# Patient Record
Sex: FEMALE | Race: WHITE | NOT HISPANIC OR LATINO | Employment: UNEMPLOYED | RURAL
[De-identification: names, ages, dates, MRNs, and addresses within clinical notes are randomized per-mention and may not be internally consistent; named-entity substitution may affect disease eponyms.]

---

## 2022-09-20 ENCOUNTER — OFFICE VISIT (OUTPATIENT)
Dept: FAMILY MEDICINE | Facility: CLINIC | Age: 62
End: 2022-09-20
Payer: MEDICARE

## 2022-09-20 VITALS
DIASTOLIC BLOOD PRESSURE: 88 MMHG | WEIGHT: 137.19 LBS | BODY MASS INDEX: 22.05 KG/M2 | HEIGHT: 66 IN | TEMPERATURE: 98 F | OXYGEN SATURATION: 95 % | SYSTOLIC BLOOD PRESSURE: 126 MMHG | RESPIRATION RATE: 22 BRPM | HEART RATE: 117 BPM

## 2022-09-20 DIAGNOSIS — Z11.59 SCREENING FOR VIRAL DISEASE: ICD-10-CM

## 2022-09-20 DIAGNOSIS — Z12.31 SCREENING MAMMOGRAM FOR BREAST CANCER: ICD-10-CM

## 2022-09-20 DIAGNOSIS — F32.A DEPRESSION, UNSPECIFIED DEPRESSION TYPE: ICD-10-CM

## 2022-09-20 DIAGNOSIS — K63.0 ABSCESS OF SIGMOID COLON: ICD-10-CM

## 2022-09-20 DIAGNOSIS — M81.0 OSTEOPOROSIS, UNSPECIFIED OSTEOPOROSIS TYPE, UNSPECIFIED PATHOLOGICAL FRACTURE PRESENCE: ICD-10-CM

## 2022-09-20 DIAGNOSIS — Z12.11 SCREEN FOR COLON CANCER: ICD-10-CM

## 2022-09-20 DIAGNOSIS — Z13.6 ENCOUNTER FOR SCREENING FOR CARDIOVASCULAR DISORDERS: ICD-10-CM

## 2022-09-20 DIAGNOSIS — R73.9 HYPERGLYCEMIA: ICD-10-CM

## 2022-09-20 DIAGNOSIS — Z79.899 OTHER LONG TERM (CURRENT) DRUG THERAPY: ICD-10-CM

## 2022-09-20 DIAGNOSIS — R03.0 ELEVATED BLOOD PRESSURE READING: Primary | ICD-10-CM

## 2022-09-20 DIAGNOSIS — Z98.890 S/P CLOSURE OF ILEOSTOMY: ICD-10-CM

## 2022-09-20 DIAGNOSIS — L98.9 SKIN LESION: ICD-10-CM

## 2022-09-20 LAB
25(OH)D3 SERPL-MCNC: 9.1 NG/ML
ALBUMIN SERPL BCP-MCNC: 3.9 G/DL (ref 3.5–5)
ALBUMIN/GLOB SERPL: 1.3 {RATIO}
ALP SERPL-CCNC: 104 U/L (ref 50–130)
ALT SERPL W P-5'-P-CCNC: 44 U/L (ref 13–56)
ANION GAP SERPL CALCULATED.3IONS-SCNC: 15 MMOL/L (ref 7–16)
AST SERPL W P-5'-P-CCNC: 56 U/L (ref 15–37)
BASOPHILS # BLD AUTO: 0.07 K/UL (ref 0–0.2)
BASOPHILS NFR BLD AUTO: 0.8 % (ref 0–1)
BILIRUB SERPL-MCNC: 1 MG/DL (ref ?–1.2)
BUN SERPL-MCNC: 4 MG/DL (ref 7–18)
BUN/CREAT SERPL: 8 (ref 6–20)
CALCIUM SERPL-MCNC: 8.9 MG/DL (ref 8.5–10.1)
CHLORIDE SERPL-SCNC: 96 MMOL/L (ref 98–107)
CHOLEST SERPL-MCNC: 130 MG/DL (ref 0–200)
CHOLEST/HDLC SERPL: 1.7 {RATIO}
CO2 SERPL-SCNC: 28 MMOL/L (ref 21–32)
CREAT SERPL-MCNC: 0.48 MG/DL (ref 0.55–1.02)
CREAT UR-MCNC: 185 MG/DL (ref 28–219)
DIFFERENTIAL METHOD BLD: ABNORMAL
EGFR (NO RACE VARIABLE) (RUSH/TITUS): 107 ML/MIN/1.73M²
EOSINOPHIL # BLD AUTO: 0.1 K/UL (ref 0–0.5)
EOSINOPHIL NFR BLD AUTO: 1.1 % (ref 1–4)
ERYTHROCYTE [DISTWIDTH] IN BLOOD BY AUTOMATED COUNT: 13.3 % (ref 11.5–14.5)
EST. AVERAGE GLUCOSE BLD GHB EST-MCNC: 74 MG/DL
FOLATE SERPL-MCNC: 2.3 NG/ML (ref 3.1–17.5)
GLOBULIN SER-MCNC: 3 G/DL (ref 2–4)
GLUCOSE SERPL-MCNC: 80 MG/DL (ref 74–106)
HBA1C MFR BLD HPLC: 4.8 % (ref 4.5–6.6)
HCT VFR BLD AUTO: 44.9 % (ref 38–47)
HCV AB SER QL: NORMAL
HDLC SERPL-MCNC: 77 MG/DL (ref 40–60)
HGB BLD-MCNC: 15.8 G/DL (ref 12–16)
HIV 1+O+2 AB SERPL QL: NORMAL
IMM GRANULOCYTES # BLD AUTO: 0.05 K/UL (ref 0–0.04)
IMM GRANULOCYTES NFR BLD: 0.5 % (ref 0–0.4)
LDLC SERPL CALC-MCNC: 42 MG/DL
LDLC/HDLC SERPL: 0.5 {RATIO}
LYMPHOCYTES # BLD AUTO: 1.78 K/UL (ref 1–4.8)
LYMPHOCYTES NFR BLD AUTO: 19.5 % (ref 27–41)
MCH RBC QN AUTO: 36.8 PG (ref 27–31)
MCHC RBC AUTO-ENTMCNC: 35.2 G/DL (ref 32–36)
MCV RBC AUTO: 104.7 FL (ref 80–96)
MICROALBUMIN UR-MCNC: 4.2 MG/DL (ref 0–2.8)
MICROALBUMIN/CREAT RATIO PNL UR: 22.7 MG/G (ref 0–30)
MONOCYTES # BLD AUTO: 0.84 K/UL (ref 0–0.8)
MONOCYTES NFR BLD AUTO: 9.2 % (ref 2–6)
MPC BLD CALC-MCNC: 10 FL (ref 9.4–12.4)
NEUTROPHILS # BLD AUTO: 6.29 K/UL (ref 1.8–7.7)
NEUTROPHILS NFR BLD AUTO: 68.9 % (ref 53–65)
NONHDLC SERPL-MCNC: 53 MG/DL
NRBC # BLD AUTO: 0 X10E3/UL
NRBC, AUTO (.00): 0 %
PLATELET # BLD AUTO: 244 K/UL (ref 150–400)
POTASSIUM SERPL-SCNC: 3.5 MMOL/L (ref 3.5–5.1)
PROT SERPL-MCNC: 6.9 G/DL (ref 6.4–8.2)
RBC # BLD AUTO: 4.29 M/UL (ref 4.2–5.4)
SODIUM SERPL-SCNC: 135 MMOL/L (ref 136–145)
T4 FREE SERPL-MCNC: 0.94 NG/DL (ref 0.76–1.46)
TRIGL SERPL-MCNC: 57 MG/DL (ref 35–150)
TSH SERPL DL<=0.005 MIU/L-ACNC: 0.48 UIU/ML (ref 0.36–3.74)
VIT B12 SERPL-MCNC: >6000 PG/ML (ref 193–986)
VLDLC SERPL-MCNC: 11 MG/DL
WBC # BLD AUTO: 9.13 K/UL (ref 4.5–11)

## 2022-09-20 PROCEDURE — 36415 COLL VENOUS BLD VENIPUNCTURE: CPT | Mod: ,,, | Performed by: NURSE PRACTITIONER

## 2022-09-20 PROCEDURE — 90686 IIV4 VACC NO PRSV 0.5 ML IM: CPT | Mod: ,,, | Performed by: NURSE PRACTITIONER

## 2022-09-20 PROCEDURE — 82607 VITAMIN B-12: CPT | Mod: ,,, | Performed by: CLINICAL MEDICAL LABORATORY

## 2022-09-20 PROCEDURE — 99214 OFFICE O/P EST MOD 30 MIN: CPT | Mod: ,,, | Performed by: NURSE PRACTITIONER

## 2022-09-20 PROCEDURE — 84439 THYROID PANEL: ICD-10-PCS | Mod: ,,, | Performed by: CLINICAL MEDICAL LABORATORY

## 2022-09-20 PROCEDURE — 90670 PCV13 VACCINE IM: CPT | Mod: ,,, | Performed by: NURSE PRACTITIONER

## 2022-09-20 PROCEDURE — 85025 CBC WITH DIFFERENTIAL: ICD-10-PCS | Mod: ,,, | Performed by: CLINICAL MEDICAL LABORATORY

## 2022-09-20 PROCEDURE — 99214 PR OFFICE/OUTPT VISIT, EST, LEVL IV, 30-39 MIN: ICD-10-PCS | Mod: ,,, | Performed by: NURSE PRACTITIONER

## 2022-09-20 PROCEDURE — 80061 LIPID PANEL: ICD-10-PCS | Mod: ,,, | Performed by: CLINICAL MEDICAL LABORATORY

## 2022-09-20 PROCEDURE — 84443 THYROID PANEL: ICD-10-PCS | Mod: ,,, | Performed by: CLINICAL MEDICAL LABORATORY

## 2022-09-20 PROCEDURE — 82607 VITAMIN B12/FOLATE, SERUM PANEL: ICD-10-PCS | Mod: ,,, | Performed by: CLINICAL MEDICAL LABORATORY

## 2022-09-20 PROCEDURE — 82043 MICROALBUMIN / CREATININE RATIO URINE: ICD-10-PCS | Mod: ,,, | Performed by: CLINICAL MEDICAL LABORATORY

## 2022-09-20 PROCEDURE — G0008 ADMIN INFLUENZA VIRUS VAC: HCPCS | Mod: ,,, | Performed by: NURSE PRACTITIONER

## 2022-09-20 PROCEDURE — 82043 UR ALBUMIN QUANTITATIVE: CPT | Mod: ,,, | Performed by: CLINICAL MEDICAL LABORATORY

## 2022-09-20 PROCEDURE — 90686 FLU VACCINE (QUAD) GREATER THAN OR EQUAL TO 3YO PRESERVATIVE FREE IM: ICD-10-PCS | Mod: ,,, | Performed by: NURSE PRACTITIONER

## 2022-09-20 PROCEDURE — 80053 COMPREHENSIVE METABOLIC PANEL: ICD-10-PCS | Mod: ,,, | Performed by: CLINICAL MEDICAL LABORATORY

## 2022-09-20 PROCEDURE — 82746 VITAMIN B12/FOLATE, SERUM PANEL: ICD-10-PCS | Mod: ,,, | Performed by: CLINICAL MEDICAL LABORATORY

## 2022-09-20 PROCEDURE — 80061 LIPID PANEL: CPT | Mod: ,,, | Performed by: CLINICAL MEDICAL LABORATORY

## 2022-09-20 PROCEDURE — 96372 PR INJECTION,THERAP/PROPH/DIAG2ST, IM OR SUBCUT: ICD-10-PCS | Mod: ,,, | Performed by: NURSE PRACTITIONER

## 2022-09-20 PROCEDURE — 84443 ASSAY THYROID STIM HORMONE: CPT | Mod: ,,, | Performed by: CLINICAL MEDICAL LABORATORY

## 2022-09-20 PROCEDURE — 85025 COMPLETE CBC W/AUTO DIFF WBC: CPT | Mod: ,,, | Performed by: CLINICAL MEDICAL LABORATORY

## 2022-09-20 PROCEDURE — 82306 VITAMIN D: ICD-10-PCS | Mod: ,,, | Performed by: CLINICAL MEDICAL LABORATORY

## 2022-09-20 PROCEDURE — 86803 HEPATITIS C ANTIBODY: ICD-10-PCS | Mod: ,,, | Performed by: CLINICAL MEDICAL LABORATORY

## 2022-09-20 PROCEDURE — 90670 PNEUMOCOCCAL CONJUGATE VACCINE 13-VALENT LESS THAN 5YO & GREATER THAN: ICD-10-PCS | Mod: ,,, | Performed by: NURSE PRACTITIONER

## 2022-09-20 PROCEDURE — 84439 ASSAY OF FREE THYROXINE: CPT | Mod: ,,, | Performed by: CLINICAL MEDICAL LABORATORY

## 2022-09-20 PROCEDURE — 82570 MICROALBUMIN / CREATININE RATIO URINE: ICD-10-PCS | Mod: ,,, | Performed by: CLINICAL MEDICAL LABORATORY

## 2022-09-20 PROCEDURE — 86803 HEPATITIS C AB TEST: CPT | Mod: ,,, | Performed by: CLINICAL MEDICAL LABORATORY

## 2022-09-20 PROCEDURE — 83036 HEMOGLOBIN A1C: ICD-10-PCS | Mod: ,,, | Performed by: CLINICAL MEDICAL LABORATORY

## 2022-09-20 PROCEDURE — G0009 ADMIN PNEUMOCOCCAL VACCINE: HCPCS | Mod: ,,, | Performed by: NURSE PRACTITIONER

## 2022-09-20 PROCEDURE — 82306 VITAMIN D 25 HYDROXY: CPT | Mod: ,,, | Performed by: CLINICAL MEDICAL LABORATORY

## 2022-09-20 PROCEDURE — 83036 HEMOGLOBIN GLYCOSYLATED A1C: CPT | Mod: ,,, | Performed by: CLINICAL MEDICAL LABORATORY

## 2022-09-20 PROCEDURE — G0008 FLU VACCINE (QUAD) GREATER THAN OR EQUAL TO 3YO PRESERVATIVE FREE IM: ICD-10-PCS | Mod: ,,, | Performed by: NURSE PRACTITIONER

## 2022-09-20 PROCEDURE — 36415 PR COLLECTION VENOUS BLOOD,VENIPUNCTURE: ICD-10-PCS | Mod: ,,, | Performed by: NURSE PRACTITIONER

## 2022-09-20 PROCEDURE — 96372 THER/PROPH/DIAG INJ SC/IM: CPT | Mod: ,,, | Performed by: NURSE PRACTITIONER

## 2022-09-20 PROCEDURE — 80053 COMPREHEN METABOLIC PANEL: CPT | Mod: ,,, | Performed by: CLINICAL MEDICAL LABORATORY

## 2022-09-20 PROCEDURE — G0009 PNEUMOCOCCAL CONJUGATE VACCINE 13-VALENT LESS THAN 5YO & GREATER THAN: ICD-10-PCS | Mod: ,,, | Performed by: NURSE PRACTITIONER

## 2022-09-20 PROCEDURE — 82746 ASSAY OF FOLIC ACID SERUM: CPT | Mod: ,,, | Performed by: CLINICAL MEDICAL LABORATORY

## 2022-09-20 PROCEDURE — 82570 ASSAY OF URINE CREATININE: CPT | Mod: ,,, | Performed by: CLINICAL MEDICAL LABORATORY

## 2022-09-20 PROCEDURE — 87389 HIV 1 / 2 ANTIBODY: ICD-10-PCS | Mod: ,,, | Performed by: CLINICAL MEDICAL LABORATORY

## 2022-09-20 PROCEDURE — 87389 HIV-1 AG W/HIV-1&-2 AB AG IA: CPT | Mod: ,,, | Performed by: CLINICAL MEDICAL LABORATORY

## 2022-09-20 RX ORDER — ZOSTER VACCINE RECOMBINANT, ADJUVANTED 50 MCG/0.5
0.5 KIT INTRAMUSCULAR ONCE
Qty: 1 EACH | Refills: 0 | Status: SHIPPED | OUTPATIENT
Start: 2022-09-20 | End: 2022-09-20

## 2022-09-20 RX ORDER — ESCITALOPRAM OXALATE 20 MG/1
20 TABLET ORAL DAILY
COMMUNITY
End: 2023-02-03 | Stop reason: SDUPTHER

## 2022-09-20 RX ORDER — FAMOTIDINE 20 MG/1
20 TABLET, FILM COATED ORAL 2 TIMES DAILY
COMMUNITY
End: 2023-02-22

## 2022-09-20 RX ORDER — CYANOCOBALAMIN 1000 UG/ML
1000 INJECTION, SOLUTION INTRAMUSCULAR; SUBCUTANEOUS
Status: COMPLETED | OUTPATIENT
Start: 2022-09-20 | End: 2022-09-20

## 2022-09-20 RX ADMIN — CYANOCOBALAMIN 1000 MCG: 1000 INJECTION, SOLUTION INTRAMUSCULAR; SUBCUTANEOUS at 03:09

## 2022-09-20 NOTE — PROGRESS NOTES
Vanessa Rasmussen DNP   [address] [place] [address]     PATIENT NAME: Tammy Behles  : 1960  DATE: 22  MRN: [id number]      Billing Provider: Vanessa Rasmussen DNP  Level of Service:   Patient PCP Information       Provider PCP Type    Vanessa Rasmussen DNP General            Reason for Visit / Chief Complaint: chest lesion  and Shortness of Breath (Exertion ) needing to establish care.      Update PCP  Update Chief Complaint         History of Present Illness / Problem Focused Workflow     Tammy Behles presents to the clinic with chest lesion  and Shortness of Breath (Exertion )     HPI    Review of Systems     Review of Systems     Medical / Social / Family History     Past Medical History:   Diagnosis Date    Depression     Neuropathy     Osteoporosis        Past Surgical History:   Procedure Laterality Date     SECTION      ILEOSTOMY      LIVER SURGERY         Social History  Ms.  reports that she has been smoking cigarettes. She has a 45.00 pack-year smoking history. She has never used smokeless tobacco. She reports current alcohol use of about 56.0 standard drinks per week. She reports current drug use. Drug: Marijuana.    Family History  Ms.'s family history includes Cancer in her father, mother, and sister; Diverticulitis in her mother; Hypertension in her father; Ulcerative colitis in her brother.    Medications and Allergies     Medications  No outpatient medications have been marked as taking for the 22 encounter (Office Visit) with Vanessa Rasmussen DNP.     Current Facility-Administered Medications for the 22 encounter (Office Visit) with Vanessa Rasmussen DNP   Medication Dose Route Frequency Provider Last Rate Last Admin    [COMPLETED] cyanocobalamin injection 1,000 mcg  1,000 mcg Intramuscular 1 time in Clinic/HOD Vanessa Rasmussen DNP   1,000 mcg at 22 1513       Allergies  Review of patient's allergies indicates:   Allergen Reactions  "   Keflex [cephalexin] Itching and Rash       Physical Examination   /88 (BP Location: Left arm, Patient Position: Sitting, BP Method: Large (Automatic))   Pulse (!) 117   Temp 98.4 °F (36.9 °C) (Oral)   Resp (!) 22   Ht 5' 6" (1.676 m)   Wt 62.2 kg (137 lb 3.2 oz)   SpO2 95%   BMI 22.14 kg/m²    Physical Exam  Vitals and nursing note reviewed.   Constitutional:       Appearance: Normal appearance. She is normal weight.   HENT:      Head: Normocephalic.      Nose: Nose normal.      Mouth/Throat:      Mouth: Mucous membranes are moist.   Eyes:      Pupils: Pupils are equal, round, and reactive to light.   Cardiovascular:      Rate and Rhythm: Normal rate and regular rhythm.      Pulses: Normal pulses.      Heart sounds: Normal heart sounds.   Pulmonary:      Effort: Pulmonary effort is normal.      Breath sounds: Normal breath sounds.   Abdominal:      General: Abdomen is flat. Bowel sounds are normal.      Palpations: Abdomen is soft.      Comments: Scarring rt mid abd from ileostomy reversal.   Musculoskeletal:      Cervical back: Normal range of motion and neck supple.      Comments: Cane and partner to assist with walking and getting around.   Skin:     General: Skin is warm and dry.      Capillary Refill: Capillary refill takes less than 2 seconds.   Neurological:      General: No focal deficit present.      Mental Status: She is alert and oriented to person, place, and time. Mental status is at baseline.   Psychiatric:         Mood and Affect: Mood normal.         Behavior: Behavior normal.         Thought Content: Thought content normal.         Judgment: Judgment normal.        Assessment and Plan (including Health Maintenance)      Problem List  Smart Sets  Document Outside HM   :    Plan:     Here to establish care   Has not seen provider for a few years...    Labs today  Pcv and flu vacc today  Req b12    Mmg - ordered to do walkin - poor transportation issues.   Cologuard - refuses " colonoscopy  Had a colon abscess went to [place] and was transferred to [facility] and had emergency surgery with ileostomy and then had reversal done nearly a year later.. 2020? Get records    Sent in tdap and shingrix wait 4 weeks to get.   Needs pap scheduled.   Smokes 1 ppd x15 years.     Unable to walk without cane and assistance from her partner.      Health Maintenance Due   Topic Date Due    Cervical Cancer Screening  Never done    TETANUS VACCINE  Never done    Mammogram  Never done    Colorectal Cancer Screening  Never done    Shingles Vaccine (1 of 2) Never done       Problem List Items Addressed This Visit          Psychiatric    Depression       Derm    Skin lesion    Relevant Orders    Ambulatory referral/consult to Dermatology       Cardiac/Vascular    Encounter for screening for cardiovascular disorders    Relevant Orders    Lipid Panel (Completed)    Elevated blood pressure reading - Primary    Relevant Orders    CBC Auto Differential (Completed)    Comprehensive Metabolic Panel (Completed)    Microalbumin/Creatinine Ratio, Urine (Completed)       ID    Screening for viral disease    Relevant Orders    Hepatitis C Antibody (Completed)    HIV 1/2 Ag/Ab (4th Gen) (Completed)       Endocrine    Hyperglycemia    Relevant Orders    Hemoglobin A1C (Completed)       GI    Abscess of sigmoid colon    S/P closure of ileostomy       Orthopedic    Osteoporosis       Palliative Care    Other long term (current) drug therapy    Relevant Orders    Vitamin D (Completed)    Vitamin B12 & Folate (Completed)    Thyroid Panel (Completed)       Health Maintenance Topics with due status: Not Due       Topic Last Completion Date    Pneumococcal Vaccines (Age 0-64) 09/20/2022    Lipid Panel 09/20/2022       Future Appointments   Date Time Provider Department Center   9/21/2022  3:30 PM Morena Paige MD [facility] DER[facility] [place]   9/29/2022 10:00 AM Vanessa Rasmussen DNP [facility] [place]            Signature:  Vanessa   MARYURI Rasmussen, DNP      [address] [place] [address]    Date of encounter: 9/20/22

## 2022-09-21 ENCOUNTER — OFFICE VISIT (OUTPATIENT)
Dept: DERMATOLOGY | Facility: CLINIC | Age: 62
End: 2022-09-21
Payer: MEDICARE

## 2022-09-21 VITALS — WEIGHT: 137 LBS | HEIGHT: 66 IN | BODY MASS INDEX: 22.02 KG/M2 | RESPIRATION RATE: 18 BRPM

## 2022-09-21 DIAGNOSIS — D48.5 NEOPLASM OF UNCERTAIN BEHAVIOR OF SKIN: ICD-10-CM

## 2022-09-21 DIAGNOSIS — L57.8 OTHER SKIN CHANGES DUE TO CHRONIC EXPOSURE TO NONIONIZING RADIATION: ICD-10-CM

## 2022-09-21 DIAGNOSIS — Z80.8 FAMILY HISTORY OF MELANOMA: ICD-10-CM

## 2022-09-21 DIAGNOSIS — L82.1 SEBORRHEIC KERATOSES: Primary | ICD-10-CM

## 2022-09-21 PROBLEM — Z79.899 OTHER LONG TERM (CURRENT) DRUG THERAPY: Status: ACTIVE | Noted: 2022-09-21

## 2022-09-21 PROBLEM — Z11.59 SCREENING FOR VIRAL DISEASE: Status: ACTIVE | Noted: 2022-09-21

## 2022-09-21 PROBLEM — Z13.6 ENCOUNTER FOR SCREENING FOR CARDIOVASCULAR DISORDERS: Status: ACTIVE | Noted: 2022-09-21

## 2022-09-21 PROBLEM — Z12.11 SCREEN FOR COLON CANCER: Status: ACTIVE | Noted: 2022-09-21

## 2022-09-21 PROBLEM — Z12.31 SCREENING MAMMOGRAM FOR BREAST CANCER: Status: ACTIVE | Noted: 2022-09-21

## 2022-09-21 PROBLEM — L98.9 SKIN LESION: Status: ACTIVE | Noted: 2022-09-21

## 2022-09-21 PROBLEM — R73.9 HYPERGLYCEMIA: Status: ACTIVE | Noted: 2022-09-21

## 2022-09-21 PROBLEM — R03.0 ELEVATED BLOOD PRESSURE READING: Status: ACTIVE | Noted: 2022-09-21

## 2022-09-21 PROCEDURE — 99203 OFFICE O/P NEW LOW 30 MIN: CPT | Mod: 25,,, | Performed by: DERMATOLOGY

## 2022-09-21 PROCEDURE — 11603 PR EXC SKIN MALIG 2.1-3 CM TRUNK,ARM,LEG: ICD-10-PCS | Mod: 51,,, | Performed by: DERMATOLOGY

## 2022-09-21 PROCEDURE — 99203 PR OFFICE/OUTPT VISIT, NEW, LEVL III, 30-44 MIN: ICD-10-PCS | Mod: 25,,, | Performed by: DERMATOLOGY

## 2022-09-21 PROCEDURE — 12032 INTMD RPR S/A/T/EXT 2.6-7.5: CPT | Mod: ,,, | Performed by: DERMATOLOGY

## 2022-09-21 PROCEDURE — 11603 EXC TR-EXT MAL+MARG 2.1-3 CM: CPT | Mod: 51,,, | Performed by: DERMATOLOGY

## 2022-09-21 PROCEDURE — 12032 PR LAYR CLOS WND TRUNK,ARM,LEG 2.6-7.5 CM: ICD-10-PCS | Mod: ,,, | Performed by: DERMATOLOGY

## 2022-09-21 NOTE — PATIENT INSTRUCTIONS
The ABCDEs of Melanoma  Asymmetry - when one side is unlike the other  Border - irregular  Color - different shades of colors that can be black, brown, tan, white, red, grey, or blue  Diameter - as big as or larger than the size of a pencil eraser (6mm)  Evolving - changing in size, color, or shape or stands out from the rest of your moles  Sunscreen Recommendations  I recommended a broad spectrum sunscreen with a SPF of 30 or higher that is water-resistant. SPF 30 sunscreens block approximately 97 percent of the sun's harmful rays.   Sunscreens should be applied at least 15 minutes prior to expected sun exposure and then every 90 minutes after that as long as sun exposure continues.   If swimming or exercising sunscreen should be reapplied every 45 minutes to an hour after getting wet or sweating.  One ounce, or the equivalent of a shot glass full of sunscreen, is adequate to protect the skin not covered by a bathing suit.   I also recommended a lip balm with a sunscreen as well.   Healthy Sun Protective Behaviors  Sun protective clothing can be used in lieu of sunscreen but must be worn the entire time you are exposed to the sun's rays.  Seek shade between 10 a.m. and 2 p.m.  Use extra caution near water, snow, or sand as they reflect sun rays  Avoid tanning beds and consider sunless self-tanning products instead  Perform monthly self skin exams  Biopsy Site Care  Starting tomorrow you may shower and wash the area with dial antibacterial soap  Pat dry and apply vaseline and a bandaid  Perform this routine for three days  The area will be irritated, sore, slightly red, and may itch, sting, or burn  Do not apply make-up to the area until it is healed  There will be a scar  The area will take 1-2 weeks to heal  No soaking in the tub or hot tub for one week    Remove sutures in 10 days

## 2022-09-21 NOTE — PROGRESS NOTES
[facility]   Morena Paige MD    Patient Name: Tammy Behles  Patient YOB: 1960   Date of Service: 22    CC: Above the Waist Skin Exam    HPI: Tammy Behles is a 62 y.o. female presents today for an above the waist skin exam.  Patient has not been seen by a dermatologist in the past and dermatologic history includes family hx of melanoma (mom). Patient is concerned today about a lesion located on the left chest.  It has been present for 1 year(s). It has not been treated in the past.      Past Medical History:   Diagnosis Date    Depression     Neuropathy     Osteoporosis      Past Surgical History:   Procedure Laterality Date     SECTION      ILEOSTOMY      LIVER SURGERY       Review of patient's allergies indicates:   Allergen Reactions    Keflex [cephalexin] Itching and Rash       Current Outpatient Medications:     ergocalciferol (ERGOCALCIFEROL) 50,000 unit Cap, Take 50,000 Units by mouth every 7 days., Disp: , Rfl:     EScitalopram oxalate (LEXAPRO) 20 MG tablet, Take 20 mg by mouth once daily., Disp: , Rfl:     famotidine (PEPCID) 20 MG tablet, Take 20 mg by mouth 2 (two) times daily., Disp: , Rfl:     folic acid (FOLVITE) 1 MG tablet, Take 1 mg by mouth 2 (two) times daily. Bid x 1 mos then daily, Disp: , Rfl:     ROS: A focused review of systems was obtained and negative.     Exam: A sun exposed skin exam was performed including scalp, hair, face, neck, chest, back, abdomen, right arm, left arm, right hand, left hand, and nails.  All areas examined were normal expect as per below in assessment and plan.  General Appearance of the patient is well developed and well nourished.  Orientation: alert and oriented x 3.  Mood and affect: pleasant.    Assessment:   The primary encounter diagnosis was Seborrheic keratoses. Diagnoses of Other skin changes due to chronic exposure to nonionizing radiation, Neoplasm of uncertain behavior of skin, and Family history of melanoma were also  pertinent to this visit.    Plan:      Seborrheic Keratosis (L82.1)  - Stuck-on, warty, greasy brown papule with pseudo-horn cysts scattered on the trunk and extremities    Plan: Counseling.  I counseled the patient regarding the following:  Skin Care: Seborrheic Keratoses are benign. No treatment is necessary.  Expectations: Seborrheic Keratoses are benign warty growths. Patients get more of them as they age    Plan: Reassurance      Neoplasm of Uncertain Behavior (D48.5)  - fungating mass located on the left chest  Ddx includes: SCC vs Melanoma    Plan: Counseling.  I counseled the patient regarding the following:  Instructions: Neoplasms of Uncertain Behavior can be observed, biopsied or surgically removed depending on the  level of clinical suspicion.  Instructions: Neoplasms of Uncertain Behavior can be observed, biopsied or surgically removed depending on the  level of clinical suspicion.  Contact Office if: patient develops any new lesions that fail to heal, ulcerate or bleed.    Plan:  Excisional Biopsy    Biopsy Photograph Reviewed: Yes    Procedure Note    Location (A): left chest  Preop Size: 2.0 x 3.0  Margin: 0.4 cm  Total Excised Diameter: 2.8 x 2.8  Procedure: Excision - Elliptical  Anesthesia: local infiltration-1% lidocaine with epinephrine (12 cc)  Estimated Blood Loss: minimal  Complications: none    Repair Type: Intermediate  Repair Length: 5 cm    Consent was obtained from the patient. The risks and benefits to therapy were discussed in detail. Specifically, the risks of infection, scarring, bleeding, prolonged wound healing, incomplete removal, allergy to anesthesia, nerve injury and recurrence were addressed. Prior to the procedure, the treatment site was clearly identified and confirmed by the patient. All components of Universal Protocol/PAUSE Rule completed.    Procedure: The patient was placed in a comfortable position exposing the surgical site. The area was prepped with Hibiclens and  draped in the usual fashion. An elliptical excision was performed, on the above listed location The margin was drawn around the clinically apparent lesion. An elliptical shape was then drawn on the skin incorporating the lesion and margins. Incisions were then made along these lines to the appropriate tissue plane and the lesion was extirpated. A 15 blade was used. The lesion was excised to the layer of the adipose tissue, removed and sent to pathology for processing and histologic evaluation.    Intermediate layered repair was performed because closure of the subcutaneous tissue and superficial non-muscular fascia was required, because damaged skin surrounding defect made closure difficult, because extensive undermining required, and because Burow's triangles were required. Undermining was performed with blunt dissection. Hemostasis was achieved with electrocautery. The subcutaneous tissue and dermis were closed with 3-0 Vicryl (interrupted). Epidermal closure was achieved with 4-0 Ethilon(interrupted). Petrolatum + dry sterile dressing were applied. I reviewed with the patient in detail post-care instructions. Patient is not to engage in any heavy lifting, exercise, or swimming for the next 14 days. Should the patient develop any fevers, chills, bleeding, severe pain patient will contact the office immediately.   - Pt will have Vanessa Rasmussen remove her sutures in 10 days       Family history of malignant melanoma  - no suspicious lesions noted    Plan: Counseling  I counseled the patient regarding the following:  Skin Care: Patients with a family history of melanoma should wear broad spectrum sunscreen and sun protective clothing.  Expectations: Patients with a family history of melanoma from a 1st degree relative have a higher risk of developing a melanoma compared with the rest of the population. Monthly self-skin checks should be performed to monitor for any moles that change in size, shape or color, itch burn  or bleed.  Contact Office if: Patient notices any new or changing moles.  Other Skin Changes Due to Chronic Exposure of Nonionizing Radiation (L57.8)    Plan: Monitoring.     Plan: Sunscreen Recommendations.  I recommended a broad spectrum sunscreen with a SPF of 30 or higher. I explained that SPF 30 sunscreens block approximately 97 percent of the  sun's harmful rays. Sunscreens should be applied at least 15 minutes prior to expected sun exposure and then every 2 hours after that as long as  sun exposure continues. If swimming or exercising sunscreen should be reapplied every 45 minutes to an hour after getting wet or sweating. One  ounce, or the equivalent of a shot glass full of sunscreen, is adequate to protect the skin not covered by a bathing suit. I also recommended a lip  balm with a sunscreen as well. Sun protective clothing can be used in lieu of sunscreen but must be worn the entire time you are exposed to the  sun's rays.]      Follow up if symptoms worsen or fail to improve.    Morena Paige MD

## 2022-09-22 RX ORDER — FOLIC ACID 1 MG/1
1 TABLET ORAL 2 TIMES DAILY
COMMUNITY
End: 2022-09-22 | Stop reason: SDUPTHER

## 2022-09-22 RX ORDER — FOLIC ACID 1 MG/1
1 TABLET ORAL DAILY
Qty: 90 TABLET | Refills: 1 | Status: SHIPPED | OUTPATIENT
Start: 2022-09-22 | End: 2023-02-22 | Stop reason: SDUPTHER

## 2022-09-22 RX ORDER — ERGOCALCIFEROL 1.25 MG/1
50000 CAPSULE ORAL
COMMUNITY
End: 2022-09-22 | Stop reason: SDUPTHER

## 2022-09-22 RX ORDER — ERGOCALCIFEROL 1.25 MG/1
50000 CAPSULE ORAL
Qty: 15 CAPSULE | Refills: 1 | Status: SHIPPED | OUTPATIENT
Start: 2022-09-22 | End: 2023-02-22 | Stop reason: SDUPTHER

## 2022-09-29 ENCOUNTER — OFFICE VISIT (OUTPATIENT)
Dept: FAMILY MEDICINE | Facility: CLINIC | Age: 62
End: 2022-09-29
Payer: MEDICARE

## 2022-09-29 VITALS
WEIGHT: 137.81 LBS | HEIGHT: 66 IN | SYSTOLIC BLOOD PRESSURE: 175 MMHG | RESPIRATION RATE: 22 BRPM | OXYGEN SATURATION: 97 % | DIASTOLIC BLOOD PRESSURE: 101 MMHG | HEART RATE: 130 BPM | TEMPERATURE: 98 F | BODY MASS INDEX: 22.15 KG/M2

## 2022-09-29 DIAGNOSIS — Z48.02 ENCOUNTER FOR REMOVAL OF SUTURES: ICD-10-CM

## 2022-09-29 DIAGNOSIS — L98.9 SKIN LESION: ICD-10-CM

## 2022-09-29 DIAGNOSIS — T81.41XA INFECTION FOLLOWING A PROCEDURE, SUPERFICIAL INCISIONAL SURGICAL SITE, INITIAL ENCOUNTER: Primary | ICD-10-CM

## 2022-09-29 PROCEDURE — 87070 CULTURE OTHR SPECIMN AEROBIC: CPT | Mod: ,,, | Performed by: CLINICAL MEDICAL LABORATORY

## 2022-09-29 PROCEDURE — 87186 CULTURE, WOUND: ICD-10-PCS | Mod: ,,, | Performed by: CLINICAL MEDICAL LABORATORY

## 2022-09-29 PROCEDURE — 87070 CULTURE, WOUND: ICD-10-PCS | Mod: ,,, | Performed by: CLINICAL MEDICAL LABORATORY

## 2022-09-29 PROCEDURE — 99213 OFFICE O/P EST LOW 20 MIN: CPT | Mod: ,,, | Performed by: NURSE PRACTITIONER

## 2022-09-29 PROCEDURE — 99213 PR OFFICE/OUTPT VISIT, EST, LEVL III, 20-29 MIN: ICD-10-PCS | Mod: ,,, | Performed by: NURSE PRACTITIONER

## 2022-09-29 PROCEDURE — 96372 THER/PROPH/DIAG INJ SC/IM: CPT | Mod: ,,, | Performed by: NURSE PRACTITIONER

## 2022-09-29 PROCEDURE — 87077 CULTURE, WOUND: ICD-10-PCS | Mod: ,,, | Performed by: CLINICAL MEDICAL LABORATORY

## 2022-09-29 PROCEDURE — 87077 CULTURE AEROBIC IDENTIFY: CPT | Mod: ,,, | Performed by: CLINICAL MEDICAL LABORATORY

## 2022-09-29 PROCEDURE — 96372 PR INJECTION,THERAP/PROPH/DIAG2ST, IM OR SUBCUT: ICD-10-PCS | Mod: ,,, | Performed by: NURSE PRACTITIONER

## 2022-09-29 PROCEDURE — 87186 SC STD MICRODIL/AGAR DIL: CPT | Mod: XU,,, | Performed by: CLINICAL MEDICAL LABORATORY

## 2022-09-29 RX ORDER — CLINDAMYCIN HYDROCHLORIDE 300 MG/1
300 CAPSULE ORAL EVERY 8 HOURS
Qty: 30 CAPSULE | Refills: 0 | Status: SHIPPED | OUTPATIENT
Start: 2022-09-29 | End: 2023-02-22 | Stop reason: ALTCHOICE

## 2022-09-29 RX ORDER — CEFTRIAXONE 1 G/1
1 INJECTION, POWDER, FOR SOLUTION INTRAMUSCULAR; INTRAVENOUS
Status: COMPLETED | OUTPATIENT
Start: 2022-09-29 | End: 2022-09-29

## 2022-09-29 RX ORDER — KETOROLAC TROMETHAMINE 30 MG/ML
60 INJECTION, SOLUTION INTRAMUSCULAR; INTRAVENOUS
Status: COMPLETED | OUTPATIENT
Start: 2022-09-29 | End: 2022-09-29

## 2022-09-29 RX ADMIN — CEFTRIAXONE 1 G: 1 INJECTION, POWDER, FOR SOLUTION INTRAMUSCULAR; INTRAVENOUS at 11:09

## 2022-09-29 RX ADMIN — KETOROLAC TROMETHAMINE 60 MG: 30 INJECTION, SOLUTION INTRAMUSCULAR; INTRAVENOUS at 11:09

## 2022-09-29 NOTE — PROGRESS NOTES
Vanessa Rasmussen DNP   [address] [place] [address]     PATIENT NAME: Tammy Behles  : 1960  DATE: 22  MRN: [id number]      Billing Provider: Vanessa Rasmussen DNP  Level of Service:   Patient PCP Information       Provider PCP Type    Vanessa Rasmussen DNP General            Reason for Visit / Chief Complaint: Suture / Staple Removal       Update PCP  Update Chief Complaint         History of Present Illness / Problem Focused Workflow     Tammy Behles presents to the clinic with Suture / Staple Removal     Suture / Staple Removal      Review of Systems     Review of Systems     Medical / Social / Family History     Past Medical History:   Diagnosis Date    Depression     Neuropathy     Osteoporosis        Past Surgical History:   Procedure Laterality Date     SECTION      ILEOSTOMY      LIVER SURGERY         Social History  Ms.  reports that she has been smoking cigarettes. She has a 45.00 pack-year smoking history. She has never used smokeless tobacco. She reports current alcohol use of about 56.0 standard drinks per week. She reports current drug use. Drug: Marijuana.    Family History  Ms.'s family history includes Cancer in her father, mother, and sister; Diverticulitis in her mother; Hypertension in her father; Ulcerative colitis in her brother.    Medications and Allergies     Medications  No outpatient medications have been marked as taking for the 22 encounter (Office Visit) with Vanessa Rasmussen DNP.     Current Facility-Administered Medications for the 22 encounter (Office Visit) with Vanessa Rasmussen DNP   Medication Dose Route Frequency Provider Last Rate Last Admin    [COMPLETED] cefTRIAXone injection 1 g  1 g Intramuscular 1 time in Clinic/HOD Vanessa Rasmussen DNP   1 g at 22 1126    [COMPLETED] ketorolac injection 60 mg  60 mg Intramuscular 1 time in Clinic/HOD Vanessa Rasmussen DNP   60 mg at 22 1126       Allergies  Review of  "patient's allergies indicates:   Allergen Reactions    Keflex [cephalexin] Itching and Rash       Physical Examination   BP (!) 175/101 (BP Location: Left arm, Patient Position: Sitting, BP Method: Large (Automatic))   Pulse (!) 130   Temp 97.7 °F (36.5 °C) (Oral)   Resp (!) 22   Ht 5' 6" (1.676 m)   Wt 62.5 kg (137 lb 12.8 oz)   SpO2 97%   BMI 22.24 kg/m²    Physical Exam  Vitals and nursing note reviewed.   Constitutional:       Appearance: Normal appearance. She is normal weight.   HENT:      Head: Normocephalic.      Nose: Nose normal.      Mouth/Throat:      Mouth: Mucous membranes are moist.   Eyes:      Extraocular Movements: Extraocular movements intact.      Conjunctiva/sclera: Conjunctivae normal.      Pupils: Pupils are equal, round, and reactive to light.   Cardiovascular:      Rate and Rhythm: Normal rate and regular rhythm.      Pulses: Normal pulses.      Heart sounds: Normal heart sounds.   Pulmonary:      Effort: Pulmonary effort is normal.      Breath sounds: Normal breath sounds.   Abdominal:      General: Abdomen is flat. Bowel sounds are normal.      Palpations: Abdomen is soft.      Comments: Scarring rt mid abd from ileostomy reversal.   Musculoskeletal:      Cervical back: Normal range of motion and neck supple.      Comments: Cane and partner to assist with walking and getting around.   Skin:     General: Skin is warm and dry.      Capillary Refill: Capillary refill takes less than 2 seconds.      Findings: Lesion present.      Comments: Had lesion removed from chest by Dr Paige 1 week ago   -  Today here to remove sutures.   4 sutures removed. Thick amount of purulent drainage noted from left side of incision seeping through the incision line.    Neurological:      General: No focal deficit present.      Mental Status: She is alert and oriented to person, place, and time. Mental status is at baseline.   Psychiatric:         Mood and Affect: Mood normal.         Behavior: Behavior " normal.         Thought Content: Thought content normal.         Judgment: Judgment normal.        Assessment and Plan (including Health Maintenance)      Problem List  Smart Sets  Document Outside HM   :    Plan:     Cleaned with soap and water.  Removed 4 sutures.   Applied 5 steri strips to site.   See picture in media prior to dressing.     Cultured drainage.   Start abx see back 1 week.    Health Maintenance Due   Topic Date Due    Cervical Cancer Screening  Never done    TETANUS VACCINE  Never done    Mammogram  Never done    Colorectal Cancer Screening  Never done    Shingles Vaccine (1 of 2) Never done       Problem List Items Addressed This Visit          Derm    Skin lesion       Other    Infection following a procedure, superficial incisional surgical site, initial encounter - Primary    Relevant Orders    Culture, Wound       Health Maintenance Topics with due status: Not Due       Topic Last Completion Date    Pneumococcal Vaccines (Age 0-64) 09/20/2022    Lipid Panel 09/20/2022       Future Appointments   Date Time Provider Department Center   10/19/2022 10:00 AM [place] NUR[facility]LAUR[facility] [place] [place]            Signature:  Vanessa Rasmussen, YEYO      [address] [place] [address]    Date of encounter: 9/29/22

## 2022-10-01 LAB
MICROORGANISM SPEC CULT: ABNORMAL
MICROORGANISM SPEC CULT: ABNORMAL

## 2022-10-09 DIAGNOSIS — Z71.89 COMPLEX CARE COORDINATION: ICD-10-CM

## 2022-10-13 ENCOUNTER — HOSPITAL ENCOUNTER (OUTPATIENT)
Dept: RADIOLOGY | Facility: HOSPITAL | Age: 62
Discharge: HOME | End: 2022-10-13
Attending: NURSE PRACTITIONER
Payer: MEDICARE

## 2022-10-13 ENCOUNTER — OFFICE VISIT (OUTPATIENT)
Dept: FAMILY MEDICINE | Facility: CLINIC | Age: 62
End: 2022-10-13
Payer: MEDICARE

## 2022-10-13 VITALS
HEART RATE: 110 BPM | DIASTOLIC BLOOD PRESSURE: 86 MMHG | WEIGHT: 140 LBS | BODY MASS INDEX: 22.5 KG/M2 | SYSTOLIC BLOOD PRESSURE: 161 MMHG | OXYGEN SATURATION: 95 % | HEIGHT: 66 IN

## 2022-10-13 DIAGNOSIS — R22.1 MASS OF RIGHT SIDE OF NECK: ICD-10-CM

## 2022-10-13 DIAGNOSIS — Z11.3 SCREEN FOR STD (SEXUALLY TRANSMITTED DISEASE): ICD-10-CM

## 2022-10-13 DIAGNOSIS — Z01.411 ENCNTR FOR GYN EXAM (GENERAL) (ROUTINE) W ABNORMAL FINDINGS: Primary | ICD-10-CM

## 2022-10-13 DIAGNOSIS — Z12.31 OTHER SCREENING MAMMOGRAM: ICD-10-CM

## 2022-10-13 DIAGNOSIS — Z12.4 SCREENING FOR MALIGNANT NEOPLASM OF CERVIX: ICD-10-CM

## 2022-10-13 LAB
BACTERIA VAG QL WET PREP: ABNORMAL /HPF
CLUE CELLS VAG QL WET PREP: ABNORMAL /HPF
RBC #/AREA VAG WET PREP: ABNORMAL /HPF
SQUAMOUS EPITHELIALS WET WOUNT, GENITAL: ABNORMAL /HPF
T VAGINALIS VAG QL WET PREP: ABNORMAL /HPF
WBC CLUMPS WET MOUNT, GENITAL: ABNORMAL /HPF
WBC VAG QL WET PREP: ABNORMAL /HPF
YEAST VAG QL WET PREP: ABNORMAL /HPF

## 2022-10-13 PROCEDURE — 87210 WET PREP, GENITAL: ICD-10-PCS | Mod: ,,, | Performed by: CLINICAL MEDICAL LABORATORY

## 2022-10-13 PROCEDURE — 76536 US EXAM OF HEAD AND NECK: CPT | Mod: TC

## 2022-10-13 PROCEDURE — 76536 US EXAM OF HEAD AND NECK: CPT | Mod: 26,,, | Performed by: RADIOLOGY

## 2022-10-13 PROCEDURE — 87591 N.GONORRHOEAE DNA AMP PROB: CPT | Mod: ,,, | Performed by: CLINICAL MEDICAL LABORATORY

## 2022-10-13 PROCEDURE — 87491 CHLMYD TRACH DNA AMP PROBE: CPT | Mod: ,,, | Performed by: CLINICAL MEDICAL LABORATORY

## 2022-10-13 PROCEDURE — 87491 CHLAMYDIA/GONORRHOEAE(GC), PCR: ICD-10-PCS | Mod: ,,, | Performed by: CLINICAL MEDICAL LABORATORY

## 2022-10-13 PROCEDURE — G0123 SCREEN CERV/VAG THIN LAYER: HCPCS | Mod: GCY | Performed by: NURSE PRACTITIONER

## 2022-10-13 PROCEDURE — 77067 SCR MAMMO BI INCL CAD: CPT | Mod: TC

## 2022-10-13 PROCEDURE — 87591 CHLAMYDIA/GONORRHOEAE(GC), PCR: ICD-10-PCS | Mod: ,,, | Performed by: CLINICAL MEDICAL LABORATORY

## 2022-10-13 PROCEDURE — 76536 US SOFT TISSUE HEAD NECK THYROID: ICD-10-PCS | Mod: 26,,, | Performed by: RADIOLOGY

## 2022-10-13 PROCEDURE — G0101 PR CA SCREEN;PELVIC/BREAST EXAM: ICD-10-PCS | Mod: ,,, | Performed by: NURSE PRACTITIONER

## 2022-10-13 PROCEDURE — G0101 CA SCREEN;PELVIC/BREAST EXAM: HCPCS | Mod: ,,, | Performed by: NURSE PRACTITIONER

## 2022-10-13 PROCEDURE — 87210 SMEAR WET MOUNT SALINE/INK: CPT | Mod: ,,, | Performed by: CLINICAL MEDICAL LABORATORY

## 2022-10-13 NOTE — PROGRESS NOTES
"SUBJECTIVE:   62 y.o. female for annual routine Pap and checkup.    Denies urinary ss   Denies family hx female reproductive cancers.     On cane         Current Outpatient Medications   Medication Sig Dispense Refill    clindamycin (CLEOCIN) 300 MG capsule Take 1 capsule (300 mg total) by mouth every 8 (eight) hours. 30 capsule 0    ergocalciferol (ERGOCALCIFEROL) 50,000 unit Cap Take 1 capsule (50,000 Units total) by mouth every 7 days. 15 capsule 1    EScitalopram oxalate (LEXAPRO) 20 MG tablet Take 20 mg by mouth once daily.      famotidine (PEPCID) 20 MG tablet Take 20 mg by mouth 2 (two) times daily.      folic acid (FOLVITE) 1 MG tablet Take 1 tablet (1 mg total) by mouth once daily. Bid x 1 mos then daily 90 tablet 1     No current facility-administered medications for this visit.     Allergies: Keflex [cephalexin]   No LMP recorded. Patient is postmenopausal.    ROS:  Feeling well. No dyspnea or chest pain on exertion.  No abdominal pain, change in bowel habits, black or bloody stools.  No urinary tract symptoms. GYN ROS: no breast pain or new or enlarging lumps on self exam. Stopped menses . No neurological complaints.    OBJECTIVE:   The patient appears well, alert, oriented x 3, in no distress.  BP (!) 161/86   Pulse 110   Ht 5' 6" (1.676 m)   Wt 63.5 kg (140 lb)   SpO2 95%   BMI 22.60 kg/m²   ENT normal.  Neck supple. No adenopathy or thyromegaly. JAIRON. Lungs are clear, good air entry, no wheezes, rhonchi or rales. S1 and S2 normal, no murmurs, regular rate and rhythm. Abdomen soft without tenderness, guarding, mass or organomegaly. Extremities show no edema, normal peripheral pulses. Neurological is normal, no focal findings.    BREAST EXAM: breasts appear normal, no suspicious masses, no skin or nipple changes or axillary nodes    PELVIC EXAM: normal external genitalia, vulva, vagina, cervix, uterus and adnexa, VULVA: normal appearing vulva with no masses, tenderness or lesions, VAGINA: " normal appearing vagina with normal color and discharge, no lesions, CERVIX: normal appearing cervix without discharge or lesions, UTERUS: uterus is normal size, shape, consistency and nontender, ADNEXA: normal adnexa in size, nontender and no masses, RECTAL: external hemorrhoids non-thrombosed, PAP: Pap smear done today, exam chaperoned by PAUL Ren    ASSESSMENT:   well woman    PLAN:   Mammogram yearly   pap smear  return annually or prn

## 2022-10-14 LAB
CHLAMYDIA BY PCR: NEGATIVE
N. GONORRHOEAE (GC) BY PCR: NEGATIVE

## 2022-10-18 LAB
GH SERPL-MCNC: NORMAL NG/ML
INSULIN SERPL-ACNC: NORMAL U[IU]/ML
LAB AP CLINICAL INFORMATION: NORMAL
LAB AP GYN INTERPRETATION: NEGATIVE
LAB AP PAP DISCLAIMER COMMENTS: NORMAL
RENIN PLAS-CCNC: NORMAL NG/ML/H

## 2022-10-19 ENCOUNTER — OFFICE VISIT (OUTPATIENT)
Dept: FAMILY MEDICINE | Facility: CLINIC | Age: 62
End: 2022-10-19
Payer: MEDICARE

## 2022-10-19 VITALS
HEIGHT: 66 IN | BODY MASS INDEX: 22.5 KG/M2 | DIASTOLIC BLOOD PRESSURE: 88 MMHG | RESPIRATION RATE: 20 BRPM | OXYGEN SATURATION: 98 % | HEART RATE: 97 BPM | SYSTOLIC BLOOD PRESSURE: 148 MMHG | WEIGHT: 140 LBS | TEMPERATURE: 98 F

## 2022-10-19 DIAGNOSIS — Z13.5 SCREENING FOR EYE CONDITION: Primary | ICD-10-CM

## 2022-10-19 DIAGNOSIS — I10 HYPERTENSION, UNSPECIFIED TYPE: ICD-10-CM

## 2022-10-19 DIAGNOSIS — Z91.81 HISTORY OF FALLING: ICD-10-CM

## 2022-10-19 DIAGNOSIS — Z74.09 OTHER REDUCED MOBILITY: ICD-10-CM

## 2022-10-19 DIAGNOSIS — Z00.00 WELCOME TO MEDICARE PREVENTIVE VISIT: ICD-10-CM

## 2022-10-19 DIAGNOSIS — M81.0 OSTEOPOROSIS, UNSPECIFIED OSTEOPOROSIS TYPE, UNSPECIFIED PATHOLOGICAL FRACTURE PRESENCE: ICD-10-CM

## 2022-10-19 DIAGNOSIS — F33.2 SEVERE EPISODE OF RECURRENT MAJOR DEPRESSIVE DISORDER, WITHOUT PSYCHOTIC FEATURES: ICD-10-CM

## 2022-10-19 PROCEDURE — G0402 INITIAL PREVENTIVE EXAM: HCPCS | Mod: ,,, | Performed by: NURSE PRACTITIONER

## 2022-10-19 PROCEDURE — G0402 PR WELCOME MEDICARE PREVENTIVE VISIT NEW ENROLLEE: ICD-10-PCS | Mod: ,,, | Performed by: NURSE PRACTITIONER

## 2022-10-19 RX ORDER — BUPROPION HYDROCHLORIDE 75 MG/1
75 TABLET ORAL DAILY
Qty: 90 TABLET | Refills: 1 | Status: SHIPPED | OUTPATIENT
Start: 2022-10-19 | End: 2023-02-22

## 2022-10-19 RX ORDER — ZOSTER VACCINE RECOMBINANT, ADJUVANTED 50 MCG/0.5
0.5 KIT INTRAMUSCULAR ONCE
Qty: 1 EACH | Refills: 1 | Status: SHIPPED | OUTPATIENT
Start: 2022-10-19 | End: 2022-10-19

## 2022-10-19 RX ORDER — LISINOPRIL 5 MG/1
5 TABLET ORAL DAILY
Qty: 90 TABLET | Refills: 1 | Status: SHIPPED | OUTPATIENT
Start: 2022-10-19 | End: 2023-02-22

## 2022-10-19 RX ORDER — GABAPENTIN 100 MG/1
100 CAPSULE ORAL 3 TIMES DAILY
COMMUNITY
End: 2023-02-22

## 2022-10-19 NOTE — PROGRESS NOTES
[place] [facility]       PATIENT NAME: Tammy Behles   : 1960    AGE: 62 y.o. DATE: 10/19/2022   MRN: [id number]        Reason for Visit / Chief Complaint: Medicare IPPE (WELCOME TO MEDICARE WELLNESS VISIT)        Tammy Behles presents for an Welcome to Medicare Medicare AWV today.     The following components were reviewed and updated:    Medical/Social/Family History:  Past Medical History:   Diagnosis Date    Depression     Neuropathy     Osteoporosis         Family History   Problem Relation Age of Onset    Cancer Mother     Diverticulitis Mother     Hypertension Father     Cancer Father     Cancer Sister     Ulcerative colitis Brother         Social History     Tobacco Use   Smoking Status Every Day    Packs/day: 1.00    Years: 40.00    Pack years: 40.00    Types: Cigarettes   Smokeless Tobacco Never   Tobacco Comments    Quit during years of children      Social History     Substance and Sexual Activity   Alcohol Use Not Currently    Comment: Vodka daily 3-4 drinks a day       Family History   Problem Relation Age of Onset    Cancer Mother     Diverticulitis Mother     Hypertension Father     Cancer Father     Cancer Sister     Ulcerative colitis Brother        Past Surgical History:   Procedure Laterality Date     SECTION      x2    ILEOSTOMY      LIVER SURGERY           Allergies and Current Medications     Review of patient's allergies indicates:   Allergen Reactions    Keflex [cephalexin] Itching and Rash       Current Outpatient Medications:     ergocalciferol (ERGOCALCIFEROL) 50,000 unit Cap, Take 1 capsule (50,000 Units total) by mouth every 7 days., Disp: 15 capsule, Rfl: 1    EScitalopram oxalate (LEXAPRO) 20 MG tablet, Take 20 mg by mouth once daily., Disp: , Rfl:     famotidine (PEPCID) 20 MG tablet, Take 20 mg by mouth 2 (two) times daily., Disp: , Rfl:     folic acid (FOLVITE) 1 MG tablet, Take 1 tablet  (1 mg total) by mouth once daily. Bid x 1 mos then daily, Disp: 90 tablet, Rfl: 1    clindamycin (CLEOCIN) 300 MG capsule, Take 1 capsule (300 mg total) by mouth every 8 (eight) hours., Disp: 30 capsule, Rfl: 0    gabapentin (NEURONTIN) 100 MG capsule, Take 100 mg by mouth 3 (three) times daily., Disp: , Rfl:     Health Risk Assessment   Fall Risk: Yes   Obesity: BMI 22.60     Advance Directive:  Does not have an advanced directive. Verbal education and written education included in today's AVS.    X Patient is interested in learning more about how to make advanced directives.  I provided them paperwork and offered to discuss this with them.   Depression: PHQ9 -10. Pt feels like Lexapro is no longer helping. Will adjust her medications & do follow-up in 3 months. Not having suicidal ideations at this time   HTN: 148/88   T2DM: A1C- 4.8   Tobacco use: Pt reports tobacco use. Currently smoking 1 ppd. Encouraged smoking cessation  STI: Not at risk    Alcohol misuse: Reports alcohol use Cage Score: 2. Encouraged pt to quit drinking. Pt has indeed cut down a good bit since last office visit   Statin Use: Encouraged heart healthy diet & exercise   Mini Co/5      Health Risk Assessment  What is your age?:  (60-65)  Are you male or female?: Female  During the past four weeks, how much have you been bothered by emotional problems such as feeling anxious, depressed, irritable, sad, or downhearted and blue?: Moderately  During the past five weeks, has your physical and/or emotional health limited your social activities with family, friends, neighbors, or groups?: Quite a bit  During the past four weeks, how much bodily pain have you generally had?: Severe pain  During the past four weeks, was someone available to help if you needed and wanted help?: Yes, quite a bit  During the past four weeks, what was the hardest physical activity you could do for at least two minutes?: Very light  Can you get to places out of walking  distance without help?  (For example, can you travel alone on buses or taxis, or drive your own car?): No  Can you go shopping for groceries or clothes without someone's help?: No  Can you prepare your own meals?: No  Can you do your own housework without help?: No  Because of any health problems, do you need the help of another person with your personal care needs such as eating, bathing, dressing, or getting around the house?: Yes  Can you handle your own money without help?: Yes  During the past four weeks, how would you rate your health in general?: Fair  How have things been going for you during the past four weeks?: Good and bad parts about equal  Are you having difficulties driving your car?: Not applicable, I do not use a car  Do you always fasten your seat belt when you are in a car?: Yes, usually  How often in the past four weeks have you been bothered by falling or dizzy when standing up?: Often  How often in the past four weeks have you been bothered by sexual problems?: Never  How often in the past four weeks have you been bothered by trouble eating well?: Never  How often in the past four weeks have you been bothered by teeth or denture problems?: Never  How often in the past four weeks have you been bothered with problems using the telephone?: Never  How often in the past four weeks have you been bothered by tiredness or fatigue?: Often  Have you fallen two or more times in the past year?: Yes  Are you afraid of falling?: Yes  Are you a smoker?: Yes, I might quit  During the past four weeks, how many drinks of wine, beer, or other alcoholic beverages did you have?: 10 or more drinks per week  Do you exercise for about 20 minutes three or more days a week?: No, I usually do not exercise this much  Have you been given any information to help you with hazards in your house that might hurt you?: Yes  Have you been given any information to help you with keeping track of your medications?: Yes  How often do  you have trouble taking medicines the way you've been told to take them?: I always take them as prescribed  How confident are you that you can control and manage most of your health problems?: Somewhat confident  What is your race? (Check all that apply.):     Opioid Risk Assessment:  Opioid risk assessment performed today. Patient is HIGH risk for opoid abuse.     Opioid Risk Score         Value Time User    Opioid Risk Score  11 10/19/2022 10:39 AM Shahnaz Iyer, RN                 Health Maintenance   Last eye exam: N/A   Last CV screen with lipids: 2022   Diabetes screening with fasting glucose or A1c: 2022   Colonoscopy: Has cologuard kit at home. Encouraged pt to complete & send back by next week   Flu Vaccine: 2022   Pneumonia vaccines: 2022   COVID vaccine: Awaiting opportunity to get new booster   Hep B vaccine: Not at risk   DEXA: Hx of osteoporosis. Will get dexascan to see how it has progressed   Last pap/pelvic: 10/13/2022- Negative   Last Mammogram: 10/13/2022-Negative   Last PSA screen: N/A   AAA screening: N/A (once in lifetime for males 65-75 who have smoked > 100 cigarettes in lifetime)  HIV Screein2022- Nonreactive  Hepatitis C Screen: 2022- Nonreactive  Low Dose CT Scan: Eligible. Smoked 1 ppd x approx 40 years.  Have set up LDCT discussion appt    Health Maintenance Topics with due status: Not Due       Topic Last Completion Date    Pneumococcal Vaccines (Age 0-64) 2022    Lipid Panel 2022    Cervical Cancer Screening 10/13/2022    Mammogram 10/13/2022     Health Maintenance Due   Topic Date Due    Colorectal Cancer Screening  Never done       Incontinence  Bowel: No  Bladder: No    Lab results available in Epic or see dates from [facility] above:   Lab Results   Component Value Date    CHOL 130 2022     Lab Results   Component Value Date    HDL 77 (H) 2022     Lab Results   Component Value Date    LDLCALC 42 2022     Lab  "Results   Component Value Date    TRIG 57 09/20/2022     Lab Results   Component Value Date    CHOLHDL 1.7 09/20/2022       Lab Results   Component Value Date    HGBA1C 4.8 09/20/2022       Sodium   Date Value Ref Range Status   09/20/2022 135 (L) 136 - 145 mmol/L Final     Potassium   Date Value Ref Range Status   09/20/2022 3.5 3.5 - 5.1 mmol/L Final     Chloride   Date Value Ref Range Status   09/20/2022 96 (L) 98 - 107 mmol/L Final     CO2   Date Value Ref Range Status   09/20/2022 28 21 - 32 mmol/L Final     Glucose   Date Value Ref Range Status   09/20/2022 80 74 - 106 mg/dL Final     BUN   Date Value Ref Range Status   09/20/2022 4 (L) 7 - 18 mg/dL Final     Creatinine   Date Value Ref Range Status   09/20/2022 0.48 (L) 0.55 - 1.02 mg/dL Final     Calcium   Date Value Ref Range Status   09/20/2022 8.9 8.5 - 10.1 mg/dL Final     Total Protein   Date Value Ref Range Status   09/20/2022 6.9 6.4 - 8.2 g/dL Final     Albumin   Date Value Ref Range Status   09/20/2022 3.9 3.5 - 5.0 g/dL Final     Bilirubin, Total   Date Value Ref Range Status   09/20/2022 1.0 >0.0 - 1.2 mg/dL Final     Alk Phos   Date Value Ref Range Status   09/20/2022 104 50 - 130 U/L Final     AST   Date Value Ref Range Status   09/20/2022 56 (H) 15 - 37 U/L Final     ALT   Date Value Ref Range Status   09/20/2022 44 13 - 56 U/L Final     Anion Gap   Date Value Ref Range Status   09/20/2022 15 7 - 16 mmol/L Final           Care Team   PCP: Vanessa Rasmussen DNP          **See Completed Assessments for Annual Wellness visit within the encounter summary    The following assessments were completed & reviewed:  Depression Screening  Cognitive function Screening  Timed Get Up Test  Whisper Test  Vision Screen  Health Risk Assessment  Checklist of ADLs and IADLs      Objective  Vitals:    10/19/22 1024   BP: (!) 148/88   Pulse: 97   Resp: 20   Temp: 98.2 °F (36.8 °C)   TempSrc: Oral   SpO2: 98%   Weight: 63.5 kg (140 lb)   Height: 5' 6" (1.676 m) "   PainSc:   5   PainLoc: Hip      Body mass index is 22.6 kg/m².  Ideal body weight: 59.3 kg (130 lb 11.7 oz)       Physical Exam  Vitals and nursing note reviewed.   Constitutional:       Appearance: Normal appearance. She is normal weight.   HENT:      Head: Normocephalic.      Nose: Nose normal.      Mouth/Throat:      Mouth: Mucous membranes are moist.   Eyes:      Pupils: Pupils are equal, round, and reactive to light.   Cardiovascular:      Rate and Rhythm: Normal rate and regular rhythm.      Pulses: Normal pulses.      Heart sounds: Normal heart sounds.   Pulmonary:      Effort: Pulmonary effort is normal.      Breath sounds: Normal breath sounds.   Abdominal:      General: Abdomen is flat. Bowel sounds are normal.      Palpations: Abdomen is soft.      Tenderness: There is abdominal tenderness.      Comments: Scarring rt mid abd from ileostomy reversal.   Musculoskeletal:      Cervical back: Normal range of motion and neck supple.      Comments: Cane and partner to assist with walking and getting around.  Generalized weakness to rt side - leans to rt side.   Skin:     General: Skin is warm and dry.      Capillary Refill: Capillary refill takes less than 2 seconds.   Neurological:      General: No focal deficit present.      Mental Status: She is alert and oriented to person, place, and time. Mental status is at baseline.   Psychiatric:         Mood and Affect: Mood normal.         Behavior: Behavior normal.         Thought Content: Thought content normal.         Judgment: Judgment normal.       Assessment:     1. Welcome to Medicare preventive visit    2. Severe episode of recurrent major depressive disorder, without psychotic features    3. Osteoporosis, unspecified osteoporosis type, unspecified pathological fracture presence  - DXA Bone Density Spine And Hip; Future    4. BMI 22.0-22.9, adult    5. Screening for eye condition  - Ambulatory referral/consult to Ophthalmology; Future    6. History of  falling    7. Other reduced mobility    8. Hypertension, unspecified type         Plan:    Referrals/Orders:  Eye exam, Home Health referral     Advised to call office if does not hear from anyone with referral appt within 2-3 weeks to check on status of referral. Voiced understanding.    Keep current on appts & continue medications as ordered.  Prevent falls by wearing good non-skid shoes & using assistive devices as needed. Keep follow-up appt for depression & LDCT discusssion.      Discussed and provided with a screening schedule and personal prevention plan in accordance with USPSTF age appropriate recommendations and Medicare screening guidelines.   Education, counseling, and referrals were provided as needed.  After Visit Summary printed and given to patient which includes written education and a list of any referrals if indicated. All education discussed with patient & handouts given to patient.      F/u plan for yearly AWV.

## 2022-10-19 NOTE — PATIENT INSTRUCTIONS
Counseling and Referral of Other Preventative  (Italic type indicates deductible and co-insurance are waived)    Patient Name: Tammy Behles  Today's Date: 10/19/2022    Health Maintenance       Date Due Completion Date    Colorectal Cancer Screening Never done ---    TETANUS VACCINE 04/19/2023 (Originally 3/3/1978) ---    Shingles Vaccine (1 of 2) 04/19/2023 (Originally 3/3/2010) ---    COVID-19 Vaccine (1) 09/20/2023 (Originally 1960) ---    LDCT Lung Screen 09/20/2030 (Originally 3/3/2010) ---    Pneumococcal Vaccines (Age 0-64) (2 - PPSV23 if available, else PCV20) 09/20/2023 9/20/2022    Mammogram 10/13/2023 10/13/2022    Cervical Cancer Screening 10/13/2025 10/13/2022    Lipid Panel 09/20/2027 9/20/2022        No orders of the defined types were placed in this encounter.    The following information is provided to all patients.  This information is to help you find resources for any of the problems found today that may be affecting your health:                Living healthy guide: www.[facility].[place].gov      Understanding Diabetes: www.diabetes.org      Eating healthy: www.cdc.gov/healthyweight      CDC home safety checklist: www.cdc.gov/steadi/patient.html      Agency on Aging: www.goea.[place].[facility]      Alcoholics anonymous (AA): www.aa.org      Physical Activity: www.mehnaz.nih.gov/yb4hrow      Tobacco use: www.quitwithusla.org

## 2022-11-04 ENCOUNTER — TELEPHONE (OUTPATIENT)
Dept: FAMILY MEDICINE | Facility: CLINIC | Age: 62
End: 2022-11-04
Payer: MEDICARE

## 2022-11-04 DIAGNOSIS — M81.0 OSTEOPOROSIS, UNSPECIFIED OSTEOPOROSIS TYPE, UNSPECIFIED PATHOLOGICAL FRACTURE PRESENCE: ICD-10-CM

## 2022-11-04 DIAGNOSIS — Z98.890 S/P CLOSURE OF ILEOSTOMY: ICD-10-CM

## 2022-11-04 DIAGNOSIS — M62.50 MUSCULAR ATROPHY, UNSPECIFIED SITE: Primary | ICD-10-CM

## 2022-11-04 DIAGNOSIS — Z78.9: ICD-10-CM

## 2022-11-08 ENCOUNTER — HOSPITAL ENCOUNTER (OUTPATIENT)
Dept: RADIOLOGY | Facility: HOSPITAL | Age: 62
Discharge: HOME | End: 2022-11-08
Attending: NURSE PRACTITIONER
Payer: MEDICARE

## 2022-11-08 DIAGNOSIS — M81.0 OSTEOPOROSIS, UNSPECIFIED OSTEOPOROSIS TYPE, UNSPECIFIED PATHOLOGICAL FRACTURE PRESENCE: ICD-10-CM

## 2022-11-08 PROCEDURE — 77080 DEXA BONE DENSITY SPINE HIP: ICD-10-PCS | Mod: 26,,, | Performed by: RADIOLOGY

## 2022-11-08 PROCEDURE — 77080 DXA BONE DENSITY AXIAL: CPT | Mod: 26,,, | Performed by: RADIOLOGY

## 2022-11-08 PROCEDURE — 77080 DXA BONE DENSITY AXIAL: CPT | Mod: TC

## 2022-11-08 RX ORDER — ALENDRONATE SODIUM 70 MG/1
70 TABLET ORAL
Qty: 12 TABLET | Refills: 3 | Status: SHIPPED | OUTPATIENT
Start: 2022-11-08 | End: 2023-09-12 | Stop reason: SDUPTHER

## 2022-11-08 NOTE — TELEPHONE ENCOUNTER
----- Message from Vanessa Rasmussen DNP sent at 11/8/2022 12:43 PM CST -----  Need fosamax 70 weekly. Along with bone supplement ca and vit d MVI for women

## 2022-11-09 ENCOUNTER — TELEPHONE (OUTPATIENT)
Dept: FAMILY MEDICINE | Facility: CLINIC | Age: 62
End: 2022-11-09
Payer: MEDICARE

## 2022-11-09 NOTE — TELEPHONE ENCOUNTER
----- Message from Virginia Livingston sent at 11/9/2022  3:43 PM CST -----  Regarding: RESULTS  PATIENT CALL TO GET LAB RESULTS, CALL PATIENT  [phone]

## 2022-11-30 ENCOUNTER — OFFICE VISIT (OUTPATIENT)
Dept: FAMILY MEDICINE | Facility: CLINIC | Age: 62
End: 2022-11-30
Payer: MEDICARE

## 2022-11-30 VITALS
OXYGEN SATURATION: 100 % | BODY MASS INDEX: 21.24 KG/M2 | DIASTOLIC BLOOD PRESSURE: 69 MMHG | HEIGHT: 66 IN | HEART RATE: 80 BPM | SYSTOLIC BLOOD PRESSURE: 102 MMHG | WEIGHT: 132.19 LBS

## 2022-11-30 DIAGNOSIS — M25.551 RIGHT HIP PAIN: ICD-10-CM

## 2022-11-30 DIAGNOSIS — M54.50 ACUTE BILATERAL LOW BACK PAIN WITHOUT SCIATICA: Primary | ICD-10-CM
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
## 2022-11-30 PROCEDURE — 96372 THER/PROPH/DIAG INJ SC/IM: CPT | Mod: ,,, | Performed by: NURSE PRACTITIONER

## 2022-11-30 PROCEDURE — 99213 OFFICE O/P EST LOW 20 MIN: CPT | Mod: ,,, | Performed by: NURSE PRACTITIONER

## 2022-11-30 PROCEDURE — 99213 PR OFFICE/OUTPT VISIT, EST, LEVL III, 20-29 MIN: ICD-10-PCS | Mod: ,,, | Performed by: NURSE PRACTITIONER

## 2022-11-30 PROCEDURE — 96372 PR INJECTION,THERAP/PROPH/DIAG2ST, IM OR SUBCUT: ICD-10-PCS | Mod: ,,, | Performed by: NURSE PRACTITIONER

## 2022-11-30 RX ORDER — KETOROLAC TROMETHAMINE 30 MG/ML
60 INJECTION, SOLUTION INTRAMUSCULAR; INTRAVENOUS
Status: COMPLETED | OUTPATIENT
Start: 2022-11-30 | End: 2022-11-30

## 2022-11-30 RX ORDER — TIZANIDINE 4 MG/1
4 TABLET ORAL EVERY 8 HOURS
Qty: 20 TABLET | Refills: 0 | Status: SHIPPED | OUTPATIENT
Start: 2022-11-30 | End: 2023-02-22

## 2022-11-30 RX ORDER — DICLOFENAC SODIUM 75 MG/1
75 TABLET, DELAYED RELEASE ORAL 2 TIMES DAILY
Qty: 60 TABLET | Refills: 0 | Status: SHIPPED | OUTPATIENT
Start: 2022-11-30 | End: 2023-02-22

## 2022-11-30 RX ORDER — TRAMADOL HYDROCHLORIDE 50 MG/1
50 TABLET ORAL EVERY 6 HOURS
Qty: 21 TABLET | Refills: 0 | Status: SHIPPED | OUTPATIENT
Start: 2022-11-30 | End: 2023-02-22

## 2022-11-30 RX ADMIN — KETOROLAC TROMETHAMINE 60 MG: 30 INJECTION, SOLUTION INTRAMUSCULAR; INTRAVENOUS at 04:11

## 2022-11-30 NOTE — PROGRESS NOTES
Vanessa Rasmussen DNP   [address] [place] [address]     PATIENT NAME: Tammy Behles  : 1960  DATE: 22  MRN: [id number]      Billing Provider: Vanessa Rasmussen DNP  Level of Service: [place] OFFICE/OUTPT VISIT, EST, LEVL III, 20-29 MIN  Patient PCP Information       Provider PCP Type    Vanessa Rasmussen DNP General            Reason for Visit / Chief Complaint: Back Pain       Update PCP  Update Chief Complaint         History of Present Illness / Problem Focused Workflow     Tammy Behles presents to the clinic with Back Pain     Back Pain    Review of Systems     Review of Systems   Musculoskeletal:  Positive for back pain.      Medical / Social / Family History     Past Medical History:   Diagnosis Date    Depression     Neuropathy     Osteoporosis        Past Surgical History:   Procedure Laterality Date     SECTION      x2    ILEOSTOMY      LIVER SURGERY         Social History  Ms.  reports that she has been smoking cigarettes. She has a 40.00 pack-year smoking history. She has never used smokeless tobacco. She reports that she does not currently use alcohol. She reports current drug use. Drug: Marijuana.    Family History  Ms.'s family history includes Cancer in her father, mother, and sister; Diverticulitis in her mother; Hypertension in her father; Ulcerative colitis in her brother.    Medications and Allergies     Medications  No outpatient medications have been marked as taking for the 22 encounter (Office Visit) with Vanessa Rasmussen DNP.     Current Facility-Administered Medications for the 22 encounter (Office Visit) with Vanessa Rasmussen DNP   Medication Dose Route Frequency Provider Last Rate Last Admin    ketorolac injection 60 mg  60 mg Intramuscular 1 time in Clinic/HOD Vanessa Rasmussen DNP           Allergies  Review of patient's allergies indicates:   Allergen Reactions    Keflex [cephalexin] Itching and Rash       Physical Examination  "  /69   Pulse 80   Ht 5' 6" (1.676 m)   Wt 60 kg (132 lb 3.2 oz)   SpO2 100%   BMI 21.34 kg/m²    Physical Exam  Vitals and nursing note reviewed.   Constitutional:       Appearance: Normal appearance. She is normal weight. She is ill-appearing.   HENT:      Head: Normocephalic.      Nose: Nose normal.      Mouth/Throat:      Mouth: Mucous membranes are moist.   Eyes:      Pupils: Pupils are equal, round, and reactive to light.   Cardiovascular:      Rate and Rhythm: Normal rate and regular rhythm.      Pulses: Normal pulses.      Heart sounds: Normal heart sounds.   Pulmonary:      Effort: Pulmonary effort is normal.      Breath sounds: Normal breath sounds.   Abdominal:      General: Abdomen is flat. Bowel sounds are normal.      Palpations: Abdomen is soft.      Comments: Scarring rt mid abd from ileostomy reversal.   Musculoskeletal:         General: Tenderness and signs of injury present.      Cervical back: Normal range of motion and neck supple.      Comments: Cane and partner to assist with walking and getting around.  Low back pain and more to rt hip./ no bruising noted.   Skin:     General: Skin is warm and dry.      Capillary Refill: Capillary refill takes less than 2 seconds.   Neurological:      General: No focal deficit present.      Mental Status: She is alert and oriented to person, place, and time. Mental status is at baseline.   Psychiatric:         Mood and Affect: Mood normal.         Behavior: Behavior normal.         Thought Content: Thought content normal.         Judgment: Judgment normal.        Assessment and Plan (including Health Maintenance)      Problem List  Smart Sets  Document Outside HM   :    Plan:         Health Maintenance Due   Topic Date Due    Colorectal Cancer Screening  Never done       Problem List Items Addressed This Visit          Orthopedic    Acute bilateral low back pain without sciatica - Primary    Fall    Right hip pain       Health Maintenance Topics " with due status: Not Due       Topic Last Completion Date    Pneumococcal Vaccines (Age 0-64) 09/20/2022    Lipid Panel 09/20/2022    Cervical Cancer Screening 10/13/2022    Mammogram 10/13/2022       Future Appointments   Date Time Provider Department Center   12/19/2022 10:00 AM Vanessa Rasmussen DNP [facility] [place]   10/23/2023  9:00 AM AWV NURSE [facility] [place]            Signature:  Vanessa Rasmussen DNP      [address] [place] [address]    Date of encounter: 11/30/22

## 2023-01-13 ENCOUNTER — DOCUMENT SCAN (OUTPATIENT)
Dept: HOME HEALTH SERVICES | Facility: HOSPITAL | Age: 63
End: 2023-01-13

## 2023-01-23 PROBLEM — Z13.5 SCREENING FOR EYE CONDITION: Status: RESOLVED | Noted: 2022-09-21 | Resolved: 2023-01-23

## 2023-02-03 RX ORDER — ESCITALOPRAM OXALATE 20 MG/1
20 TABLET ORAL DAILY
Qty: 90 TABLET | Refills: 1 | Status: SHIPPED | OUTPATIENT
Start: 2023-02-03 | End: 2023-03-15 | Stop reason: SDUPTHER

## 2023-02-03 NOTE — TELEPHONE ENCOUNTER
----- Message from Claire Rdz sent at 2/3/2023  9:48 AM CST -----  Patient needs a refill on her lexipro sent to [facility] in [place] [phone].

## 2023-02-07 ENCOUNTER — TELEPHONE (OUTPATIENT)
Dept: FAMILY MEDICINE | Facility: CLINIC | Age: 63
End: 2023-02-07
Payer: MEDICARE

## 2023-02-07 NOTE — TELEPHONE ENCOUNTER
----- Message from Claire Rdz sent at 2/7/2023  1:10 PM CST -----  Patient needs refills on her lexapro sent to [facility] in [place] [phone].

## 2023-02-22 ENCOUNTER — OFFICE VISIT (OUTPATIENT)
Dept: FAMILY MEDICINE | Facility: CLINIC | Age: 63
End: 2023-02-22
Payer: MEDICARE

## 2023-02-22 VITALS
SYSTOLIC BLOOD PRESSURE: 123 MMHG | DIASTOLIC BLOOD PRESSURE: 79 MMHG | HEART RATE: 109 BPM | RESPIRATION RATE: 20 BRPM | HEIGHT: 66 IN | TEMPERATURE: 98 F | WEIGHT: 127.63 LBS | BODY MASS INDEX: 20.51 KG/M2 | OXYGEN SATURATION: 96 %

## 2023-02-22 DIAGNOSIS — Z79.899 OTHER LONG TERM (CURRENT) DRUG THERAPY: ICD-10-CM

## 2023-02-22 DIAGNOSIS — F33.2 SEVERE EPISODE OF RECURRENT MAJOR DEPRESSIVE DISORDER, WITHOUT PSYCHOTIC FEATURES: ICD-10-CM

## 2023-02-22 DIAGNOSIS — Z13.6 ENCOUNTER FOR SCREENING FOR CARDIOVASCULAR DISORDERS: ICD-10-CM

## 2023-02-22 DIAGNOSIS — I10 HYPERTENSION, UNSPECIFIED TYPE: Primary | ICD-10-CM

## 2023-02-22 LAB
ALBUMIN SERPL BCP-MCNC: 4 G/DL (ref 3.5–5)
ALBUMIN/GLOB SERPL: 1.5 {RATIO}
ALP SERPL-CCNC: 90 U/L (ref 50–130)
ALT SERPL W P-5'-P-CCNC: 19 U/L (ref 13–56)
ANION GAP SERPL CALCULATED.3IONS-SCNC: 7 MMOL/L (ref 7–16)
AST SERPL W P-5'-P-CCNC: 16 U/L (ref 15–37)
BASOPHILS # BLD AUTO: 0.04 K/UL (ref 0–0.2)
BASOPHILS NFR BLD AUTO: 0.5 % (ref 0–1)
BILIRUB SERPL-MCNC: 0.3 MG/DL (ref ?–1.2)
BUN SERPL-MCNC: 6 MG/DL (ref 7–18)
BUN/CREAT SERPL: 13 (ref 6–20)
CALCIUM SERPL-MCNC: 9.6 MG/DL (ref 8.5–10.1)
CHLORIDE SERPL-SCNC: 99 MMOL/L (ref 98–107)
CHOLEST SERPL-MCNC: 165 MG/DL (ref 0–200)
CHOLEST/HDLC SERPL: 3.7 {RATIO}
CO2 SERPL-SCNC: 30 MMOL/L (ref 21–32)
CREAT SERPL-MCNC: 0.48 MG/DL (ref 0.55–1.02)
DIFFERENTIAL METHOD BLD: ABNORMAL
EGFR (NO RACE VARIABLE) (RUSH/TITUS): 107 ML/MIN/1.73M²
EOSINOPHIL # BLD AUTO: 0.11 K/UL (ref 0–0.5)
EOSINOPHIL NFR BLD AUTO: 1.3 % (ref 1–4)
ERYTHROCYTE [DISTWIDTH] IN BLOOD BY AUTOMATED COUNT: 12.9 % (ref 11.5–14.5)
GLOBULIN SER-MCNC: 2.6 G/DL (ref 2–4)
GLUCOSE SERPL-MCNC: 90 MG/DL (ref 74–106)
HCT VFR BLD AUTO: 45.8 % (ref 38–47)
HDLC SERPL-MCNC: 45 MG/DL (ref 40–60)
HGB BLD-MCNC: 15.2 G/DL (ref 12–16)
IMM GRANULOCYTES # BLD AUTO: 0.01 K/UL (ref 0–0.04)
IMM GRANULOCYTES NFR BLD: 0.1 % (ref 0–0.4)
LDLC SERPL CALC-MCNC: 103 MG/DL
LDLC/HDLC SERPL: 2.3 {RATIO}
LYMPHOCYTES # BLD AUTO: 1.91 K/UL (ref 1–4.8)
LYMPHOCYTES NFR BLD AUTO: 22.3 % (ref 27–41)
MCH RBC QN AUTO: 29.6 PG (ref 27–31)
MCHC RBC AUTO-ENTMCNC: 33.2 G/DL (ref 32–36)
MCV RBC AUTO: 89.3 FL (ref 80–96)
MONOCYTES # BLD AUTO: 0.68 K/UL (ref 0–0.8)
MONOCYTES NFR BLD AUTO: 8 % (ref 2–6)
MPC BLD CALC-MCNC: 11.6 FL (ref 9.4–12.4)
NEUTROPHILS # BLD AUTO: 5.8 K/UL (ref 1.8–7.7)
NEUTROPHILS NFR BLD AUTO: 67.8 % (ref 53–65)
NONHDLC SERPL-MCNC: 120 MG/DL
NRBC # BLD AUTO: 0 X10E3/UL
NRBC, AUTO (.00): 0 %
PLATELET # BLD AUTO: 261 K/UL (ref 150–400)
POTASSIUM SERPL-SCNC: 4.2 MMOL/L (ref 3.5–5.1)
PROT SERPL-MCNC: 6.6 G/DL (ref 6.4–8.2)
RBC # BLD AUTO: 5.13 M/UL (ref 4.2–5.4)
SODIUM SERPL-SCNC: 132 MMOL/L (ref 136–145)
TRIGL SERPL-MCNC: 86 MG/DL (ref 35–150)
VLDLC SERPL-MCNC: 17 MG/DL
WBC # BLD AUTO: 8.55 K/UL (ref 4.5–11)

## 2023-02-22 PROCEDURE — 90677 PNEUMOCOCCAL CONJUGATE VACCINE 20-VALENT: ICD-10-PCS | Mod: ,,, | Performed by: NURSE PRACTITIONER

## 2023-02-22 PROCEDURE — G0009 PNEUMOCOCCAL CONJUGATE VACCINE 20-VALENT: ICD-10-PCS | Mod: ,,, | Performed by: NURSE PRACTITIONER

## 2023-02-22 PROCEDURE — 90677 PCV20 VACCINE IM: CPT | Mod: ,,, | Performed by: NURSE PRACTITIONER

## 2023-02-22 PROCEDURE — G0009 ADMIN PNEUMOCOCCAL VACCINE: HCPCS | Mod: ,,, | Performed by: NURSE PRACTITIONER

## 2023-02-22 PROCEDURE — 80053 COMPREHEN METABOLIC PANEL: CPT | Mod: ,,, | Performed by: CLINICAL MEDICAL LABORATORY

## 2023-02-22 PROCEDURE — 80053 COMPREHENSIVE METABOLIC PANEL: ICD-10-PCS | Mod: ,,, | Performed by: CLINICAL MEDICAL LABORATORY

## 2023-02-22 PROCEDURE — 99214 OFFICE O/P EST MOD 30 MIN: CPT | Mod: ,,, | Performed by: NURSE PRACTITIONER

## 2023-02-22 PROCEDURE — 85025 COMPLETE CBC W/AUTO DIFF WBC: CPT | Mod: ,,, | Performed by: CLINICAL MEDICAL LABORATORY

## 2023-02-22 PROCEDURE — 80061 LIPID PANEL: ICD-10-PCS | Mod: GZ,,, | Performed by: CLINICAL MEDICAL LABORATORY

## 2023-02-22 PROCEDURE — 80061 LIPID PANEL: CPT | Mod: GZ,,, | Performed by: CLINICAL MEDICAL LABORATORY

## 2023-02-22 PROCEDURE — 85025 CBC WITH DIFFERENTIAL: ICD-10-PCS | Mod: ,,, | Performed by: CLINICAL MEDICAL LABORATORY

## 2023-02-22 PROCEDURE — 99214 PR OFFICE/OUTPT VISIT, EST, LEVL IV, 30-39 MIN: ICD-10-PCS | Mod: ,,, | Performed by: NURSE PRACTITIONER

## 2023-02-22 RX ORDER — ERGOCALCIFEROL 1.25 MG/1
50000 CAPSULE ORAL
Qty: 15 CAPSULE | Refills: 1 | Status: SHIPPED | OUTPATIENT
Start: 2023-02-22

## 2023-02-22 RX ORDER — FOLIC ACID 1 MG/1
1 TABLET ORAL DAILY
Qty: 90 TABLET | Refills: 1 | Status: SHIPPED | OUTPATIENT
Start: 2023-02-22 | End: 2023-09-12

## 2023-02-22 RX ORDER — BUPROPION HYDROCHLORIDE 150 MG/1
150 TABLET ORAL DAILY
Qty: 90 TABLET | Refills: 3 | Status: SHIPPED | OUTPATIENT
Start: 2023-02-22 | End: 2024-02-22

## 2023-02-22 NOTE — PROGRESS NOTES
Vanessa Rasmussen DNP   [address] [place] [address]     PATIENT NAME: Tammy Behles  : 1960  DATE: 23  MRN: [id number]      Billing Provider: Vanessa Rasmussen DNP  Level of Service:   Patient PCP Information       Provider PCP Type    Vanessa Rasmussen DNP General            Reason for Visit / Chief Complaint: follow up appt       Update PCP  Update Chief Complaint         History of Present Illness / Problem Focused Workflow     Tammy Behles presents to the clinic with No chief complaint on file.     HPI    Review of Systems     Review of Systems     Medical / Social / Family History     Past Medical History:   Diagnosis Date    Depression     Neuropathy     Osteoporosis        Past Surgical History:   Procedure Laterality Date     SECTION      x2    ILEOSTOMY      LIVER SURGERY         Social History  Ms.  reports that she has been smoking cigarettes. She has a 40.00 pack-year smoking history. She has never used smokeless tobacco. She reports that she does not currently use alcohol. She reports current drug use. Drug: Marijuana.    Family History  Ms.'s family history includes Cancer in her father, mother, and sister; Diverticulitis in her mother; Hypertension in her father; Ulcerative colitis in her brother.    Medications and Allergies     Medications  No outpatient medications have been marked as taking for the 23 encounter (Appointment) with Vanessa Rasmussen DNP.       Allergies  Review of patient's allergies indicates:   Allergen Reactions    Keflex [cephalexin] Itching and Rash       Physical Examination   There were no vitals taken for this visit.   Physical Exam  Vitals and nursing note reviewed.   Constitutional:       Appearance: Normal appearance. She is normal weight.   HENT:      Head: Normocephalic.      Nose: Nose normal.      Mouth/Throat:      Mouth: Mucous membranes are moist.   Eyes:      Pupils: Pupils are equal, round, and reactive to  light.   Cardiovascular:      Rate and Rhythm: Normal rate and regular rhythm.      Pulses: Normal pulses.      Heart sounds: Normal heart sounds.   Pulmonary:      Effort: Pulmonary effort is normal.      Breath sounds: Normal breath sounds.   Abdominal:      General: Abdomen is flat. Bowel sounds are normal.      Palpations: Abdomen is soft.      Comments: Scarring rt mid abd from ileostomy reversal.   Musculoskeletal:      Cervical back: Normal range of motion and neck supple.      Comments: Cane and partner to assist with walking and getting around.   Skin:     General: Skin is warm and dry.      Capillary Refill: Capillary refill takes less than 2 seconds.   Neurological:      General: No focal deficit present.      Mental Status: She is alert and oriented to person, place, and time. Mental status is at baseline.   Psychiatric:         Mood and Affect: Mood normal.         Behavior: Behavior normal.         Thought Content: Thought content normal.         Judgment: Judgment normal.        Assessment and Plan (including Health Maintenance)      Problem List  Smart [employer]  Document Outside HM   :    Plan:        Labs today  Pcv and flu vacc today       Mmg - 10/2022  Cologuard - refuses colonoscopy will do cologard sent to house but couldn't get back to us that week. Will call to reorder a new kit   Had a colon abscess went to [facility] and was transferred to [facility] and had emergency surgery with ileostomy and then had reversal done nearly a year later.. 2020? Get records     Sent in [employer] wait 4 weeks to get.   Pap - 10/2022  Smokes 1 ppd x15 years.      Unable to walk without cane and assistance  Pcv20 today 2/2023  Looks better - no alcohol since October 2022   Lost 10 lbs since October     Health Maintenance Due   Topic Date Due    Colorectal Cancer Screening  Never done    Pneumococcal Vaccines (Age 0-64) (2 - PPSV23 if available, else PCV20) 11/15/2022       Problem List Items Addressed This Visit     None      Health Maintenance Topics with due status: Not Due       Topic Last Completion Date    Lipid Panel 09/20/2022    Cervical Cancer Screening 10/13/2022    Mammogram 10/13/2022       Future Appointments   Date Time Provider Department Center   10/23/2023  9:00 AM [place] [facility] [place]            Signature:  Vanessa Rasmussen, DNP      [address] [place] [address]    Date of encounter: 2/22/23

## 2023-02-23 ENCOUNTER — TELEPHONE (OUTPATIENT)
Dept: FAMILY MEDICINE | Facility: CLINIC | Age: 63
End: 2023-02-23
Payer: MEDICARE

## 2023-02-23 NOTE — TELEPHONE ENCOUNTER
Called [facility] stated they have them placed on file they will get ready for pt    Attempted to call back no answer not able to leave message vm not setup

## 2023-02-23 NOTE — TELEPHONE ENCOUNTER
----- Message from Zekeiah Ormond sent at 2/23/2023 11:22 AM CST -----  Patient stated that the pharmacy said he medication is not down there from yesterday and asked if it could be resent in.

## 2023-02-27 RX ORDER — AMITRIPTYLINE HYDROCHLORIDE 25 MG/1
25 TABLET, FILM COATED ORAL NIGHTLY PRN
Qty: 90 TABLET | Refills: 1 | Status: SHIPPED | OUTPATIENT
Start: 2023-02-27 | End: 2023-09-12

## 2023-02-27 NOTE — TELEPHONE ENCOUNTER
----- Message from Zekeiah Ormond sent at 2/27/2023  9:49 AM CST -----  Patient asked for a call back. Her call back number is [phone].

## 2023-02-27 NOTE — TELEPHONE ENCOUNTER
Pt stated at appt last week y'all talked about changing gabapentin to something else for the neuropathy    She stated she did not get anything

## 2023-03-15 RX ORDER — ESCITALOPRAM OXALATE 20 MG/1
20 TABLET ORAL DAILY
Qty: 90 TABLET | Refills: 0 | Status: SHIPPED | OUTPATIENT
Start: 2023-03-15 | End: 2023-09-12 | Stop reason: SDUPTHER

## 2023-03-15 NOTE — TELEPHONE ENCOUNTER
----- Message from Zekeiah Ormond sent at 3/15/2023  2:40 PM CDT -----  Patient asked for a refill on EScitalopram oxalate (LEXAPRO) 20 MG tablet. She stated that she missed placed the other one.

## 2023-03-15 NOTE — TELEPHONE ENCOUNTER
----- Message from Zekeiah Ormond sent at 3/15/2023  1:08 PM CDT -----  Patient asked for a call back about her medication.

## 2023-03-17 ENCOUNTER — PATIENT MESSAGE (OUTPATIENT)
Dept: RESEARCH | Facility: HOSPITAL | Age: 63
End: 2023-03-17

## 2023-05-02 ENCOUNTER — OFFICE VISIT (OUTPATIENT)
Dept: FAMILY MEDICINE | Facility: CLINIC | Age: 63
End: 2023-05-02
Payer: MEDICARE

## 2023-05-02 VITALS
DIASTOLIC BLOOD PRESSURE: 85 MMHG | TEMPERATURE: 98 F | HEART RATE: 90 BPM | SYSTOLIC BLOOD PRESSURE: 149 MMHG | BODY MASS INDEX: 20.89 KG/M2 | HEIGHT: 66 IN | WEIGHT: 130 LBS

## 2023-05-02 DIAGNOSIS — F17.210 NICOTINE DEPENDENCE, CIGARETTES, UNCOMPLICATED: ICD-10-CM

## 2023-05-02 DIAGNOSIS — Z72.0 TOBACCO USE: ICD-10-CM

## 2023-05-02 DIAGNOSIS — R42 DIZZY SPELLS: ICD-10-CM

## 2023-05-02 DIAGNOSIS — R20.0 ANESTHESIA OF SKIN: ICD-10-CM

## 2023-05-02 DIAGNOSIS — R29.6 FREQUENT FALLS: Primary | ICD-10-CM

## 2023-05-02 DIAGNOSIS — R03.0 ELEVATED BLOOD PRESSURE READING: ICD-10-CM

## 2023-05-02 DIAGNOSIS — R41.3 OTHER AMNESIA: ICD-10-CM

## 2023-05-02 DIAGNOSIS — R51.9 INTRACTABLE HEADACHE, UNSPECIFIED CHRONICITY PATTERN, UNSPECIFIED HEADACHE TYPE: ICD-10-CM

## 2023-05-02 DIAGNOSIS — Z91.81 HISTORY OF FALLING: ICD-10-CM

## 2023-05-02 PROCEDURE — 99214 PR OFFICE/OUTPT VISIT, EST, LEVL IV, 30-39 MIN: ICD-10-PCS | Mod: ,,, | Performed by: NURSE PRACTITIONER

## 2023-05-02 PROCEDURE — 99214 OFFICE O/P EST MOD 30 MIN: CPT | Mod: ,,, | Performed by: NURSE PRACTITIONER

## 2023-05-02 RX ORDER — LOSARTAN POTASSIUM 25 MG/1
25 TABLET ORAL DAILY
Qty: 90 TABLET | Refills: 3 | Status: SHIPPED | OUTPATIENT
Start: 2023-05-02 | End: 2024-05-01

## 2023-05-02 NOTE — PROGRESS NOTES
"   Vanessa Rasmussen DNP   [address] [place] [address]     PATIENT NAME: Tammy Behles  : 1960  DATE: 23  MRN: [id number]      Billing Provider: Vanessa Rasmussen DNP  Level of Service:   Patient PCP Information       Provider PCP Type    Vanessa Rasmussen DNP General            Reason for Visit / Chief Complaint: Extremity Weakness, Tingling, and balance issues       Update PCP  Update Chief Complaint         History of Present Illness / Problem Focused Workflow     Tammy Behles presents to the clinic with Extremity Weakness, Tingling, and balance issues     Extremity Weakness       Review of Systems     Review of Systems   Musculoskeletal:  Positive for extremity weakness.      Medical / Social / Family History     Past Medical History:   Diagnosis Date    Depression     Neuropathy     Osteoporosis        Past Surgical History:   Procedure Laterality Date     SECTION      x2    ILEOSTOMY      LIVER SURGERY         Social History  Ms.  reports that she has been smoking cigarettes. She has a 40.00 pack-year smoking history. She has never used smokeless tobacco. She reports that she does not currently use alcohol. She reports current drug use. Drug: Marijuana.    Family History  Ms.'s family history includes Cancer in her father, mother, and sister; Diverticulitis in her mother; Hypertension in her father; Ulcerative colitis in her brother.    Medications and Allergies     Medications  No outpatient medications have been marked as taking for the 23 encounter (Office Visit) with Vanessa Rasmussen DNP.       Allergies  Review of patient's allergies indicates:   Allergen Reactions    Keflex [cephalexin] Itching and Rash       Physical Examination   BP (!) 149/85   Pulse 90   Temp 98.2 °F (36.8 °C)   Ht 5' 6" (1.676 m)   Wt 59 kg (130 lb)   BMI 20.98 kg/m²    Physical Exam  Vitals and nursing note reviewed.   Constitutional:       Appearance: Normal appearance. She is normal " weight.   HENT:      Head: Normocephalic.      Nose: Nose normal.      Mouth/Throat:      Mouth: Mucous membranes are moist.   Eyes:      Pupils: Pupils are equal, round, and reactive to light.   Cardiovascular:      Rate and Rhythm: Normal rate and regular rhythm.      Pulses: Normal pulses.      Heart sounds: Normal heart sounds.   Pulmonary:      Effort: Pulmonary effort is normal.      Breath sounds: Normal breath sounds.   Abdominal:      General: Abdomen is flat. Bowel sounds are normal.      Palpations: Abdomen is soft.      Comments: Scarring rt mid abd from ileostomy reversal.   Musculoskeletal:      Cervical back: Normal range of motion and neck supple.      Comments: Cane and partner to assist with walking and getting around.   Skin:     General: Skin is warm and dry.      Capillary Refill: Capillary refill takes less than 2 seconds.   Neurological:      Mental Status: She is alert and oriented to person, place, and time. Mental status is at baseline.      Motor: Weakness present.      Coordination: Coordination abnormal.      Gait: Gait abnormal.      Comments: Using cane - walks with legs spread apart.   Psychiatric:         Mood and Affect: Mood normal.         Behavior: Behavior normal.         Thought Content: Thought content normal.         Judgment: Judgment normal.        Assessment and Plan (including Health Maintenance)      Problem List  Smart Sets  Document Outside HM   :    Plan:   Falling frequently, headache,   Bp elevated compared to last visits.   Uses cane  Dizzyness and blurry vision lately  Smokes 1 ppd since 17yo . + more     Bring CG kit and get vaccines utd  Send for ct     Health Maintenance Due   Topic Date Due    TETANUS VACCINE  Never done    Colorectal Cancer Screening  Never done    Shingles Vaccine (1 of 2) Never done       Problem List Items Addressed This Visit          Neuro    Intractable headache    Other amnesia       Cardiac/Vascular    Elevated blood pressure reading     Relevant Orders    CT Head Without Contrast       Endocrine    BMI 22.0-22.9, adult       Palliative Care    History of falling    Relevant Orders    CT Head Without Contrast       Other    Frequent falls - Primary    Nicotine dependence, cigarettes, uncomplicated    Relevant Orders    CT Chest Lung Screening Low Dose    Dizzy spells    Relevant Orders    CT Head Without Contrast    Anesthesia of skin    Tobacco use    Relevant Orders    CT Chest Lung Screening Low Dose       Health Maintenance Topics with due status: Not Due       Topic Last Completion Date    Cervical Cancer Screening 10/13/2022    Mammogram 10/13/2022    Lipid Panel 02/22/2023       Future Appointments   Date Time Provider Department Center   10/23/2023  9:00 AM AWV NURSE, [facility] [place]            Signature:  Vanessa Rasmussen, YEYO      [address] [place] [address]    Date of encounter: 5/2/23

## 2023-05-09 DIAGNOSIS — Z71.89 COMPLEX CARE COORDINATION: ICD-10-CM

## 2023-05-15 LAB — NONINV COLON CA DNA+OCC BLD SCRN STL QL: NEGATIVE

## 2023-05-19 ENCOUNTER — HOSPITAL ENCOUNTER (OUTPATIENT)
Dept: RADIOLOGY | Facility: HOSPITAL | Age: 63
Discharge: HOME | End: 2023-05-19
Attending: NURSE PRACTITIONER
Payer: MEDICARE

## 2023-05-19 DIAGNOSIS — F17.210 NICOTINE DEPENDENCE, CIGARETTES, UNCOMPLICATED: ICD-10-CM

## 2023-05-19 DIAGNOSIS — Z72.0 TOBACCO USE: ICD-10-CM

## 2023-05-19 DIAGNOSIS — Z91.81 HISTORY OF FALLING: ICD-10-CM

## 2023-05-19 DIAGNOSIS — R42 DIZZY SPELLS: ICD-10-CM

## 2023-05-19 DIAGNOSIS — R03.0 ELEVATED BLOOD PRESSURE READING: ICD-10-CM

## 2023-05-19 PROCEDURE — 70450 CT HEAD/BRAIN W/O DYE: CPT | Mod: 26,,, | Performed by: RADIOLOGY

## 2023-05-19 PROCEDURE — 70450 CT HEAD/BRAIN W/O DYE: CPT | Mod: TC

## 2023-05-19 PROCEDURE — 71271 CT CHEST LUNG SCREENING LOW DOSE: ICD-10-PCS | Mod: 26,,, | Performed by: RADIOLOGY

## 2023-05-19 PROCEDURE — 71271 CT THORAX LUNG CANCER SCR C-: CPT | Mod: TC

## 2023-05-19 PROCEDURE — 71271 CT THORAX LUNG CANCER SCR C-: CPT | Mod: 26,,, | Performed by: RADIOLOGY

## 2023-05-19 PROCEDURE — 70450 CT HEAD WITHOUT CONTRAST: ICD-10-PCS | Mod: 26,,, | Performed by: RADIOLOGY

## 2023-05-22 ENCOUNTER — TELEPHONE (OUTPATIENT)
Dept: FAMILY MEDICINE | Facility: CLINIC | Age: 63
End: 2023-05-22

## 2023-05-22 DIAGNOSIS — I25.84 CORONARY ARTERY CALCIFICATION: Primary | ICD-10-CM

## 2023-05-22 DIAGNOSIS — R42 DIZZY SPELLS: ICD-10-CM

## 2023-05-22 DIAGNOSIS — I25.10 CORONARY ARTERY CALCIFICATION: Primary | ICD-10-CM

## 2023-05-22 DIAGNOSIS — R29.6 FREQUENT FALLS: ICD-10-CM

## 2023-05-22 DIAGNOSIS — M62.50 MUSCULAR ATROPHY, UNSPECIFIED SITE: ICD-10-CM

## 2023-05-22 NOTE — TELEPHONE ENCOUNTER
----- Message from Bebo Hernandez RN sent at 5/22/2023  9:03 AM CDT -----  Regarding: Patient call Back  Patient called requesting to speak with you. [phone]

## 2023-05-22 NOTE — TELEPHONE ENCOUNTER
Call and notified pt of scans  and recommendations    She is willing to go to cardiology and was wondering about going to neurology with all the fall issues since CT Head was normal     I pended both if you are okay with both

## 2023-05-22 NOTE — TELEPHONE ENCOUNTER
----- Message from Vanessa Rasmussen DNP sent at 5/20/2023  9:18 PM CDT -----  Had moderate coronary artery calcification she really needs to see cardiology let me know what she says May need stress test and cards appt referral

## 2023-05-29 PROBLEM — Z13.6 ENCOUNTER FOR SCREENING FOR CARDIOVASCULAR DISORDERS: Status: RESOLVED | Noted: 2022-09-21 | Resolved: 2023-05-29

## 2023-06-01 ENCOUNTER — TELEPHONE (OUTPATIENT)
Dept: FAMILY MEDICINE | Facility: CLINIC | Age: 63
End: 2023-06-01

## 2023-06-01 NOTE — TELEPHONE ENCOUNTER
Pt called back and reminded her of our conversation     She remembered after we talked   She said that is another issue she is having with her memory

## 2023-06-01 NOTE — TELEPHONE ENCOUNTER
Pt was called on 05/22 with results see note    Pt came for visit on 05/02 for workup on dizziness and felt like she might have MS     Pt was referred to cardiology due to CT results and pt wanted neurology referral due to normal CT and still having neurological symptoms

## 2023-06-01 NOTE — TELEPHONE ENCOUNTER
----- Message from Nessa Pineda sent at 6/1/2023 10:26 AM CDT -----  Regarding: WANTS A NURSE TO CALL HER ABOUT CT OF THE HEAD RESULTS  Patient stated that she was seen on 05/02/23 and a CT of the Head was ordered on her. She stated that she had the CT done on 05/19/23 but wasn't inform why she needed the CT, and she also wants to know the result of the CT. She wants a nurse to call her at [phone].

## 2023-06-06 ENCOUNTER — TELEPHONE (OUTPATIENT)
Dept: FAMILY MEDICINE | Facility: CLINIC | Age: 63
End: 2023-06-06

## 2023-06-06 NOTE — TELEPHONE ENCOUNTER
----- Message from Jocelynn Rivero sent at 6/6/2023  3:41 PM CDT -----  Regarding: call  Call pt [phone]

## 2023-06-06 NOTE — TELEPHONE ENCOUNTER
Pt stated she just wanted you to know she got her appt with neuro tomorrow at 8 with Dr Sylvester and cards 07/11 @10 with Dr Nam

## 2023-06-07 ENCOUNTER — OFFICE VISIT (OUTPATIENT)
Dept: NEUROLOGY | Facility: CLINIC | Age: 63
End: 2023-06-07
Payer: MEDICARE

## 2023-06-07 VITALS
WEIGHT: 130 LBS | HEART RATE: 93 BPM | DIASTOLIC BLOOD PRESSURE: 78 MMHG | HEIGHT: 66 IN | SYSTOLIC BLOOD PRESSURE: 112 MMHG | BODY MASS INDEX: 20.89 KG/M2 | RESPIRATION RATE: 18 BRPM | OXYGEN SATURATION: 95 %

## 2023-06-07 DIAGNOSIS — M62.50 MUSCULAR ATROPHY, UNSPECIFIED SITE: ICD-10-CM

## 2023-06-07 DIAGNOSIS — M54.50 ACUTE BILATERAL LOW BACK PAIN WITHOUT SCIATICA: ICD-10-CM

## 2023-06-07 DIAGNOSIS — R29.6 FREQUENT FALLS: ICD-10-CM

## 2023-06-07 DIAGNOSIS — G62.9 NEUROPATHY: Primary | ICD-10-CM

## 2023-06-07 DIAGNOSIS — G60.3 IDIOPATHIC PROGRESSIVE NEUROPATHY: ICD-10-CM

## 2023-06-07 DIAGNOSIS — R42 DIZZY SPELLS: ICD-10-CM

## 2023-06-07 PROCEDURE — 99204 PR OFFICE/OUTPT VISIT, NEW, LEVL IV, 45-59 MIN: ICD-10-PCS | Mod: S$PBB,,, | Performed by: PSYCHIATRY & NEUROLOGY

## 2023-06-07 PROCEDURE — 99215 OFFICE O/P EST HI 40 MIN: CPT | Mod: PBBFAC | Performed by: PSYCHIATRY & NEUROLOGY

## 2023-06-07 PROCEDURE — 99204 OFFICE O/P NEW MOD 45 MIN: CPT | Mod: S$PBB,,, | Performed by: PSYCHIATRY & NEUROLOGY

## 2023-06-07 RX ORDER — PREGABALIN 75 MG/1
75 CAPSULE ORAL 2 TIMES DAILY
Qty: 180 CAPSULE | Refills: 3 | Status: SHIPPED | OUTPATIENT
Start: 2023-06-07

## 2023-06-07 RX ORDER — MECLIZINE HYDROCHLORIDE 25 MG/1
25 TABLET ORAL 3 TIMES DAILY PRN
Qty: 270 TABLET | Refills: 3 | Status: SHIPPED | OUTPATIENT
Start: 2023-06-07

## 2023-06-07 NOTE — PROGRESS NOTES
Subjective:       Patient ID: Tammy Behles is a 63 y.o. female     Chief Complaint:    Chief Complaint   Patient presents with    Dizziness        Allergies:  Keflex [cephalexin]    Current Medications:    Outpatient Encounter Medications as of 6/7/2023   Medication Sig Dispense Refill    alendronate (FOSAMAX) 70 MG tablet Take 1 tablet (70 mg total) by mouth every 7 days. 12 tablet 3    amitriptyline (ELAVIL) 25 MG tablet Take 1 tablet (25 mg total) by mouth nightly as needed for Insomnia. 90 tablet 1    buPROPion (WELLBUTRIN XL) 150 MG TB24 tablet Take 1 tablet (150 mg total) by mouth once daily. 90 tablet 3    ergocalciferol (ERGOCALCIFEROL) 50,000 unit Cap Take 1 capsule (50,000 Units total) by mouth every 7 days. 15 capsule 1    EScitalopram oxalate (LEXAPRO) 20 MG tablet Take 1 tablet (20 mg total) by mouth once daily. 90 tablet 0    folic acid (FOLVITE) 1 MG tablet Take 1 tablet (1 mg total) by mouth once daily. Bid x 1 mos then daily 90 tablet 1    losartan (COZAAR) 25 MG tablet Take 1 tablet (25 mg total) by mouth once daily. 90 tablet 3    meclizine (ANTIVERT) 25 mg tablet Take 1 tablet (25 mg total) by mouth 3 (three) times daily as needed for Dizziness. 270 tablet 3    pregabalin (LYRICA) 75 MG capsule Take 1 capsule (75 mg total) by mouth 2 (two) times daily. 180 capsule 3     No facility-administered encounter medications on file as of 6/7/2023.       History of Present Illness  62 yo WF w/ few yrs hx of dizziness/vertigo began after GI surgery and abscess - since then has had gradually intermittent spells of dizziness and vertigo - recently in last few months increased to almost daily - causing some difficulty w/ ambulating and also w/ signif symptoms paresthesias c/w periph neuropathy- Jerrica did not help her symptoms in past but could only tolerate qhs   Pt admits to severe ETOH abuse up to until last year and had very high excess Vit B12 = > 6000  Vertigo has no assoc w/ head movements thus  "meclizine may work well  Pt just started CBD for pain but admits to using marijuana for decades thus unsure if CBD would work for her?       Past Medical History:   Diagnosis Date    Depression     Neuropathy     Osteoporosis        Past Surgical History:   Procedure Laterality Date     SECTION      x2    ILEOSTOMY      LIVER SURGERY         Social History  Ms. Behles  reports that she has been smoking cigarettes. She has a 40.00 pack-year smoking history. She has never used smokeless tobacco. She reports that she does not currently use alcohol. She reports current drug use. Drug: Marijuana.    Family History  Ms.'s Behles family history includes Cancer in her father, mother, and sister; Diverticulitis in her mother; Hypertension in her father; Ulcerative colitis in her brother.    Review of Systems  Review of Systems   Neurological:  Positive for dizziness, tingling and sensory change.   Psychiatric/Behavioral:  Positive for depression. The patient is nervous/anxious.    All other systems reviewed and are negative.   Objective:   /78 (BP Location: Left arm, Patient Position: Sitting, BP Method: Large (Manual))   Pulse 93   Resp 18   Ht 5' 6" (1.676 m)   Wt 59 kg (130 lb)   SpO2 95%   BMI 20.98 kg/m²    NEUROLOGICAL EXAMINATION:     MENTAL STATUS   Oriented to person, place, and time.   Level of consciousness: alert  Knowledge: good.     CRANIAL NERVES   Cranial nerves II through XII intact.     MOTOR EXAM     Strength   Strength 5/5 throughout.     SENSORY EXAM        Paresthesias in both feet/hands      GAIT AND COORDINATION        Uses walker for ambulation       Physical Exam  Vitals reviewed.   Constitutional:       Appearance: She is normal weight.   Neurological:      Mental Status: She is alert and oriented to person, place, and time. Mental status is at baseline.      Cranial Nerves: Cranial nerves 2-12 are intact.      Motor: Motor strength is normal.        Assessment:     Frequent " falls  -     Ambulatory referral/consult to Neurology  -     Ambulatory referral/consult to Physical/Occupational Therapy; Future; Expected date: 06/14/2023    Dizzy spells  -     Ambulatory referral/consult to Neurology    Muscular atrophy, unspecified site  -     Ambulatory referral/consult to Neurology    Acute bilateral low back pain without sciatica    Other orders  -     pregabalin (LYRICA) 75 MG capsule; Take 1 capsule (75 mg total) by mouth 2 (two) times daily.  Dispense: 180 capsule; Refill: 3  -     meclizine (ANTIVERT) 25 mg tablet; Take 1 tablet (25 mg total) by mouth 3 (three) times daily as needed for Dizziness.  Dispense: 270 tablet; Refill: 3         Primary Diagnosis and ICD10  Frequent falls [R29.6]    Plan:     Patient Instructions   Needs PT/Rehab for strength LE's  Trial Lyrica 75 mg twice dialy and meclizine as needed   Cont alcohol cessation   Vit B12 level today   NCV not offered in this clinic at this time  F/u 3 months    There are no discontinued medications.    Requested Prescriptions     Signed Prescriptions Disp Refills    pregabalin (LYRICA) 75 MG capsule 180 capsule 3     Sig: Take 1 capsule (75 mg total) by mouth 2 (two) times daily.    meclizine (ANTIVERT) 25 mg tablet 270 tablet 3     Sig: Take 1 tablet (25 mg total) by mouth 3 (three) times daily as needed for Dizziness.

## 2023-06-14 ENCOUNTER — TELEPHONE (OUTPATIENT)
Dept: FAMILY MEDICINE | Facility: CLINIC | Age: 63
End: 2023-06-14

## 2023-06-14 VITALS — DIASTOLIC BLOOD PRESSURE: 78 MMHG | SYSTOLIC BLOOD PRESSURE: 112 MMHG

## 2023-07-11 ENCOUNTER — OFFICE VISIT (OUTPATIENT)
Dept: CARDIOLOGY | Facility: CLINIC | Age: 63
End: 2023-07-11
Payer: MEDICARE

## 2023-07-11 VITALS
OXYGEN SATURATION: 92 % | SYSTOLIC BLOOD PRESSURE: 130 MMHG | HEIGHT: 66 IN | HEART RATE: 76 BPM | BODY MASS INDEX: 21.05 KG/M2 | WEIGHT: 131 LBS | DIASTOLIC BLOOD PRESSURE: 90 MMHG

## 2023-07-11 DIAGNOSIS — I25.10 CORONARY ARTERY CALCIFICATION: ICD-10-CM

## 2023-07-11 DIAGNOSIS — I10 HYPERTENSION, UNSPECIFIED TYPE: Primary | ICD-10-CM

## 2023-07-11 DIAGNOSIS — I25.84 CORONARY ARTERY CALCIFICATION: ICD-10-CM

## 2023-07-11 DIAGNOSIS — R07.9 CHEST PAIN, UNSPECIFIED TYPE: ICD-10-CM

## 2023-07-11 PROCEDURE — 93010 EKG 12-LEAD: ICD-10-PCS | Mod: S$PBB,,, | Performed by: STUDENT IN AN ORGANIZED HEALTH CARE EDUCATION/TRAINING PROGRAM

## 2023-07-11 PROCEDURE — 99204 PR OFFICE/OUTPT VISIT, NEW, LEVL IV, 45-59 MIN: ICD-10-PCS | Mod: S$PBB,,, | Performed by: STUDENT IN AN ORGANIZED HEALTH CARE EDUCATION/TRAINING PROGRAM

## 2023-07-11 PROCEDURE — 99215 OFFICE O/P EST HI 40 MIN: CPT | Mod: PBBFAC | Performed by: STUDENT IN AN ORGANIZED HEALTH CARE EDUCATION/TRAINING PROGRAM

## 2023-07-11 PROCEDURE — 93005 ELECTROCARDIOGRAM TRACING: CPT | Mod: PBBFAC | Performed by: STUDENT IN AN ORGANIZED HEALTH CARE EDUCATION/TRAINING PROGRAM

## 2023-07-11 PROCEDURE — 99204 OFFICE O/P NEW MOD 45 MIN: CPT | Mod: S$PBB,,, | Performed by: STUDENT IN AN ORGANIZED HEALTH CARE EDUCATION/TRAINING PROGRAM

## 2023-07-11 PROCEDURE — 93010 ELECTROCARDIOGRAM REPORT: CPT | Mod: S$PBB,,, | Performed by: STUDENT IN AN ORGANIZED HEALTH CARE EDUCATION/TRAINING PROGRAM

## 2023-07-11 NOTE — PROGRESS NOTES
PCP: Vanessa Rasmussen DNP    Referring Provider: Vanessa Rasmussen DNP    Subjective:   Tammy Behles is a 63 y.o. female with hx of hypertension who presents for a new patient visit.    Ms. Behles is referred to Cardiology as low-dose CT scan of her chest revealed moderate LM LAD calcification.  She denies chest pain/pressure or dyspnea, however, is not active at baseline due to back pain and neuropathy.  Denies palpitations, lightheadedness or syncope.     Fhx: No known FH of CAD   Shx: Active smoker, 40-pack year hx     EKG 07/11/2023: Normal sinus rhythm, possible left atrial enlargement    ECHO No results found for this or any previous visit.     CATH: No results found for this or any previous visit.     Lab Results   Component Value Date     (L) 02/22/2023    K 4.2 02/22/2023    CL 99 02/22/2023    CO2 30 02/22/2023    BUN 6 (L) 02/22/2023    CREATININE 0.48 (L) 02/22/2023    CALCIUM 9.6 02/22/2023    ANIONGAP 7 02/22/2023       Lab Results   Component Value Date    CHOL 165 02/22/2023    CHOL 130 09/20/2022     Lab Results   Component Value Date    HDL 45 02/22/2023    HDL 77 (H) 09/20/2022     Lab Results   Component Value Date    LDLCALC 103 02/22/2023    LDLCALC 42 09/20/2022     Lab Results   Component Value Date    TRIG 86 02/22/2023    TRIG 57 09/20/2022     Lab Results   Component Value Date    CHOLHDL 3.7 02/22/2023    CHOLHDL 1.7 09/20/2022       Lab Results   Component Value Date    WBC 8.55 02/22/2023    HGB 15.2 02/22/2023    HCT 45.8 02/22/2023    MCV 89.3 02/22/2023     02/22/2023           Current Outpatient Medications:     alendronate (FOSAMAX) 70 MG tablet, Take 1 tablet (70 mg total) by mouth every 7 days., Disp: 12 tablet, Rfl: 3    amitriptyline (ELAVIL) 25 MG tablet, Take 1 tablet (25 mg total) by mouth nightly as needed for Insomnia., Disp: 90 tablet, Rfl: 1    buPROPion (WELLBUTRIN XL) 150 MG TB24 tablet, Take 1 tablet (150 mg total) by mouth once daily., Disp: 90  "tablet, Rfl: 3    EScitalopram oxalate (LEXAPRO) 20 MG tablet, Take 1 tablet (20 mg total) by mouth once daily., Disp: 90 tablet, Rfl: 0    losartan (COZAAR) 25 MG tablet, Take 1 tablet (25 mg total) by mouth once daily., Disp: 90 tablet, Rfl: 3    meclizine (ANTIVERT) 25 mg tablet, Take 1 tablet (25 mg total) by mouth 3 (three) times daily as needed for Dizziness., Disp: 270 tablet, Rfl: 3    pregabalin (LYRICA) 75 MG capsule, Take 1 capsule (75 mg total) by mouth 2 (two) times daily., Disp: 180 capsule, Rfl: 3    ergocalciferol (ERGOCALCIFEROL) 50,000 unit Cap, Take 1 capsule (50,000 Units total) by mouth every 7 days. (Patient not taking: Reported on 7/11/2023), Disp: 15 capsule, Rfl: 1    folic acid (FOLVITE) 1 MG tablet, Take 1 tablet (1 mg total) by mouth once daily. Bid x 1 mos then daily (Patient not taking: Reported on 7/11/2023), Disp: 90 tablet, Rfl: 1    Review of Systems   Constitutional:  Negative for chills, diaphoresis, fever and malaise/fatigue.   Respiratory:  Negative for cough and shortness of breath.    Cardiovascular:  Negative for chest pain, palpitations, orthopnea, claudication, leg swelling and PND.   Gastrointestinal:  Negative for abdominal pain, heartburn, nausea and vomiting.   Neurological:  Negative for dizziness.     Objective:   BP (!) 130/90 (BP Location: Left arm, Patient Position: Sitting)   Pulse 76   Ht 5' 6" (1.676 m)   Wt 59.4 kg (131 lb)   SpO2 (!) 92%   BMI 21.14 kg/m²     Physical Exam  Constitutional:       General: She is not in acute distress.     Appearance: Normal appearance.   Cardiovascular:      Rate and Rhythm: Normal rate and regular rhythm.      Pulses: Normal pulses.      Heart sounds: Normal heart sounds. No murmur heard.    No friction rub. No gallop.   Pulmonary:      Effort: Pulmonary effort is normal.      Breath sounds: Normal breath sounds. No wheezing or rales.   Musculoskeletal:      Right lower leg: No edema.      Left lower leg: No edema. "   Skin:     General: Skin is warm and dry.   Neurological:      Mental Status: She is alert.         Assessment:     1. Hypertension, unspecified type  EKG 12-lead    EKG 12-lead      2. Coronary artery calcification  Ambulatory referral/consult to Cardiology    NM Myocardial Perfusion Spect Multi Pharmacologic    Nuclear Stress Test      3. Chest pain, unspecified type  NM Myocardial Perfusion Spect Multi Pharmacologic    Nuclear Stress Test            Plan:   No problem-specific Assessment & Plan notes found for this encounter.    Moderate LAD and left main calcification on CT scan  No anginal symptoms, however, patient is not active at baseline to assess for exertional symptoms   Schedule pharmacologic stress test with nuclear imaging   Risk factors for CAD include hypertension and active heavy smoking      Hypertension  Blood pressure controlled on current regimen  Continue losartan 25 mg daily    Follow up in 3 months

## 2023-07-17 ENCOUNTER — OFFICE VISIT (OUTPATIENT)
Dept: FAMILY MEDICINE | Facility: CLINIC | Age: 63
End: 2023-07-17
Payer: MEDICARE

## 2023-07-17 VITALS
BODY MASS INDEX: 21.53 KG/M2 | HEART RATE: 103 BPM | OXYGEN SATURATION: 99 % | RESPIRATION RATE: 20 BRPM | TEMPERATURE: 98 F | WEIGHT: 134 LBS | HEIGHT: 66 IN | DIASTOLIC BLOOD PRESSURE: 74 MMHG | SYSTOLIC BLOOD PRESSURE: 120 MMHG

## 2023-07-17 DIAGNOSIS — J43.8 OTHER EMPHYSEMA: ICD-10-CM

## 2023-07-17 DIAGNOSIS — R19.7 DIARRHEA, UNSPECIFIED TYPE: ICD-10-CM

## 2023-07-17 DIAGNOSIS — R10.9 ABDOMINAL PAIN, UNSPECIFIED ABDOMINAL LOCATION: ICD-10-CM

## 2023-07-17 DIAGNOSIS — A08.4 VIRAL GASTROENTERITIS: Primary | ICD-10-CM

## 2023-07-17 DIAGNOSIS — I70.0 AORTIC ATHEROSCLEROSIS: ICD-10-CM

## 2023-07-17 PROCEDURE — 99212 PR OFFICE/OUTPT VISIT, EST, LEVL II, 10-19 MIN: ICD-10-PCS | Mod: ,,, | Performed by: NURSE PRACTITIONER

## 2023-07-17 PROCEDURE — 99212 OFFICE O/P EST SF 10 MIN: CPT | Mod: ,,, | Performed by: NURSE PRACTITIONER

## 2023-07-17 RX ORDER — DICYCLOMINE HYDROCHLORIDE 10 MG/5ML
10 SOLUTION ORAL
Qty: 120 ML | Refills: 0 | Status: SHIPPED | OUTPATIENT
Start: 2023-07-17 | End: 2023-09-12

## 2023-07-17 NOTE — PROGRESS NOTES
Vanessa Rasmussen DNP   [address] [place] [address]     PATIENT NAME: Tammy Behles  : 1960  DATE: 23  MRN: [id number]      Billing Provider: Vanessa Rasmussen DNP  Level of Service:   Patient PCP Information       Provider PCP Type    Vanessa Rasmussen DNP General            Reason for Visit / Chief Complaint: Diarrhea, Abdominal Pain, and possible ate some bad walsh        Update PCP  Update Chief Complaint         History of Present Illness / Problem Focused Workflow     Tammy Behles presents to the clinic with Diarrhea, Abdominal Pain, and possible ate some bad walsh      Diarrhea   Associated symptoms include abdominal pain.   Abdominal Pain  Associated symptoms include diarrhea.     Review of Systems     Review of Systems   Gastrointestinal:  Positive for abdominal pain and diarrhea.      Medical / Social / Family History     Past Medical History:   Diagnosis Date    Depression     Neuropathy     Osteoporosis        Past Surgical History:   Procedure Laterality Date     SECTION      x2    ILEOSTOMY      LIVER SURGERY         Social History  Ms.  reports that she has been smoking cigarettes. She has a 40.00 pack-year smoking history. She has never used smokeless tobacco. She reports that she does not currently use alcohol. She reports current drug use. Drug: Marijuana.    Family History  Ms.'s family history includes Cancer in her father, mother, and sister; Diverticulitis in her mother; Hypertension in her father; Ulcerative colitis in her brother.    Medications and Allergies     Medications  Outpatient Medications Marked as Taking for the 23 encounter (Office Visit) with Vanessa Rasmussen DNP   Medication Sig Dispense Refill    alendronate (FOSAMAX) 70 MG tablet Take 1 tablet (70 mg total) by mouth every 7 days. 12 tablet 3    amitriptyline (ELAVIL) 25 MG tablet Take 1 tablet (25 mg total) by mouth nightly as needed for Insomnia. 90 tablet 1    buPROPion  "(WELLBUTRIN XL) 150 MG TB24 tablet Take 1 tablet (150 mg total) by mouth once daily. 90 tablet 3    ergocalciferol (ERGOCALCIFEROL) 50,000 unit Cap Take 1 capsule (50,000 Units total) by mouth every 7 days. 15 capsule 1    EScitalopram oxalate (LEXAPRO) 20 MG tablet Take 1 tablet (20 mg total) by mouth once daily. 90 tablet 0    losartan (COZAAR) 25 MG tablet Take 1 tablet (25 mg total) by mouth once daily. 90 tablet 3    meclizine (ANTIVERT) 25 mg tablet Take 1 tablet (25 mg total) by mouth 3 (three) times daily as needed for Dizziness. 270 tablet 3    pregabalin (LYRICA) 75 MG capsule Take 1 capsule (75 mg total) by mouth 2 (two) times daily. 180 capsule 3       Allergies  Review of patient's allergies indicates:   Allergen Reactions    Keflex [cephalexin] Itching and Rash       Physical Examination   /74 (BP Location: Left arm, Patient Position: Sitting, BP Method: Large (Automatic))   Pulse 103   Temp 98.4 °F (36.9 °C) (Oral)   Resp 20   Ht 5' 6" (1.676 m)   Wt 60.8 kg (134 lb)   SpO2 99%   BMI 21.63 kg/m²    Physical Exam  Vitals and nursing note reviewed.   Constitutional:       Appearance: She is ill-appearing.   HENT:      Head: Normocephalic.      Nose: Nose normal.      Mouth/Throat:      Mouth: Mucous membranes are moist.   Eyes:      Extraocular Movements: Extraocular movements intact.      Conjunctiva/sclera: Conjunctivae normal.      Pupils: Pupils are equal, round, and reactive to light.   Cardiovascular:      Rate and Rhythm: Normal rate and regular rhythm.      Pulses: Normal pulses.      Heart sounds: Normal heart sounds.   Pulmonary:      Effort: Pulmonary effort is normal.      Breath sounds: Normal breath sounds.   Abdominal:      General: Bowel sounds are normal. There is no distension.      Palpations: Abdomen is soft. There is no mass.      Tenderness: There is abdominal tenderness.      Hernia: No hernia is present.   Musculoskeletal:      Cervical back: Normal range of " motion.   Skin:     General: Skin is warm and dry.      Capillary Refill: Capillary refill takes less than 2 seconds.   Neurological:      General: No focal deficit present.      Mental Status: She is alert and oriented to person, place, and time. Mental status is at baseline.   Psychiatric:         Mood and Affect: Mood normal.         Behavior: Behavior normal.         Thought Content: Thought content normal.         Judgment: Judgment normal.        Assessment and Plan (including Health Maintenance)      Problem List  Smart Sets  Document Outside HM   :    Plan:         Health Maintenance Due   Topic Date Due    TETANUS VACCINE  Never done    Shingles Vaccine (1 of 2) Never done       Problem List Items Addressed This Visit          Pulmonary    Other emphysema       Cardiac/Vascular    Aortic atherosclerosis       ID    Viral gastroenteritis - Primary       Endocrine    BMI 22.0-22.9, adult       GI    Diarrhea    Abdominal pain       Health Maintenance Topics with due status: Not Due       Topic Last Completion Date    Influenza Vaccine 09/20/2022    Cervical Cancer Screening 10/13/2022    Mammogram 10/13/2022    Lipid Panel 02/22/2023    Colorectal Cancer Screening 05/07/2023    LDCT Lung Screen 05/19/2023       Future Appointments   Date Time Provider Department Center   7/31/2023  8:00 AM RUS [facility] NM3 HEART [facility] NM [facility]   7/31/2023  9:00 AM RUSH [facility] STRESS [facility] CDIAG [facility]   7/31/2023 10:00 AM RUS [facility] NM3 HEART [facility]   9/7/2023  1:00 PM KAPIL [facility] NEURO [facility]   10/23/2023  9:00 AM AWV NURSE, [facility] [place]            Signature:  Vanessa Rasmussen, [facility]      [address] [place] [address]    Date of encounter: 7/17/23

## 2023-09-12 ENCOUNTER — OFFICE VISIT (OUTPATIENT)
Dept: FAMILY MEDICINE | Facility: CLINIC | Age: 63
End: 2023-09-12
Payer: MEDICARE

## 2023-09-12 VITALS
WEIGHT: 144 LBS | SYSTOLIC BLOOD PRESSURE: 107 MMHG | TEMPERATURE: 98 F | BODY MASS INDEX: 23.14 KG/M2 | OXYGEN SATURATION: 97 % | HEIGHT: 66 IN | HEART RATE: 102 BPM | DIASTOLIC BLOOD PRESSURE: 52 MMHG | RESPIRATION RATE: 18 BRPM

## 2023-09-12 DIAGNOSIS — Z72.0 TOBACCO USE: ICD-10-CM

## 2023-09-12 DIAGNOSIS — R29.6 FREQUENT FALLS: ICD-10-CM

## 2023-09-12 DIAGNOSIS — M77.50 TENDONITIS OF ANKLE OR FOOT: Primary | ICD-10-CM

## 2023-09-12 DIAGNOSIS — M21.371 FOOT DROP, BILATERAL: ICD-10-CM

## 2023-09-12 DIAGNOSIS — M21.372 FOOT DROP, BILATERAL: ICD-10-CM

## 2023-09-12 PROCEDURE — 1159F PR MEDICATION LIST DOCUMENTED IN MEDICAL RECORD: ICD-10-PCS | Mod: ,,, | Performed by: NURSE PRACTITIONER

## 2023-09-12 PROCEDURE — 99213 OFFICE O/P EST LOW 20 MIN: CPT | Mod: 25,,, | Performed by: NURSE PRACTITIONER

## 2023-09-12 PROCEDURE — 3078F DIAST BP <80 MM HG: CPT | Mod: ,,, | Performed by: NURSE PRACTITIONER

## 2023-09-12 PROCEDURE — 3074F PR MOST RECENT SYSTOLIC BLOOD PRESSURE < 130 MM HG: ICD-10-PCS | Mod: ,,, | Performed by: NURSE PRACTITIONER

## 2023-09-12 PROCEDURE — 4010F PR ACE/ARB THEARPY RXD/TAKEN: ICD-10-PCS | Mod: ,,, | Performed by: NURSE PRACTITIONER

## 2023-09-12 PROCEDURE — 99213 PR OFFICE/OUTPT VISIT, EST, LEVL III, 20-29 MIN: ICD-10-PCS | Mod: 25,,, | Performed by: NURSE PRACTITIONER

## 2023-09-12 PROCEDURE — 3078F PR MOST RECENT DIASTOLIC BLOOD PRESSURE < 80 MM HG: ICD-10-PCS | Mod: ,,, | Performed by: NURSE PRACTITIONER

## 2023-09-12 PROCEDURE — 3008F BODY MASS INDEX DOCD: CPT | Mod: ,,, | Performed by: NURSE PRACTITIONER

## 2023-09-12 PROCEDURE — 3074F SYST BP LT 130 MM HG: CPT | Mod: ,,, | Performed by: NURSE PRACTITIONER

## 2023-09-12 PROCEDURE — 96372 THER/PROPH/DIAG INJ SC/IM: CPT | Mod: ,,, | Performed by: NURSE PRACTITIONER

## 2023-09-12 PROCEDURE — 96372 PR INJECTION,THERAP/PROPH/DIAG2ST, IM OR SUBCUT: ICD-10-PCS | Mod: ,,, | Performed by: NURSE PRACTITIONER

## 2023-09-12 PROCEDURE — 4010F ACE/ARB THERAPY RXD/TAKEN: CPT | Mod: ,,, | Performed by: NURSE PRACTITIONER

## 2023-09-12 PROCEDURE — 3008F PR BODY MASS INDEX (BMI) DOCUMENTED: ICD-10-PCS | Mod: ,,, | Performed by: NURSE PRACTITIONER

## 2023-09-12 PROCEDURE — 1159F MED LIST DOCD IN RCRD: CPT | Mod: ,,, | Performed by: NURSE PRACTITIONER

## 2023-09-12 RX ORDER — METHYLPREDNISOLONE 4 MG/1
TABLET ORAL
Qty: 21 TABLET | Refills: 0 | Status: SHIPPED | OUTPATIENT
Start: 2023-09-12

## 2023-09-12 RX ORDER — ALENDRONATE SODIUM 70 MG/1
70 TABLET ORAL
Qty: 12 TABLET | Refills: 3 | Status: SHIPPED | OUTPATIENT
Start: 2023-09-12 | End: 2024-09-11

## 2023-09-12 RX ORDER — KETOROLAC TROMETHAMINE 30 MG/ML
60 INJECTION, SOLUTION INTRAMUSCULAR; INTRAVENOUS
Status: COMPLETED | OUTPATIENT
Start: 2023-09-12 | End: 2023-09-12

## 2023-09-12 RX ORDER — DICLOFENAC SODIUM 50 MG/1
50 TABLET, DELAYED RELEASE ORAL 2 TIMES DAILY
Qty: 60 TABLET | Refills: 1 | Status: SHIPPED | OUTPATIENT
Start: 2023-09-12

## 2023-09-12 RX ORDER — ESCITALOPRAM OXALATE 20 MG/1
20 TABLET ORAL DAILY
Qty: 90 TABLET | Refills: 0 | Status: SHIPPED | OUTPATIENT
Start: 2023-09-12 | End: 2023-09-28 | Stop reason: SDUPTHER

## 2023-09-12 RX ORDER — BETAMETHASONE SODIUM PHOSPHATE AND BETAMETHASONE ACETATE 3; 3 MG/ML; MG/ML
6 INJECTION, SUSPENSION INTRA-ARTICULAR; INTRALESIONAL; INTRAMUSCULAR; SOFT TISSUE
Status: COMPLETED | OUTPATIENT
Start: 2023-09-12 | End: 2023-09-12

## 2023-09-12 RX ADMIN — KETOROLAC TROMETHAMINE 60 MG: 30 INJECTION, SOLUTION INTRAMUSCULAR; INTRAVENOUS at 02:09

## 2023-09-12 RX ADMIN — BETAMETHASONE SODIUM PHOSPHATE AND BETAMETHASONE ACETATE 6 MG: 3; 3 INJECTION, SUSPENSION INTRA-ARTICULAR; INTRALESIONAL; INTRAMUSCULAR; SOFT TISSUE at 02:09

## 2023-09-12 NOTE — PROGRESS NOTES
Vanessa Rasmussen DNP   [address] [place] [address]     PATIENT NAME: Tammy Behles  : 1960  DATE: 23  MRN: [id number]      Billing Provider: Vanessa Rasmussen DNP  Level of Service:   Patient PCP Information       Provider PCP Type    Vanessa Rasmussen DNP General            Reason for Visit / Chief Complaint: Foot Swelling       Update PCP  Update Chief Complaint         History of Present Illness / Problem Focused Workflow     Tammy Behles presents to the clinic with Foot Swelling     HPI    Review of Systems     Review of Systems     Medical / Social / Family History     Past Medical History:   Diagnosis Date    Depression     Neuropathy     Osteoporosis        Past Surgical History:   Procedure Laterality Date     SECTION      x2    ILEOSTOMY      LIVER SURGERY         Social History  Ms.  reports that she has been smoking cigarettes. She has a 40.0 pack-year smoking history. She has never used smokeless tobacco. She reports that she does not currently use alcohol. She reports current drug use. Drug: Marijuana.    Family History  Ms.'s family history includes Cancer in her father, mother, and sister; Diverticulitis in her mother; Hypertension in her father; Ulcerative colitis in her brother.    Medications and Allergies     Medications  Outpatient Medications Marked as Taking for the 23 encounter (Office Visit) with Vanessa Rasmussen DNP   Medication Sig Dispense Refill    buPROPion (WELLBUTRIN XL) 150 MG TB24 tablet Take 1 tablet (150 mg total) by mouth once daily. 90 tablet 3    ergocalciferol (ERGOCALCIFEROL) 50,000 unit Cap Take 1 capsule (50,000 Units total) by mouth every 7 days. 15 capsule 1    losartan (COZAAR) 25 MG tablet Take 1 tablet (25 mg total) by mouth once daily. 90 tablet 3    meclizine (ANTIVERT) 25 mg tablet Take 1 tablet (25 mg total) by mouth 3 (three) times daily as needed for Dizziness. 270 tablet 3    pregabalin (LYRICA) 75 MG capsule Take  "1 capsule (75 mg total) by mouth 2 (two) times daily. 180 capsule 3    [DISCONTINUED] amitriptyline (ELAVIL) 25 MG tablet Take 1 tablet (25 mg total) by mouth nightly as needed for Insomnia. 90 tablet 1    [DISCONTINUED] EScitalopram oxalate (LEXAPRO) 20 MG tablet Take 1 tablet (20 mg total) by mouth once daily. 90 tablet 0    [DISCONTINUED] folic acid (FOLVITE) 1 MG tablet Take 1 tablet (1 mg total) by mouth once daily. Bid x 1 mos then daily 90 tablet 1     Current Facility-Administered Medications for the 9/12/23 encounter (Office Visit) with Vanessa Rasmussen DNP   Medication Dose Route Frequency Provider Last Rate Last Admin    [COMPLETED] betamethasone acetate-betamethasone sodium phosphate injection 6 mg  6 mg Intramuscular 1 time in Clinic/HOD Vanessa Rasmussen DNP   6 mg at 09/12/23 1446    [COMPLETED] ketorolac injection 60 mg  60 mg Intramuscular 1 time in Clinic/HOD Vanessa Rasmussen DNP   60 mg at 09/12/23 1446       Allergies  Review of patient's allergies indicates:   Allergen Reactions    Keflex [cephalexin] Itching and Rash       Physical Examination   BP (!) 107/52 (BP Location: Left arm, Patient Position: Sitting, BP Method: Large (Automatic))   Pulse 102   Temp 98.1 °F (36.7 °C) (Oral)   Resp 18   Ht 5' 6" (1.676 m)   Wt 65.3 kg (144 lb)   SpO2 97%   BMI 23.24 kg/m²    Physical Exam  Vitals and nursing note reviewed.   Constitutional:       Appearance: Normal appearance. She is normal weight.   HENT:      Head: Normocephalic.      Nose: Nose normal.      Mouth/Throat:      Mouth: Mucous membranes are moist.   Eyes:      Pupils: Pupils are equal, round, and reactive to light.   Cardiovascular:      Rate and Rhythm: Normal rate and regular rhythm.      Pulses: Normal pulses.      Heart sounds: Normal heart sounds.   Pulmonary:      Effort: Pulmonary effort is normal.      Breath sounds: Normal breath sounds.   Abdominal:      General: Abdomen is flat. Bowel sounds are normal.      " Palpations: Abdomen is soft.      Comments: Scarring rt mid abd from ileostomy reversal.   Musculoskeletal:         General: Tenderness present.      Cervical back: Normal range of motion and neck supple.      Comments: Cane and partner to assist with walking and getting around.  Bilateral foot pain worse in arch  Painful needle like sensations     Skin:     General: Skin is warm and dry.      Capillary Refill: Capillary refill takes less than 2 seconds.   Neurological:      General: No focal deficit present.      Mental Status: She is alert and oriented to person, place, and time. Mental status is at baseline.   Psychiatric:         Mood and Affect: Mood normal.         Behavior: Behavior normal.         Thought Content: Thought content normal.         Judgment: Judgment normal.          Assessment and Plan (including Health Maintenance)      Problem List  Smart Lumicity  Document Outside HM   :    Plan:   Has been to PT in the past  Recc frozen water bottle stretches  Sees neuro - back on lyrica.   Stopped her amitriptyline      Health Maintenance Due   Topic Date Due    TETANUS VACCINE  Never done    High Dose Statin  Never done    Shingles Vaccine (1 of 2) Never done    Influenza Vaccine (1) 09/01/2023    Mammogram  10/13/2023       Problem List Items Addressed This Visit          Neuro    Foot drop, bilateral       Endocrine    BMI 22.0-22.9, adult       Orthopedic    Tendonitis of ankle or foot - Primary       Other    Frequent falls    Tobacco use       Health Maintenance Topics with due status: Not Due       Topic Last Completion Date    Cervical Cancer Screening 10/13/2022    Lipid Panel 02/22/2023    Colorectal Cancer Screening 05/07/2023    LDCT Lung Screen 05/19/2023       Future Appointments   Date Time Provider Department Center   10/2/2023 10:15 AM Vanessa Rasmussen DNP [facility]   10/12/2023  8:45 AM Padma Nam MD OB [facility]ARD [facility]   10/23/2023  9:00 AM AWV NURSE, [facility] FAMILY  MEDICINE [facility] [place]            Signature:  Vanessa Rasmussen, DNP      [address] [place] [address]    Date of encounter: 9/12/23

## 2023-09-28 RX ORDER — ESCITALOPRAM OXALATE 20 MG/1
20 TABLET ORAL DAILY
Qty: 90 TABLET | Refills: 0 | Status: SHIPPED | OUTPATIENT
Start: 2023-09-28

## 2023-09-28 NOTE — TELEPHONE ENCOUNTER
----- Message from Claire Rdz sent at 9/28/2023  9:24 AM CDT -----  Patient needs refills on her Lexipro sent to [facility] in [place] [phone].

## 2023-10-18 ENCOUNTER — OFFICE VISIT (OUTPATIENT)
Dept: FAMILY MEDICINE | Facility: CLINIC | Age: 63
End: 2023-10-18
Payer: MEDICARE

## 2023-10-18 VITALS
HEART RATE: 96 BPM | OXYGEN SATURATION: 92 % | BODY MASS INDEX: 22.79 KG/M2 | HEIGHT: 66 IN | DIASTOLIC BLOOD PRESSURE: 55 MMHG | SYSTOLIC BLOOD PRESSURE: 109 MMHG | WEIGHT: 141.81 LBS | TEMPERATURE: 98 F

## 2023-10-18 DIAGNOSIS — R05.9 COUGH, UNSPECIFIED TYPE: ICD-10-CM

## 2023-10-18 DIAGNOSIS — J43.8 OTHER EMPHYSEMA: ICD-10-CM

## 2023-10-18 DIAGNOSIS — J02.9 SORE THROAT: ICD-10-CM

## 2023-10-18 DIAGNOSIS — R06.2 WHEEZING: ICD-10-CM

## 2023-10-18 DIAGNOSIS — R09.02 HYPOXIA: ICD-10-CM

## 2023-10-18 DIAGNOSIS — J40 BRONCHITIS: Primary | ICD-10-CM

## 2023-10-18 LAB
CTP QC/QA: YES
FLUAV AG NPH QL: NEGATIVE
FLUBV AG NPH QL: NEGATIVE
S PYO RRNA THROAT QL PROBE: NEGATIVE
SARS-COV-2 AG RESP QL IA.RAPID: NEGATIVE

## 2023-10-18 PROCEDURE — 3074F PR MOST RECENT SYSTOLIC BLOOD PRESSURE < 130 MM HG: ICD-10-PCS | Mod: ,,, | Performed by: NURSE PRACTITIONER

## 2023-10-18 PROCEDURE — 94640 AIRWAY INHALATION TREATMENT: CPT | Mod: ,,, | Performed by: NURSE PRACTITIONER

## 2023-10-18 PROCEDURE — 3078F DIAST BP <80 MM HG: CPT | Mod: ,,, | Performed by: NURSE PRACTITIONER

## 2023-10-18 PROCEDURE — 96372 THER/PROPH/DIAG INJ SC/IM: CPT | Mod: 59,,, | Performed by: NURSE PRACTITIONER

## 2023-10-18 PROCEDURE — 99213 OFFICE O/P EST LOW 20 MIN: CPT | Mod: 25,,, | Performed by: NURSE PRACTITIONER

## 2023-10-18 PROCEDURE — 1159F MED LIST DOCD IN RCRD: CPT | Mod: ,,, | Performed by: NURSE PRACTITIONER

## 2023-10-18 PROCEDURE — 87880 STREP A ASSAY W/OPTIC: CPT | Mod: RHCUB | Performed by: NURSE PRACTITIONER

## 2023-10-18 PROCEDURE — 1159F PR MEDICATION LIST DOCUMENTED IN MEDICAL RECORD: ICD-10-PCS | Mod: ,,, | Performed by: NURSE PRACTITIONER

## 2023-10-18 PROCEDURE — 94640 PR INHAL RX, AIRWAY OBST/DX SPUTUM INDUCT: ICD-10-PCS | Mod: ,,, | Performed by: NURSE PRACTITIONER

## 2023-10-18 PROCEDURE — 87426 SARSCOV CORONAVIRUS AG IA: CPT | Mod: RHCUB | Performed by: NURSE PRACTITIONER

## 2023-10-18 PROCEDURE — 4010F PR ACE/ARB THEARPY RXD/TAKEN: ICD-10-PCS | Mod: ,,, | Performed by: NURSE PRACTITIONER

## 2023-10-18 PROCEDURE — 3074F SYST BP LT 130 MM HG: CPT | Mod: ,,, | Performed by: NURSE PRACTITIONER

## 2023-10-18 PROCEDURE — 96372 PR INJECTION,THERAP/PROPH/DIAG2ST, IM OR SUBCUT: ICD-10-PCS | Mod: 59,,, | Performed by: NURSE PRACTITIONER

## 2023-10-18 PROCEDURE — 4010F ACE/ARB THERAPY RXD/TAKEN: CPT | Mod: ,,, | Performed by: NURSE PRACTITIONER

## 2023-10-18 PROCEDURE — 3078F PR MOST RECENT DIASTOLIC BLOOD PRESSURE < 80 MM HG: ICD-10-PCS | Mod: ,,, | Performed by: NURSE PRACTITIONER

## 2023-10-18 PROCEDURE — 3008F PR BODY MASS INDEX (BMI) DOCUMENTED: ICD-10-PCS | Mod: ,,, | Performed by: NURSE PRACTITIONER

## 2023-10-18 PROCEDURE — 3008F BODY MASS INDEX DOCD: CPT | Mod: ,,, | Performed by: NURSE PRACTITIONER

## 2023-10-18 PROCEDURE — 99213 PR OFFICE/OUTPT VISIT, EST, LEVL III, 20-29 MIN: ICD-10-PCS | Mod: 25,,, | Performed by: NURSE PRACTITIONER

## 2023-10-18 PROCEDURE — 87804 INFLUENZA ASSAY W/OPTIC: CPT | Mod: 59,RHCUB | Performed by: NURSE PRACTITIONER

## 2023-10-18 RX ORDER — ALBUTEROL SULFATE 0.83 MG/ML
2.5 SOLUTION RESPIRATORY (INHALATION)
Status: COMPLETED | OUTPATIENT
Start: 2023-10-18 | End: 2023-10-18

## 2023-10-18 RX ORDER — BENZONATATE 100 MG/1
100 CAPSULE ORAL 3 TIMES DAILY PRN
Qty: 60 CAPSULE | Refills: 0 | Status: SHIPPED | OUTPATIENT
Start: 2023-10-18 | End: 2023-10-28

## 2023-10-18 RX ORDER — BETAMETHASONE SODIUM PHOSPHATE AND BETAMETHASONE ACETATE 3; 3 MG/ML; MG/ML
6 INJECTION, SUSPENSION INTRA-ARTICULAR; INTRALESIONAL; INTRAMUSCULAR; SOFT TISSUE
Status: COMPLETED | OUTPATIENT
Start: 2023-10-18 | End: 2023-10-18

## 2023-10-18 RX ORDER — ALBUTEROL SULFATE 0.83 MG/ML
2.5 SOLUTION RESPIRATORY (INHALATION) EVERY 6 HOURS PRN
Qty: 75 ML | Refills: 0 | Status: SHIPPED | OUTPATIENT
Start: 2023-10-18 | End: 2023-11-01

## 2023-10-18 RX ORDER — CEFTRIAXONE 1 G/1
1 INJECTION, POWDER, FOR SOLUTION INTRAMUSCULAR; INTRAVENOUS
Status: COMPLETED | OUTPATIENT
Start: 2023-10-18 | End: 2023-10-18

## 2023-10-18 RX ORDER — CYANOCOBALAMIN 1000 UG/ML
1000 INJECTION, SOLUTION INTRAMUSCULAR; SUBCUTANEOUS
Status: COMPLETED | OUTPATIENT
Start: 2023-10-18 | End: 2023-10-18

## 2023-10-18 RX ORDER — METHYLPREDNISOLONE 4 MG/1
TABLET ORAL
Qty: 21 TABLET | Refills: 0 | Status: SHIPPED | OUTPATIENT
Start: 2023-10-18

## 2023-10-18 RX ORDER — LEVOFLOXACIN 750 MG/1
750 TABLET ORAL DAILY
Qty: 5 TABLET | Refills: 0 | Status: SHIPPED | OUTPATIENT
Start: 2023-10-18 | End: 2023-10-23

## 2023-10-18 RX ADMIN — ALBUTEROL SULFATE 2.5 MG: 0.83 SOLUTION RESPIRATORY (INHALATION) at 11:10

## 2023-10-18 RX ADMIN — BETAMETHASONE SODIUM PHOSPHATE AND BETAMETHASONE ACETATE 6 MG: 3; 3 INJECTION, SUSPENSION INTRA-ARTICULAR; INTRALESIONAL; INTRAMUSCULAR; SOFT TISSUE at 11:10

## 2023-10-18 RX ADMIN — CYANOCOBALAMIN 1000 MCG: 1000 INJECTION, SOLUTION INTRAMUSCULAR; SUBCUTANEOUS at 11:10

## 2023-10-18 RX ADMIN — CEFTRIAXONE 1 G: 1 INJECTION, POWDER, FOR SOLUTION INTRAMUSCULAR; INTRAVENOUS at 11:10

## 2023-10-18 NOTE — PROGRESS NOTES
Vanessa Rasmussen DNP   [address] [place] [address]     PATIENT NAME: Tammy Behles  : 1960  DATE: 10/18/23  MRN: [id number]      Billing Provider: Vanessa Rasmussen DNP  Level of Service:   Patient PCP Information       Provider PCP Type    Vanessa Rasmussen DNP General            Reason for Visit / Chief Complaint: Cough and Nasal Congestion       Update PCP  Update Chief Complaint         History of Present Illness / Problem Focused Workflow     Tammy Behles presents to the clinic with Cough and Nasal Congestion     Cough        Review of Systems     Review of Systems   Respiratory:  Positive for cough.         Medical / Social / Family History     Past Medical History:   Diagnosis Date    Depression     Neuropathy     Osteoporosis        Past Surgical History:   Procedure Laterality Date     SECTION      x2    ILEOSTOMY      LIVER SURGERY         Social History  Ms.  reports that she has been smoking cigarettes. She has a 40.0 pack-year smoking history. She has never used smokeless tobacco. She reports that she does not currently use alcohol. She reports current drug use. Drug: Marijuana.    Family History  Ms.'s family history includes Cancer in her father, mother, and sister; Diverticulitis in her mother; Hypertension in her father; Ulcerative colitis in her brother.    Medications and Allergies     Medications  No outpatient medications have been marked as taking for the 10/18/23 encounter (Office Visit) with Vanessa Rasmussen DNP.     Current Facility-Administered Medications for the 10/18/23 encounter (Office Visit) with Vanessa Rasmussen DNP   Medication Dose Route Frequency Provider Last Rate Last Admin    [COMPLETED] albuterol nebulizer solution 2.5 mg  2.5 mg Nebulization 1 time in Clinic/HOD Vanessa Rasmussen DNP   2.5 mg at 10/18/23 1157    [COMPLETED] betamethasone acetate-betamethasone sodium phosphate injection 6 mg  6 mg Intramuscular 1 time in Clinic/HOD  "Vanessa Rasmussen, DNP   6 mg at 10/18/23 1157    [COMPLETED] cefTRIAXone injection 1 g  1 g Intramuscular 1 time in Clinic/HOD Vanessa Rasmussen DNP   1 g at 10/18/23 1158    [COMPLETED] cyanocobalamin injection 1,000 mcg  1,000 mcg Intramuscular 1 time in Clinic/HOD Vanessa Rasmussen DNP   1,000 mcg at 10/18/23 1158       Allergies  Review of patient's allergies indicates:   Allergen Reactions    Keflex [cephalexin] Itching and Rash       Physical Examination   BP (!) 109/55   Pulse 96   Temp 98.3 °F (36.8 °C)   Ht 5' 6" (1.676 m)   Wt 64.3 kg (141 lb 12.8 oz)   SpO2 (!) 92% Comment: post neb  BMI 22.89 kg/m²    Physical Exam  Vitals and nursing note reviewed.   Constitutional:       Appearance: She is ill-appearing.   HENT:      Head: Normocephalic.      Nose: Congestion and rhinorrhea present.      Mouth/Throat:      Mouth: Mucous membranes are moist.      Pharynx: Posterior oropharyngeal erythema present.   Eyes:      Extraocular Movements: Extraocular movements intact.      Conjunctiva/sclera: Conjunctivae normal.      Pupils: Pupils are equal, round, and reactive to light.   Cardiovascular:      Rate and Rhythm: Normal rate and regular rhythm.      Pulses: Normal pulses.      Heart sounds: Normal heart sounds.   Pulmonary:      Effort: Respiratory distress present.      Breath sounds: Wheezing present.   Chest:      Chest wall: Tenderness present.   Musculoskeletal:      Cervical back: Normal range of motion.   Lymphadenopathy:      Cervical: Cervical adenopathy present.   Skin:     General: Skin is warm and dry.      Capillary Refill: Capillary refill takes less than 2 seconds.   Neurological:      General: No focal deficit present.      Mental Status: She is alert and oriented to person, place, and time. Mental status is at baseline.   Psychiatric:         Mood and Affect: Mood normal.         Behavior: Behavior normal.         Thought Content: Thought content normal.         Judgment: Judgment " normal.          Assessment and Plan (including Health Maintenance)      Problem List  Smart Sets  Document Outside HM   :    Plan:     90% on arrival   After breathing tx states she felt like she could breathe better  92% on RA after tx   Rx nebulizer machine sent to [facility]    If worsen call 911 or seek ER       Health Maintenance Due   Topic Date Due    COVID-19 Vaccine (1) Never done    TETANUS VACCINE  Never done    High Dose Statin  Never done    Shingles Vaccine (1 of 2) Never done    Influenza Vaccine (1) 09/01/2023    Mammogram  10/13/2023       Problem List Items Addressed This Visit          ENT    Sore throat    Relevant Orders    SARS Coronavirus 2 Antigen, POCT (Completed)    POCT Influenza A/B (Completed)    POCT rapid strep A (Completed)       Pulmonary    Other emphysema    Bronchitis - Primary    Hypoxia    Wheezing    Relevant Orders    Inhalation Treatment    Cough    Relevant Orders    SARS Coronavirus 2 Antigen, POCT (Completed)    POCT Influenza A/B (Completed)    POCT rapid strep A (Completed)    Inhalation Treatment       Health Maintenance Topics with due status: Not Due       Topic Last Completion Date    Cervical Cancer Screening 10/13/2022    Lipid Panel 02/22/2023    Colorectal Cancer Screening 05/07/2023    LDCT Lung Screen 05/19/2023       Future Appointments   Date Time Provider Department Center   10/23/2023  9:00 AM AWV NURSE, [facility] MEDIC[facility] [place]            Signature:  Vanessa Rasmussen, YEYO      [address] [place] [address]    Date of encounter: 10/18/23

## 2023-10-31 ENCOUNTER — OFFICE VISIT (OUTPATIENT)
Dept: FAMILY MEDICINE | Facility: CLINIC | Age: 63
End: 2023-10-31
Payer: MEDICARE

## 2023-10-31 ENCOUNTER — CLINICAL SUPPORT (OUTPATIENT)
Dept: FAMILY MEDICINE | Facility: CLINIC | Age: 63
End: 2023-10-31
Payer: MEDICARE

## 2023-10-31 ENCOUNTER — APPOINTMENT (OUTPATIENT)
Dept: RADIOLOGY | Facility: CLINIC | Age: 63
End: 2023-10-31
Attending: NURSE PRACTITIONER
Payer: MEDICARE

## 2023-10-31 VITALS
OXYGEN SATURATION: 97 % | DIASTOLIC BLOOD PRESSURE: 62 MMHG | HEIGHT: 66 IN | BODY MASS INDEX: 22.66 KG/M2 | SYSTOLIC BLOOD PRESSURE: 112 MMHG | RESPIRATION RATE: 22 BRPM | WEIGHT: 141 LBS | HEART RATE: 80 BPM

## 2023-10-31 DIAGNOSIS — F17.210 NICOTINE DEPENDENCE, CIGARETTES, UNCOMPLICATED: ICD-10-CM

## 2023-10-31 DIAGNOSIS — Z00.00 ENCOUNTER FOR SUBSEQUENT ANNUAL WELLNESS VISIT (AWV) IN MEDICARE PATIENT: Primary | ICD-10-CM

## 2023-10-31 DIAGNOSIS — J40 BRONCHITIS: Primary | ICD-10-CM

## 2023-10-31 DIAGNOSIS — Z74.09 OTHER REDUCED MOBILITY: ICD-10-CM

## 2023-10-31 DIAGNOSIS — Z91.81 HISTORY OF FALLING: ICD-10-CM

## 2023-10-31 DIAGNOSIS — R26.9 ABNORMALITY OF GAIT AND MOBILITY: ICD-10-CM

## 2023-10-31 DIAGNOSIS — F33.0 MILD EPISODE OF RECURRENT MAJOR DEPRESSIVE DISORDER: ICD-10-CM

## 2023-10-31 DIAGNOSIS — Z79.899 OTHER LONG TERM (CURRENT) DRUG THERAPY: ICD-10-CM

## 2023-10-31 DIAGNOSIS — J40 BRONCHITIS: ICD-10-CM

## 2023-10-31 DIAGNOSIS — I10 HYPERTENSION, UNSPECIFIED TYPE: ICD-10-CM

## 2023-10-31 DIAGNOSIS — M21.371 FOOT DROP, BILATERAL: ICD-10-CM

## 2023-10-31 DIAGNOSIS — R06.2 WHEEZING: ICD-10-CM

## 2023-10-31 DIAGNOSIS — M21.372 FOOT DROP, BILATERAL: ICD-10-CM

## 2023-10-31 PROCEDURE — 1159F PR MEDICATION LIST DOCUMENTED IN MEDICAL RECORD: ICD-10-PCS | Mod: ,,, | Performed by: NURSE PRACTITIONER

## 2023-10-31 PROCEDURE — 3078F DIAST BP <80 MM HG: CPT | Mod: ,,, | Performed by: NURSE PRACTITIONER

## 2023-10-31 PROCEDURE — 4010F ACE/ARB THERAPY RXD/TAKEN: CPT | Mod: ,,, | Performed by: NURSE PRACTITIONER

## 2023-10-31 PROCEDURE — 3078F PR MOST RECENT DIASTOLIC BLOOD PRESSURE < 80 MM HG: ICD-10-PCS | Mod: ,,, | Performed by: NURSE PRACTITIONER

## 2023-10-31 PROCEDURE — 4010F PR ACE/ARB THEARPY RXD/TAKEN: ICD-10-PCS | Mod: ,,, | Performed by: NURSE PRACTITIONER

## 2023-10-31 PROCEDURE — 71046 XR CHEST PA AND LATERAL: ICD-10-PCS | Mod: 26,,, | Performed by: RADIOLOGY

## 2023-10-31 PROCEDURE — 1159F MED LIST DOCD IN RCRD: CPT | Mod: ,,, | Performed by: NURSE PRACTITIONER

## 2023-10-31 PROCEDURE — 3074F SYST BP LT 130 MM HG: CPT | Mod: ,,, | Performed by: NURSE PRACTITIONER

## 2023-10-31 PROCEDURE — G0439 PPPS, SUBSEQ VISIT: HCPCS | Mod: ,,, | Performed by: NURSE PRACTITIONER

## 2023-10-31 PROCEDURE — 71046 X-RAY EXAM CHEST 2 VIEWS: CPT | Mod: 26,,, | Performed by: RADIOLOGY

## 2023-10-31 PROCEDURE — G0439 PR MEDICARE ANNUAL WELLNESS SUBSEQUENT VISIT: ICD-10-PCS | Mod: ,,, | Performed by: NURSE PRACTITIONER

## 2023-10-31 PROCEDURE — 3074F PR MOST RECENT SYSTOLIC BLOOD PRESSURE < 130 MM HG: ICD-10-PCS | Mod: ,,, | Performed by: NURSE PRACTITIONER

## 2023-10-31 PROCEDURE — 71046 X-RAY EXAM CHEST 2 VIEWS: CPT | Mod: TC,RHCUB,FY | Performed by: NURSE PRACTITIONER

## 2023-10-31 RX ORDER — GABAPENTIN 100 MG/1
200 CAPSULE ORAL 3 TIMES DAILY
Status: CANCELLED | OUTPATIENT
Start: 2023-10-31 | End: 2024-10-30

## 2023-10-31 NOTE — PATIENT INSTRUCTIONS
Counseling and Referral of Other Preventative  (Italic type indicates deductible and co-insurance are waived)    Patient Name: Tammy Behles  Today's Date: 10/31/2023    Health Maintenance       Date Due Completion Date    COVID-19 Vaccine (1) Never done ---    TETANUS VACCINE Never done ---    High Dose Statin Never done ---    Shingles Vaccine (1 of 2) Never done ---    RSV Vaccine (Age 60+) (1 - 1-dose 60+ series) Never done ---    Influenza Vaccine (1) 09/01/2023 9/20/2022    Mammogram 10/13/2023 10/13/2022    LDCT Lung Screen 05/19/2024 5/19/2023    Cervical Cancer Screening 10/13/2025 10/13/2022    Colorectal Cancer Screening 05/07/2026 5/7/2023    Lipid Panel 02/22/2028 2/22/2023        No orders of the defined types were placed in this encounter.    The following information is provided to all patients.  This information is to help you find resources for any of the problems found today that may be affecting your health:                Living healthy guide: www.[facility].[place].gov      Understanding Diabetes: www.diabetes.org      Eating healthy: www.cdc.gov/healthyweight      CDC home safety checklist: www.cdc.gov/steadi/patient.html      Agency on Aging: www.goea.[place].gov      Alcoholics anonymous (AA): www.aa.org      Physical Activity: www.mehnaz.nih.gov/ke8uvdv      Tobacco use: www.quitwithusla.org

## 2023-10-31 NOTE — PROGRESS NOTES
"   [facility]       PATIENT NAME: Tammy Behles   : 1960    AGE: 63 y.o. DATE: 10/31/2023   MRN: [id number]        Tammy Behles presented for a  Medicare AWV and comprehensive Health Risk Assessment today. The following components were reviewed and updated:    Medical history  Family History  Social history  Allergies and Current Medications  Health Risk Assessment  Health Maintenance  Care Team         ** See Completed Assessments for Annual Wellness Visit within the encounter summary.**         The following assessments were completed:  Living Situation  CAGE  Depression Screening  Timed Get Up and Go  Whisper Test  Cognitive Function Screening  Nutrition Screening  ADL Screening  PAQ Screening          Vitals:    10/31/23 1121   BP: 112/62   BP Location: Left arm   Patient Position: Sitting   BP Method: Large (Automatic)   Pulse: 80   Resp: (!) 22   SpO2: 97%   Weight: 64 kg (141 lb)   Height: 5' 6" (1.676 m)     Body mass index is 22.76 kg/m².  Physical Exam  Vitals and nursing note reviewed.   Constitutional:       Appearance: Normal appearance. She is normal weight. She is ill-appearing.   HENT:      Head: Normocephalic.      Nose: Nose normal.      Mouth/Throat:      Mouth: Mucous membranes are moist.   Eyes:      Pupils: Pupils are equal, round, and reactive to light.   Cardiovascular:      Rate and Rhythm: Normal rate and regular rhythm.      Pulses: Normal pulses.      Heart sounds: Normal heart sounds.   Pulmonary:      Effort: Pulmonary effort is normal.      Breath sounds: Wheezing present.   Chest:      Chest wall: Tenderness present.   Abdominal:      General: Abdomen is flat. Bowel sounds are normal.      Palpations: Abdomen is soft.      Comments: Scarring rt mid abd from ileostomy reversal.   Musculoskeletal:      Cervical back: Normal range of motion and neck supple.      Comments: Cane and partner to assist with walking and " getting around.   Skin:     General: Skin is warm and dry.      Capillary Refill: Capillary refill takes less than 2 seconds.   Neurological:      General: No focal deficit present.      Mental Status: She is alert and oriented to person, place, and time. Mental status is at baseline.   Psychiatric:         Mood and Affect: Mood normal.         Behavior: Behavior normal.         Thought Content: Thought content normal.         Judgment: Judgment normal.              Diagnoses and health risks identified today and associated recommendations/orders:    1. Encounter for subsequent annual wellness visit (AWV) in Medicare patient  Gave appt reminder for next year's AWV    2. Nicotine dependence, cigarettes, uncomplicated  Encouraged smoking cessation & education given to patient.    3. Hypertension, unspecified type  Controlled with medication     4. Foot drop, bilateral  In PT at this time.     5. Bronchitis  Will repeat xray today - pending report    6. BMI 22.0-22.9, adult    7. Mild episode of recurrent major depressive disorder  Controlled with medication at this time. No changes needed    8. Abnormality of gait and mobility  Continue use of assistive devices    9. Other reduced mobility  Prevent falls by wearing good non-skid shoes    10. Wheezing  - X-Ray Chest PA And Lateral    Will postpone flu vaccine due to recent acute illness that patient is still recovering from.      Provided Tamela with a 5-10 year written screening schedule and personal prevention plan. Recommendations were developed using the USPSTF age appropriate recommendations. Education, counseling, and referrals were provided as needed. After Visit Summary printed and given to patient which includes a list of additional screenings\tests needed.    Follow up in about 1 year (around 10/31/2024) for AWV follow-up.    Shahnaz Iyer RN    I offered to discuss advanced care planning, including how to pick a person who would make decisions for you if  you were unable to make them for yourself, called a health care power of , and what kind of decisions you might make such as use of life sustaining treatments such as ventilators and tube feeding when faced with a life limiting illness recorded on a living will that they will need to know. (How you want to be cared for as you near the end of your natural life)     X Patient is interested in learning more about how to make advanced directives.  I provided them paperwork and offered to discuss this with them.    Signature:

## 2023-11-01 PROBLEM — R26.9 ABNORMALITY OF GAIT AND MOBILITY: Status: ACTIVE | Noted: 2023-11-01

## 2023-11-01 RX ORDER — ALBUTEROL SULFATE 0.83 MG/ML
2.5 SOLUTION RESPIRATORY (INHALATION) EVERY 6 HOURS PRN
Qty: 75 ML | Refills: 0 | Status: SHIPPED | OUTPATIENT
Start: 2023-11-01 | End: 2024-10-31

## 2023-11-01 RX ORDER — ALBUTEROL SULFATE 0.83 MG/ML
2.5 SOLUTION RESPIRATORY (INHALATION) EVERY 6 HOURS PRN
Qty: 90 ML | Refills: 0 | Status: SHIPPED | OUTPATIENT
Start: 2023-11-01 | End: 2024-10-31

## 2023-11-03 ENCOUNTER — TELEPHONE (OUTPATIENT)
Dept: FAMILY MEDICINE | Facility: CLINIC | Age: 63
End: 2023-11-03
Payer: MEDICARE

## 2023-11-03 DIAGNOSIS — R05.9 COUGH, UNSPECIFIED TYPE: ICD-10-CM

## 2023-11-03 DIAGNOSIS — R91.1 SOLITARY PULMONARY NODULE: ICD-10-CM

## 2023-11-03 DIAGNOSIS — R93.89 ABNORMAL CHEST X-RAY: ICD-10-CM

## 2023-11-03 DIAGNOSIS — Z01.812 PRE-PROCEDURE LAB EXAM: Primary | ICD-10-CM

## 2023-11-03 RX ORDER — AZITHROMYCIN 250 MG/1
TABLET, FILM COATED ORAL
Qty: 6 TABLET | Refills: 0 | Status: SHIPPED | OUTPATIENT
Start: 2023-11-03

## 2023-11-03 NOTE — PROGRESS NOTES
I have added a zpack to her regimen at this time for possible pneumonia. She needs a ct chest with contrast. For abn cxr - lung mass

## 2023-11-03 NOTE — TELEPHONE ENCOUNTER
----- Message from Vanessa Rasmussen, YEYO sent at 11/3/2023  7:57 AM CDT -----  I have added a zpack to her regimen at this time for possible pneumonia. She needs a ct chest with contrast. For abn cxr - lung mass

## 2023-11-06 RX ORDER — GABAPENTIN 100 MG/1
200 CAPSULE ORAL 3 TIMES DAILY
Qty: 180 CAPSULE | Refills: 2 | Status: SHIPPED | OUTPATIENT
Start: 2023-11-06 | End: 2024-04-01 | Stop reason: SDUPTHER

## 2023-11-06 NOTE — TELEPHONE ENCOUNTER
----- Message from Carlyn Dowd sent at 11/6/2023  8:52 AM CST -----  Regarding: refill  Patient needs a refill on gabapentin called into [facility] at [phone].  Please call patient at [phone] if you have any questions. Thanks!

## 2023-11-21 ENCOUNTER — HOSPITAL ENCOUNTER (OUTPATIENT)
Dept: RADIOLOGY | Facility: HOSPITAL | Age: 63
Discharge: HOME | End: 2023-11-21
Attending: NURSE PRACTITIONER
Payer: MEDICARE

## 2023-11-21 DIAGNOSIS — R16.0 HEPATOMEGALY, NOT ELSEWHERE CLASSIFIED: Primary | ICD-10-CM

## 2023-11-21 DIAGNOSIS — R05.9 COUGH, UNSPECIFIED TYPE: ICD-10-CM

## 2023-11-21 DIAGNOSIS — K76.9 HEPATIC LESION: ICD-10-CM

## 2023-11-21 DIAGNOSIS — R93.89 ABNORMAL CHEST X-RAY: ICD-10-CM

## 2023-11-21 LAB — CREAT SERPL-MCNC: 0.6 MG/DL (ref 0.6–1.3)

## 2023-11-21 PROCEDURE — 71260 CT THORAX DX C+: CPT | Mod: TC

## 2023-11-21 PROCEDURE — 71260 CT CHEST WITH CONTRAST: ICD-10-PCS | Mod: 26,,, | Performed by: RADIOLOGY

## 2023-11-21 PROCEDURE — 71260 CT THORAX DX C+: CPT | Mod: 26,,, | Performed by: RADIOLOGY

## 2023-11-21 PROCEDURE — 82565 ASSAY OF CREATININE: CPT

## 2023-11-21 PROCEDURE — 25500020 PHARM REV CODE 255: Performed by: NURSE PRACTITIONER

## 2023-11-21 RX ADMIN — IOPAMIDOL 100 ML: 755 INJECTION, SOLUTION INTRAVENOUS at 11:11

## 2023-11-21 NOTE — PROGRESS NOTES
No pnuemonia. Cleared now. However incidental finding of a small spot on liver that needs further eval.

## 2023-11-27 ENCOUNTER — TELEPHONE (OUTPATIENT)
Dept: FAMILY MEDICINE | Facility: CLINIC | Age: 63
End: 2023-11-27
Payer: MEDICARE

## 2023-11-27 NOTE — TELEPHONE ENCOUNTER
----- Message from Geraldine Carr sent at 11/27/2023  3:33 PM CST -----  Pt is waiting for a CT scan to be scheduled. Please call at [phone]

## 2023-12-05 ENCOUNTER — TELEPHONE (OUTPATIENT)
Dept: FAMILY MEDICINE | Facility: CLINIC | Age: 63
End: 2023-12-05
Payer: MEDICARE

## 2023-12-05 DIAGNOSIS — R16.0 LIVER MASS: ICD-10-CM

## 2023-12-05 DIAGNOSIS — R16.0 HEPATOMEGALY, NOT ELSEWHERE CLASSIFIED: ICD-10-CM

## 2023-12-05 DIAGNOSIS — R10.9 ABDOMINAL PAIN, UNSPECIFIED ABDOMINAL LOCATION: Primary | ICD-10-CM

## 2023-12-05 NOTE — TELEPHONE ENCOUNTER
----- Message from Carlyn Dowd sent at 12/5/2023  9:52 AM CST -----  Regarding: CT scan  Patient is calling to see if you set up her CT scan abdomen, and pelvis. Please call her @ [phone]

## 2023-12-07 ENCOUNTER — TELEPHONE (OUTPATIENT)
Dept: FAMILY MEDICINE | Facility: CLINIC | Age: 63
End: 2023-12-07
Payer: MEDICARE

## 2023-12-07 NOTE — TELEPHONE ENCOUNTER
I got CT schedule for 12/19 at 1230 and US at 1030 same day called and let her know already also     NPO 8 hours before US and notified pt

## 2023-12-09 DIAGNOSIS — Z71.89 COMPLEX CARE COORDINATION: ICD-10-CM

## 2023-12-19 ENCOUNTER — HOSPITAL ENCOUNTER (OUTPATIENT)
Dept: RADIOLOGY | Facility: HOSPITAL | Age: 63
Discharge: HOME | End: 2023-12-19
Attending: NURSE PRACTITIONER
Payer: MEDICARE

## 2023-12-19 DIAGNOSIS — R16.0 LIVER MASS: ICD-10-CM

## 2023-12-19 DIAGNOSIS — R16.0 HEPATOMEGALY, NOT ELSEWHERE CLASSIFIED: ICD-10-CM

## 2023-12-19 DIAGNOSIS — R10.9 ABDOMINAL PAIN, UNSPECIFIED ABDOMINAL LOCATION: ICD-10-CM

## 2023-12-19 PROCEDURE — 74170 CT ABD WO CNTRST FLWD CNTRST: CPT | Mod: 26,,, | Performed by: RADIOLOGY

## 2023-12-19 PROCEDURE — 76705 ECHO EXAM OF ABDOMEN: CPT | Mod: TC

## 2023-12-19 PROCEDURE — 76705 ECHO EXAM OF ABDOMEN: CPT | Mod: 26,,, | Performed by: RADIOLOGY

## 2023-12-19 PROCEDURE — 25500020 PHARM REV CODE 255: Performed by: NURSE PRACTITIONER

## 2023-12-19 PROCEDURE — 74170 CT ABD WO CNTRST FLWD CNTRST: CPT | Mod: TC

## 2023-12-19 PROCEDURE — 76705 US ABDOMEN LIMITED: ICD-10-PCS | Mod: 26,,, | Performed by: RADIOLOGY

## 2023-12-19 PROCEDURE — 74170 CT ABDOMEN W/O CONTRAST PLUS TRIPHASIC W/CONTRAST: ICD-10-PCS | Mod: 26,,, | Performed by: RADIOLOGY

## 2023-12-19 RX ADMIN — IOPAMIDOL 100 ML: 755 INJECTION, SOLUTION INTRAVENOUS at 11:12

## 2023-12-21 ENCOUNTER — TELEPHONE (OUTPATIENT)
Dept: FAMILY MEDICINE | Facility: CLINIC | Age: 63
End: 2023-12-21
Payer: MEDICARE

## 2023-12-21 DIAGNOSIS — R10.9 ABDOMINAL PAIN, UNSPECIFIED ABDOMINAL LOCATION: ICD-10-CM

## 2023-12-21 DIAGNOSIS — K44.9 HIATAL HERNIA: Primary | ICD-10-CM

## 2023-12-21 DIAGNOSIS — K80.20 GALL STONES: ICD-10-CM

## 2023-12-21 DIAGNOSIS — S32.010A COMPRESSION FRACTURE OF L1 VERTEBRA, INITIAL ENCOUNTER: ICD-10-CM

## 2023-12-21 DIAGNOSIS — S22.080A COMPRESSION FRACTURE OF T12 VERTEBRA, INITIAL ENCOUNTER: ICD-10-CM

## 2023-12-21 NOTE — TELEPHONE ENCOUNTER
----- Message from Vanessa Rasmussen DNP sent at 12/21/2023 12:01 PM CST -----  Several things :  Cyst on liver appear benign at this time. No further workup unless liver enzymes on her labs change...   2nd - hernia - more than likely causing all the GI discomfort along with gall stones.    More than likely needs to see GI to remove gallbladder and fix hernia at same time .    Lastly - chronic compression fractures of her spine. More than likely from falls and osteoporosis prior to starting to see us. Needs to see spine doc dr dorman or pain management. So not sure if she has seen GI yet? Or if she would rather see surgeon   dr martinez ?

## 2023-12-21 NOTE — TELEPHONE ENCOUNTER
----- Message from Apple Stevens sent at 12/20/2023  3:03 PM CST -----  Pt calling to speak with nurse regarding results of recent scans she has had done - pt call back # [phone]

## 2023-12-21 NOTE — TELEPHONE ENCOUNTER
----- Message from Virginia [place] sent at 12/19/2023  2:46 PM CST -----  Regarding: RESULTS  PATIENT CALLING TO GET RESULTS, CALL BACK NUMBER [phone]

## 2023-12-21 NOTE — PROGRESS NOTES
Several things :  Cyst on liver appear benign at this time. No further workup unless liver enzymes on her labs change...   2nd - hernia - more than likely causing all the GI discomfort along with gall stones.    More than likely needs to see GI to remove gallbladder and fix hernia at same time .    Lastly - chronic compression fractures of her spine. More than likely from falls and osteoporosis prior to starting to see us. Needs to see spine doc dr dorman or pain management. So not sure if she has seen GI yet? Or if she would rather see surgeon dr martinez ?

## 2024-01-03 ENCOUNTER — OFFICE VISIT (OUTPATIENT)
Dept: SPINE | Facility: CLINIC | Age: 64
End: 2024-01-03
Payer: MEDICARE

## 2024-01-03 ENCOUNTER — HOSPITAL ENCOUNTER (OUTPATIENT)
Dept: RADIOLOGY | Facility: HOSPITAL | Age: 64
Discharge: HOME | End: 2024-01-03
Attending: NURSE PRACTITIONER
Payer: MEDICARE

## 2024-01-03 DIAGNOSIS — S32.019A CLOSED FRACTURE OF FIRST LUMBAR VERTEBRA, UNSPECIFIED FRACTURE MORPHOLOGY, INITIAL ENCOUNTER: ICD-10-CM

## 2024-01-03 DIAGNOSIS — M54.12 CERVICAL RADICULOPATHY: ICD-10-CM

## 2024-01-03 DIAGNOSIS — S32.000A COMPRESSION FRACTURE OF LUMBAR VERTEBRA, UNSPECIFIED LUMBAR VERTEBRAL LEVEL, INITIAL ENCOUNTER: ICD-10-CM

## 2024-01-03 DIAGNOSIS — S22.089A CLOSED FRACTURE OF TWELFTH THORACIC VERTEBRA, UNSPECIFIED FRACTURE MORPHOLOGY, INITIAL ENCOUNTER: ICD-10-CM

## 2024-01-03 DIAGNOSIS — S22.000A COMPRESSION FRACTURE OF THORACIC VERTEBRA, UNSPECIFIED THORACIC VERTEBRAL LEVEL, INITIAL ENCOUNTER: Primary | ICD-10-CM

## 2024-01-03 DIAGNOSIS — S32.009A TRAUMATIC FRACTURE OF LUMBAR VERTEBRA: ICD-10-CM

## 2024-01-03 DIAGNOSIS — S22.000A COMPRESSION FRACTURE OF THORACIC VERTEBRA, UNSPECIFIED THORACIC VERTEBRAL LEVEL, INITIAL ENCOUNTER: ICD-10-CM

## 2024-01-03 PROCEDURE — 72100 X-RAY EXAM L-S SPINE 2/3 VWS: CPT | Mod: 26,,, | Performed by: RADIOLOGY

## 2024-01-03 PROCEDURE — 72100 X-RAY EXAM L-S SPINE 2/3 VWS: CPT | Mod: TC

## 2024-01-03 PROCEDURE — 72070 X-RAY EXAM THORAC SPINE 2VWS: CPT | Mod: TC

## 2024-01-03 PROCEDURE — 99213 OFFICE O/P EST LOW 20 MIN: CPT | Mod: PBBFAC | Performed by: NURSE PRACTITIONER

## 2024-01-03 PROCEDURE — 99204 OFFICE O/P NEW MOD 45 MIN: CPT | Mod: S$PBB,,, | Performed by: NURSE PRACTITIONER

## 2024-01-03 PROCEDURE — 72070 X-RAY EXAM THORAC SPINE 2VWS: CPT | Mod: 26,,, | Performed by: RADIOLOGY

## 2024-01-03 NOTE — PATIENT INSTRUCTIONS
Your MRI is scheduled at [facility] for 01/11 @ 11:00  The address is [address] [place] [address]  The phone number is [phone]

## 2024-01-04 NOTE — PROGRESS NOTES
MDM/time:  Greater than 45 minutes spent on this encounter including 15 minutes reviewing imaging and notes, 20 minutes with the patient, 10 minutes documentation    ASSESSMENT:  63 y.o. female with cervical spondylosis with myeloradiculopathy, thoracic compression fracture at T12 and Lumbar compression fractures at L1 and L2, Lumbar spondylosis with radiculopathy     PLAN:  MRI cervical, thoracic and lumbar spine   Follow up after MRIs     HPI:  63 y.o. female here for evaluation of back pain, frequent falls and numbness and tingling in bilateral legs and feet.  Patient reports symptoms have been ongoing for the fast 2 years she has had multiple health issues.  Recently was at [facility] for a GI intestinal abscess, depression history of alcohol abuse in the past.  She reports she most recently has difficulty standing and has multiple falls.  06/07/2023 seen by Neurology for vertigo and dizziness and 10/2022 DEXA scan shows osteoporosis.  Patient recently had x-rays that showed T12, L1 and L2 compression fractures.  She has had multiple falls and unsure of which fall may have caused this.  She has numbness and tingling unable to feel the bottom of her feet in his using a walker for ambulation.  She also complains of decreased  strength and numbness in her hands.  Balance issues with multiple falls.  Denies bladder bowel incontinence.  Unable to stand or walk for any length time.  Currently taking gabapentin 300 mg 2 tablets 3 times a day.  She had finished physical therapy as of November 2023 was having therapy on her back and having therapy for balance.  No prior pain management.  No recent MRI.  No prior surgery.  Patient currently smokes 10 cigarettes per day.    IMAGING:  X-Ray Lumbar Spine AP And Lateral  Narrative: EXAMINATION:  XR LUMBAR SPINE AP AND LATERAL    CLINICAL HISTORY:  Wedge compression fracture of unspecified lumbar vertebra, initial encounter for closed  fracture    COMPARISON:  None    TECHNIQUE:  Frontal and lateral views of the lumbar spine.    FINDINGS:  Age indeterminate moderate compression deformities of L1 and L2 vertebral bodies.  Age indeterminate mild compression deformity of T12 vertebral body.  Moderate loss of disc space height at L2-3.  Mild marginal vertebral body osteophyte formation noted at several levels.  Scattered posterior facet arthropathy.  Moderate levoconvex curvature of the lumbar spine.  Atherosclerotic calcification demonstrated.  Impression: As above.    Point of Service: [facility]    Electronically signed by: Santhosh Dutta  Date:    2024  Time:    15:57  X-Ray Thoracic Spine AP Lateral  Narrative: EXAMINATION:  XR THORACIC SPINE AP LATERAL    CLINICAL HISTORY:  .  Wedge compression fracture of unspecified thoracic vertebra, initial encounter for closed fracture    COMPARISON:  Two-view chest x-ray 2023.  CT abdomen pelvis 2023    TECHNIQUE:  Thoracic spine AP and lateral    FINDINGS:  There is no acute thoracic compression fracture.    There is mild to moderate T12 compression which was present on the CT abdomen from 2023.  There is no focal lytic or blastic lesion.  Osteopenia.  Mild thoracic degenerative disc narrowing and minimal thoracic spondylosis  Impression: Mild to moderate T12 compression fracture without change from 2023    No acute fracture is seen    Scattered degenerative changes    Electronically signed by: Jonathan Altamirano  Date:    2024  Time:    15:44       Past Medical History:   Diagnosis Date    Depression     Neuropathy     Osteoporosis      Past Surgical History:   Procedure Laterality Date     SECTION      x2    ILEOSTOMY      LIVER SURGERY       Social History     Tobacco Use    Smoking status: Every Day     Current packs/day: 0.50     Average packs/day: 0.5 packs/day for 40.0 years (20.0 ttl pk-yrs)     Types: Cigarettes     Smokeless tobacco: Never    Tobacco comments:     Quit during years of children. Off & on since early 20s   Substance Use Topics    Alcohol use: Not Currently     Comment: Quit 1 year ago    Drug use: Yes     Types: Marijuana      Current Outpatient Medications   Medication Instructions    albuterol (PROVENTIL) 2.5 mg, Nebulization, Every 6 hours PRN, Rescue    albuterol (PROVENTIL) 2.5 mg, Nebulization, Every 6 hours PRN, Rescue    alendronate (FOSAMAX) 70 mg, Oral, Every 7 days    azithromycin (Z-LORI) 250 MG tablet Take 2 tablets by mouth on day 1; Take 1 tablet by mouth on days 2-5<BR>    buPROPion (WELLBUTRIN XL) 150 mg, Oral, Daily    diclofenac (VOLTAREN) 50 mg, Oral, 2 times daily    ergocalciferol (ERGOCALCIFEROL) 50,000 Units, Oral, Every 7 days    EScitalopram oxalate (LEXAPRO) 20 mg, Oral, Daily    gabapentin (NEURONTIN) 200 mg, Oral, 3 times daily    losartan (COZAAR) 25 mg, Oral, Daily    meclizine (ANTIVERT) 25 mg, Oral, 3 times daily PRN    methylPREDNISolone (MEDROL DOSEPACK) 4 mg tablet use as directed    methylPREDNISolone (MEDROL DOSEPACK) 4 mg tablet use as directed    pregabalin (LYRICA) 75 mg, Oral, 2 times daily        EXAM:  Constitutional  General Appearance:  There is no height or weight on file to calculate BMI., NAD  Psychiatric   Orientation: Oriented to time, oriented to place, oriented to person  Mood and Affect: Active and alert, normal mood, normal affect  Gait and Station   Appearance:  Unable to stand walk without assistance of a walker, unable to tandem gait, unable to walk on toes, unable to walk on heels    CERVICAL  Musculoskeletal System  Shoulder:  normal appearance, no instability, no tenderness, normal ROM right, normal ROM left, no pain with ROM    Cervical Spine  Inspection:  alignment normal, no muscle atrophy  Soft Tissue Palpation on the Right:  no tenderness of the paracervicals, no tenderness of the trapezius, no tenderness of the rhomboid  Soft Tissue Palpation on  the Left:  no tenderness of the paracervicals, no tenderness of the trapezius, no tenderness of the rhomboid  Bony Palpation:  no tenderness of the occipital protuberance  Active Range:     Flexion decreased, Extension decreased, Rotation to the left decreased, Rotation to the right decreased, Lateral flexion to the left decreased, Lateral flexion to the right decreased   Pain elicited on motion    Motor Strength  C5 on the right:  abduction deltoid 4/5  C5 on the left:  abduction deltoid 4/5  C6 on the Right:  flexion biceps 4/5, wrist extension 4/5  C6 on the Left:  flexion biceps 4/5, wrist extension 4/5  C7 on the Right:  extension fingers 4/5, extension triceps 4/5  C7 on the Left:  extension fingers 4/5, extension triceps 4/5  C8 on the Right:  flexion fingers 4/5  C8 on the Left:  flexion fingers 4/5  T1 on the Right:  abduction fingers 4/5  T1 on the Left:  abduction fingers 4/5    Neurological System  Sensation on the Right:  normal sensation of the extremities: right, normal median nerve distribution, normal ulnar nerve distribution  Sensation on the Left:  normal sensation of the extremities: left, normal median nerve distribution, normal ulnar nerve distribution  Biceps Reflex Right:  Hyperreflexia, Brachioradialis Reflex Right:  Hyperreflexia, Triceps Reflex Right:  Hyperreflexia  Biceps Reflex Left:  Hyperreflexia, Brachioradialis Reflex Left:  Hyperreflexia, Triceps Reflex Left:  Hyperreflexia  Special Test on the Right:  Spurlings test negative, Hoffmans reflex absent, no ankle clonus, Durkan test negative, Tinels sign negative at the elbow  Special Test on the Left:  Spurlings test negative, Hoffmans reflex absent, no ankle clonus, Durkan test negative, Tinels sign negative at the elbow    Skin:   Head and Neck:  normal   Right Upper Extremity:  normal   Left Upper Extremity:  normal    Cardiovascular:   Arterial Pulses Right:  radial right   Arterial Pulses Left:  radial left   Edema Right:   none   Edema Left:  none    LUMBAR  Musculoskeletal System   Hips: Normal appearance, no leg length discrepancy, normal motion; left, normal motion; right    Lumbar Spine                   Inspection:  Normal alignment, normal sagittal balance                  Range of motion:  Decreased flexion, extension, lateral bending, rotation. Pain with range of motion                  Bony Palpation of the Lumbar Spine:  No tenderness of the spinous process, no tenderness of the sacrum, no tenderness of the coccyx                  Bony Palpation of the Right Hip:  No tenderness of the iliac crest, no tenderness of the sciatic notch, no tenderness of the SI joint                  Bony Palpation of the Left Hip:  No tenderness of the iliac crest, no tenderness of the sciatic notch, no tenderness of the SI joint                  Soft Tissue Palpation on the Right:  No tenderness of the paraspinal region, no tenderness of the iliolumbar region                  Soft Tissue Palpation on the Left:  No tenderness of the paraspinal region, no tenderness of the iliolumbar region    Motor Strength   L1 Right:  Hip flexion iliopsoas 4/5    L1 Left:  Hip flexion iliopsoas 4/5              L2-L4 Right:  Knee extension quadriceps 4/5, tibialis anterior 4/5              L2-L4 Left:  Knee extension quadriceps 4/5, tibialis anterior 4/5   L5 Right:  Extensor hallucis llongus 4/5,    L5 Left:  Extensor hallucis longus 4/5,    S1 Right:  Plantar flexion gastrocnemius 4/5   S1 Left:  Plantar flexion gastrocnemius 4/5    Neurological System   Ankle Reflex Right:  normal   Ankle Reflex Left: normal   Knee Reflex Right:  normal   Knee Reflex Left:  normal   Sensation on the Right:  L2 normal, L3 normal, L4 normal, L5 normal, S1 normal   Sensation on the Left:  L2 normal, L3 normal, L4 normal, L5 normal, S1 normal              Special Test on the Right:  Seated straight leg raising test negative, no clonus of the ankle              Special Test on  the Left:  Seated straight leg raising test negative, no clonus of the ankle    Skin   Lumbosacral Spine:  Normal skin    Cardiovascular System   Arterial Pulses Right:  Posterior tibialis normal, dorsalis pedis normal   Arterial Pulses Left:  Posterior tibialis normal, dorsalis pedis normal   Edema Right: None   Edema Left:  None

## 2024-01-12 ENCOUNTER — DOCUMENTATION ONLY (OUTPATIENT)
Dept: SPINE | Facility: CLINIC | Age: 64
End: 2024-01-12

## 2024-01-12 NOTE — PROGRESS NOTES
MRI cervical spine 01/11/2024 at [facility] reviewed shows:  At C3-4 there is moderate bilateral foraminal stenosis   At C4-5 there is severe left-greater-than-right foraminal stenosis  At C5-6 there is severe right and moderate left foraminal stenosis   At C6-7 there is moderate left foraminal stenosis     MRI thoracic spine 01/11/2024 at [facility] reviewed shows:   There are chronic appearing compression deformities at T12 and L1 without increased STIR signal    MRI lumbar spine 01/11/2024 at [facility] reviewed shows:  There is a subacute L2 compression fracture with some increased STIR signal  At L2-3 there is moderate bilateral lateral recess stenosis.  Moderate bilateral foraminal stenosis   At L3-4 there is moderate left foraminal stenosis    No central stenosis in the cervical or thoracic spine to indicate myelopathy.  She does have a subacute L2 compression fracture.  Can consider kyphoplasty for this if she would like for back pain.

## 2024-01-17 ENCOUNTER — OFFICE VISIT (OUTPATIENT)
Dept: SPINE | Facility: CLINIC | Age: 64
End: 2024-01-17
Payer: MEDICARE

## 2024-01-17 DIAGNOSIS — S22.000A COMPRESSION FRACTURE OF THORACIC VERTEBRA, UNSPECIFIED THORACIC VERTEBRAL LEVEL, INITIAL ENCOUNTER: Primary | ICD-10-CM

## 2024-01-17 DIAGNOSIS — S32.000A COMPRESSION FRACTURE OF LUMBAR VERTEBRA, UNSPECIFIED LUMBAR VERTEBRAL LEVEL, INITIAL ENCOUNTER: ICD-10-CM

## 2024-01-17 DIAGNOSIS — M54.12 CERVICAL RADICULOPATHY: ICD-10-CM

## 2024-01-17 DIAGNOSIS — M54.16 LUMBAR RADICULOPATHY: ICD-10-CM

## 2024-01-17 PROCEDURE — 99212 OFFICE O/P EST SF 10 MIN: CPT | Mod: PBBFAC,25 | Performed by: ORTHOPAEDIC SURGERY

## 2024-01-17 PROCEDURE — 99406 BEHAV CHNG SMOKING 3-10 MIN: CPT | Mod: S$PBB,,, | Performed by: ORTHOPAEDIC SURGERY

## 2024-01-17 PROCEDURE — 99214 OFFICE O/P EST MOD 30 MIN: CPT | Mod: 25,S$PBB,, | Performed by: ORTHOPAEDIC SURGERY

## 2024-01-17 NOTE — PROGRESS NOTES
MDM/time:  Greater than 30 minutes spent on this encounter including 10 minutes reviewing imaging and notes, 15 minutes with the patient, 5 minutes documentation    ASSESSMENT:  63 y.o. female with cervical spondylosis with radiculopathy, thoracic compression fracture at T12 and Lumbar compression fractures at L1 and L2, Lumbar spondylosis with radiculopathy     PLAN:  Discussed possible L2 kyphoplasty.  She is going to consider her options.  TLSO.  EMG bilateral upper and lower extremities.  Pain referral for injections      HPI:  63 y.o. female here for repeat evaluation of back pain, frequent falls and numbness and tingling in bilateral legs and feet.  Patient reports symptoms have been ongoing for the fast 2 years she has had multiple health issues.  Recently was at [facility] for a GI intestinal abscess, depression history of alcohol abuse in the past.  She reports she most recently has difficulty standing and has multiple falls.  06/07/2023 seen by Neurology for vertigo and dizziness and 10/2022 DEXA scan shows osteoporosis.  Patient recently had x-rays that showed T12, L1 and L2 compression fractures.  She has had multiple falls and unsure of which fall may have caused this.  She has numbness and tingling unable to feel the bottom of her feet in his using a walker for ambulation.  She also complains of decreased  strength and numbness in her hands.  Balance issues with multiple falls.  Denies bladder bowel incontinence.  Unable to stand or walk for any length time.  Currently taking gabapentin 300 mg 2 tablets 3 times a day.  She had finished physical therapy as of November 2023 was having therapy on her back and having therapy for balance.  No prior pain management.  No prior surgery.  Patient currently smokes 10 cigarettes per day.    IMAGING:  MRI cervical spine 01/11/2024 at [facility] reviewed shows:  At C3-4 there is moderate bilateral foraminal stenosis   At C4-5 there is severe left-greater-than-right  foraminal stenosis  At C5-6 there is severe right and moderate left foraminal stenosis   At C6-7 there is moderate left foraminal stenosis     MRI thoracic spine 2024 at [facility] reviewed shows:   There are chronic appearing compression deformities at T12 and L1 without increased STIR signal    MRI lumbar spine 2024 at [facility] reviewed shows:  There is a subacute L2 compression fracture with some increased STIR signal  At L2-3 there is moderate bilateral lateral recess stenosis.  Moderate bilateral foraminal stenosis   At L3-4 there is moderate left foraminal stenosis     Past Medical History:   Diagnosis Date    Depression     Neuropathy     Osteoporosis      Past Surgical History:   Procedure Laterality Date     SECTION      x2    ILEOSTOMY      LIVER SURGERY       Social History     Tobacco Use    Smoking status: Every Day     Current packs/day: 0.50     Average packs/day: 0.5 packs/day for 40.0 years (20.0 ttl pk-yrs)     Types: Cigarettes    Smokeless tobacco: Never    Tobacco comments:     Quit during years of children. Off & on since early 20s   Substance Use Topics    Alcohol use: Not Currently     Comment: Quit 1 year ago    Drug use: Yes     Types: Marijuana      Current Outpatient Medications   Medication Instructions    albuterol (PROVENTIL) 2.5 mg, Nebulization, Every 6 hours PRN, Rescue    albuterol (PROVENTIL) 2.5 mg, Nebulization, Every 6 hours PRN, Rescue    alendronate (FOSAMAX) 70 mg, Oral, Every 7 days    azithromycin (Z-LORI) 250 MG tablet Take 2 tablets by mouth on day 1; Take 1 tablet by mouth on days 2-5<BR>    buPROPion (WELLBUTRIN XL) 150 mg, Oral, Daily    diclofenac (VOLTAREN) 50 mg, Oral, 2 times daily    ergocalciferol (ERGOCALCIFEROL) 50,000 Units, Oral, Every 7 days    EScitalopram oxalate (LEXAPRO) 20 mg, Oral, Daily    gabapentin (NEURONTIN) 200 mg, Oral, 3 times daily    losartan (COZAAR) 25 mg, Oral, Daily    meclizine (ANTIVERT) 25 mg, Oral, 3 times daily PRN     methylPREDNISolone (MEDROL DOSEPACK) 4 mg tablet use as directed    methylPREDNISolone (MEDROL DOSEPACK) 4 mg tablet use as directed    pregabalin (LYRICA) 75 mg, Oral, 2 times daily        EXAM:  Constitutional  General Appearance:  There is no height or weight on file to calculate BMI., NAD  Psychiatric   Orientation: Oriented to time, oriented to place, oriented to person  Mood and Affect: Active and alert, normal mood, normal affect  Gait and Station   Appearance:  Unable to stand walk without assistance of a walker, unable to tandem gait, unable to walk on toes, unable to walk on heels    CERVICAL  Musculoskeletal System  Shoulder:  normal appearance, no instability, no tenderness, normal ROM right, normal ROM left, no pain with ROM    Cervical Spine  Inspection:  alignment normal, no muscle atrophy  Soft Tissue Palpation on the Right:  no tenderness of the paracervicals, no tenderness of the trapezius, no tenderness of the rhomboid  Soft Tissue Palpation on the Left:  no tenderness of the paracervicals, no tenderness of the trapezius, no tenderness of the rhomboid  Bony Palpation:  no tenderness of the occipital protuberance  Active Range:     Flexion decreased, Extension decreased, Rotation to the left decreased, Rotation to the right decreased, Lateral flexion to the left decreased, Lateral flexion to the right decreased   Pain elicited on motion    Motor Strength  C5 on the right:  abduction deltoid 4/5  C5 on the left:  abduction deltoid 4/5  C6 on the Right:  flexion biceps 4/5, wrist extension 4/5  C6 on the Left:  flexion biceps 4/5, wrist extension 4/5  C7 on the Right:  extension fingers 4/5, extension triceps 4/5  C7 on the Left:  extension fingers 4/5, extension triceps 4/5  C8 on the Right:  flexion fingers 4/5  C8 on the Left:  flexion fingers 4/5  T1 on the Right:  abduction fingers 4/5  T1 on the Left:  abduction fingers 4/5    Neurological System  Sensation on the Right:  normal sensation of  the extremities: right, normal median nerve distribution, normal ulnar nerve distribution  Sensation on the Left:  normal sensation of the extremities: left, normal median nerve distribution, normal ulnar nerve distribution  Biceps Reflex Right:  Hyperreflexia, Brachioradialis Reflex Right:  Hyperreflexia, Triceps Reflex Right:  Hyperreflexia  Biceps Reflex Left:  Hyperreflexia, Brachioradialis Reflex Left:  Hyperreflexia, Triceps Reflex Left:  Hyperreflexia  Special Test on the Right:  Spurlings test negative, Hoffmans reflex absent, no ankle clonus, Durkan test negative, Tinels sign negative at the elbow  Special Test on the Left:  Spurlings test negative, Hoffmans reflex absent, no ankle clonus, Durkan test negative, Tinels sign negative at the elbow    Skin:   Head and Neck:  normal   Right Upper Extremity:  normal   Left Upper Extremity:  normal    Cardiovascular:   Arterial Pulses Right:  radial right   Arterial Pulses Left:  radial left   Edema Right:  none   Edema Left:  none    LUMBAR  Musculoskeletal System   Hips: Normal appearance, no leg length discrepancy, normal motion; left, normal motion; right    Lumbar Spine                   Inspection:  Normal alignment, normal sagittal balance                  Range of motion:  Decreased flexion, extension, lateral bending, rotation. Pain with range of motion                  Bony Palpation of the Lumbar Spine:  No tenderness of the spinous process, no tenderness of the sacrum, no tenderness of the coccyx                  Bony Palpation of the Right Hip:  No tenderness of the iliac crest, no tenderness of the sciatic notch, no tenderness of the SI joint                  Bony Palpation of the Left Hip:  No tenderness of the iliac crest, no tenderness of the sciatic notch, no tenderness of the SI joint                  Soft Tissue Palpation on the Right:  No tenderness of the paraspinal region, no tenderness of the iliolumbar region                  Soft Tissue  Palpation on the Left:  No tenderness of the paraspinal region, no tenderness of the iliolumbar region    Motor Strength   L1 Right:  Hip flexion iliopsoas 4/5    L1 Left:  Hip flexion iliopsoas 4/5              L2-L4 Right:  Knee extension quadriceps 4/5, tibialis anterior 4/5              L2-L4 Left:  Knee extension quadriceps 4/5, tibialis anterior 4/5   L5 Right:  Extensor hallucis llongus 4/5,    L5 Left:  Extensor hallucis longus 4/5,    S1 Right:  Plantar flexion gastrocnemius 4/5   S1 Left:  Plantar flexion gastrocnemius 4/5    Neurological System   Ankle Reflex Right:  normal   Ankle Reflex Left: normal   Knee Reflex Right:  normal   Knee Reflex Left:  normal   Sensation on the Right:  L2 normal, L3 normal, L4 normal, L5 normal, S1 normal   Sensation on the Left:  L2 normal, L3 normal, L4 normal, L5 normal, S1 normal              Special Test on the Right:  Seated straight leg raising test negative, no clonus of the ankle              Special Test on the Left:  Seated straight leg raising test negative, no clonus of the ankle    Skin   Lumbosacral Spine:  Normal skin    Cardiovascular System   Arterial Pulses Right:  Posterior tibialis normal, dorsalis pedis normal   Arterial Pulses Left:  Posterior tibialis normal, dorsalis pedis normal   Edema Right: None   Edema Left:  None

## 2024-01-17 NOTE — PROGRESS NOTES
Smoking Cessation counseling    Time spent:  3-10 minutes ([id number])    We discussed the importance, over both the short and long-term, of smoking cessation; reviewed the patient's willingness to quit; overall history and current use of tobacco smoking products; that there are a variety of methods to help with smoking diminution and cessation (stopping alone, using nicotine substitution medications/gums, Chantix, other medications, etcetera, which the patient will also discuss with his or her primary care physician); that the patient should set a date for smoking cessation; and the patient will follow up with his or her primary care physician for further support and oversight.    We also discussed that for the patient to have surgery while using nicotine, wound healing may be compromised relative to those who do not smoke; that recovery from anesthesia may sometimes be more difficult; and that quitting may decrease the heart, vascular, pulmonary effects in the short term as well as over the long term.  Smoking cessation may be useful and important for any patient who will have a fusion as part of surgery or have bone or tissue that has regrown heal (bone fusions, wound closures, etc.)  since surgical results with respect to wound healing, susceptibility to recovery from infection, bone fusion, and tissue and blood vessel health may be impaired or less optimal in smokers than nonsmokers.  We talked about the elevated risk of surgical bone fusion failure in the setting of smoking and the potential need for a higher-risk revision surgery should fusion not occur.    I reviewed this with the patient in detail and all questions were answered.  We discussed nature of the patient's condition; real alternatives and realistic options; pros and cons, risks and benefits of all the options, in detail.  I believe the patient understands the above and wishes to proceed as outlined.

## 2024-02-05 ENCOUNTER — OFFICE VISIT (OUTPATIENT)
Dept: SURGERY | Facility: CLINIC | Age: 64
End: 2024-02-05
Payer: MEDICARE

## 2024-02-05 DIAGNOSIS — K44.9 HIATAL HERNIA: Primary | ICD-10-CM

## 2024-02-05 DIAGNOSIS — K80.20 GALL STONES: ICD-10-CM

## 2024-02-05 DIAGNOSIS — R10.9 ABDOMINAL PAIN, UNSPECIFIED ABDOMINAL LOCATION: ICD-10-CM

## 2024-02-05 PROBLEM — Z00.00 ENCOUNTER FOR SUBSEQUENT ANNUAL WELLNESS VISIT (AWV) IN MEDICARE PATIENT: Status: RESOLVED | Noted: 2022-09-21 | Resolved: 2024-02-05

## 2024-02-05 PROCEDURE — 99205 OFFICE O/P NEW HI 60 MIN: CPT | Mod: S$PBB,,, | Performed by: STUDENT IN AN ORGANIZED HEALTH CARE EDUCATION/TRAINING PROGRAM

## 2024-02-05 PROCEDURE — 1159F MED LIST DOCD IN RCRD: CPT | Mod: CPTII,,, | Performed by: STUDENT IN AN ORGANIZED HEALTH CARE EDUCATION/TRAINING PROGRAM

## 2024-02-05 PROCEDURE — 99214 OFFICE O/P EST MOD 30 MIN: CPT | Mod: PBBFAC | Performed by: STUDENT IN AN ORGANIZED HEALTH CARE EDUCATION/TRAINING PROGRAM

## 2024-02-06 NOTE — PROGRESS NOTES
History & Physical    SUBJECTIVE:     History of Present Illness:  63-year-old  female presents the clinic for evaluation for gallstones and hiatal hernia.  Patient is scheduled to have some procedures done on her spine at the pain center in the next few days.  Patient was seeing her primary care provider for increased abdominal pain and reflux.  CT scan the abdomen pelvis was performed which showed some benign-appearing scattered hepatic cysts throughout the liver with no solid suspicious mass; cholelithiasis without evidence of cholecystitis; small hiatal hernia; fat containing Bochdalek hernia on the left side; mild-to-moderate compression fractures at T12 through L2, suspected be chronic in nature.  Patient states that she does have pretty significant reflux and complain she gets increased pressure and bloatedness whenever she eats; also complains of epigastric pain a proximally 1 hour after she eats.  She states she has these symptoms whenever she eats any food; no particular foods cause different symptoms.  Patient also states that she has diarrhea whenever she eats.  Denies any significant reflux whenever she lays down at night or lays supine.  Patient does have a past medical history of diverticulitis requiring sigmoid resection in 2020, along with a diverting ileostomy and eventual reversal; last colonoscopy was 10 years prior to that in 2010; she has not had a colonoscopy since then or around the time of her reversal.  Patient has been diagnosed with COPD/emphysema, hypertension, aortic atherosclerosis, osteoporosis.  Currently, Denies any chest pain/shortness of breath, nausea/vomiting/diarrhea, fever/chills.    No chief complaint on file.      Review of patient's allergies indicates:   Allergen Reactions    Keflex [cephalexin] Itching and Rash       Current Outpatient Medications   Medication Sig Dispense Refill    albuterol (PROVENTIL) 2.5 mg /3 mL (0.083 %) nebulizer solution TAKE 3 MLS (2.5  MG TOTAL) BY NEBULIZATION EVERY 6 (SIX) HOURS AS NEEDED FOR WHEEZING OR SHORTNESS OF BREATH. RESCUE 90 mL 0    albuterol (PROVENTIL) 2.5 mg /3 mL (0.083 %) nebulizer solution Take 3 mLs (2.5 mg total) by nebulization every 6 (six) hours as needed for Wheezing or Shortness of Breath. Rescue 75 mL 0    alendronate (FOSAMAX) 70 MG tablet Take 1 tablet (70 mg total) by mouth every 7 days. 12 tablet 3    azithromycin (Z-LORI) 250 MG tablet Take 2 tablets by mouth on day 1; Take 1 tablet by mouth on days 2-5 6 tablet 0    buPROPion (WELLBUTRIN XL) 150 MG TB24 tablet Take 1 tablet (150 mg total) by mouth once daily. 90 tablet 3    diclofenac (VOLTAREN) 50 MG EC tablet Take 1 tablet (50 mg total) by mouth 2 (two) times daily. 60 tablet 1    ergocalciferol (ERGOCALCIFEROL) 50,000 unit Cap Take 1 capsule (50,000 Units total) by mouth every 7 days. 15 capsule 1    EScitalopram oxalate (LEXAPRO) 20 MG tablet Take 1 tablet (20 mg total) by mouth once daily. 90 tablet 0    gabapentin (NEURONTIN) 100 MG capsule Take 2 capsules (200 mg total) by mouth 3 (three) times daily. 180 capsule 2    losartan (COZAAR) 25 MG tablet Take 1 tablet (25 mg total) by mouth once daily. 90 tablet 3    meclizine (ANTIVERT) 25 mg tablet Take 1 tablet (25 mg total) by mouth 3 (three) times daily as needed for Dizziness. 270 tablet 3    methylPREDNISolone (MEDROL DOSEPACK) 4 mg tablet use as directed 21 tablet 0    methylPREDNISolone (MEDROL DOSEPACK) 4 mg tablet use as directed 21 tablet 0    pregabalin (LYRICA) 75 MG capsule Take 1 capsule (75 mg total) by mouth 2 (two) times daily. 180 capsule 3     No current facility-administered medications for this visit.       Past Medical History:   Diagnosis Date    Depression     Neuropathy     Osteoporosis      Past Surgical History:   Procedure Laterality Date     SECTION      x2    ILEOSTOMY      LIVER SURGERY       Family History   Problem Relation Age of Onset    Cancer Mother     Diverticulitis  Mother     Hypertension Father     Cancer Father     Cancer Sister     Ulcerative colitis Brother      Social History     Tobacco Use    Smoking status: Every Day     Current packs/day: 0.50     Average packs/day: 0.5 packs/day for 40.0 years (20.0 ttl pk-yrs)     Types: Cigarettes    Smokeless tobacco: Never    Tobacco comments:     Quit during years of children. Off & on since early 20s   Substance Use Topics    Alcohol use: Not Currently     Comment: Quit 1 year ago    Drug use: Yes     Types: Marijuana        Review of Systems:  Review of Systems   Constitutional: Negative.  Negative for fatigue and unexpected weight change.   HENT: Negative.  Negative for trouble swallowing.    Eyes: Negative.    Respiratory: Negative.  Negative for chest tightness and shortness of breath.    Cardiovascular: Negative.  Negative for chest pain.   Gastrointestinal:  Positive for abdominal pain, diarrhea and nausea. Negative for blood in stool and vomiting.   Endocrine: Negative.    Genitourinary: Negative.  Negative for hematuria.   Musculoskeletal: Negative.  Negative for back pain and myalgias.   Neurological: Negative.  Negative for dizziness, speech difficulty, weakness and light-headedness.   Psychiatric/Behavioral: Negative.  Negative for agitation and behavioral problems.        OBJECTIVE:     Vital Signs (Most Recent)              Physical Exam:  Physical Exam  Constitutional:       General: She is not in acute distress.     Appearance: Normal appearance.   HENT:      Head: Normocephalic.   Cardiovascular:      Rate and Rhythm: Normal rate.   Pulmonary:      Effort: Pulmonary effort is normal. No respiratory distress.   Abdominal:      General: There is no distension.      Tenderness: There is abdominal tenderness in the epigastric area.          Comments: Well-healed ileostomy site, lower abdominal incisions from sigmoid resection and    Musculoskeletal:         General: Normal range of motion.   Skin:      General: Skin is warm.      Coloration: Skin is not jaundiced.   Neurological:      General: No focal deficit present.      Mental Status: She is alert and oriented to person, place, and time.      Cranial Nerves: No cranial nerve deficit.         ASSESSMENT/PLAN:     Small hiatal hernia, cholelithiasis, left Bochdalek hernia    PLAN:Plan     Patient will need cardiopulmonary clearance; we will send to Dr. Sullivan for workup.    We will set up for an upper GI with esophagram to check esophageal motility; patient will also need referral to GI for EGD; patient also needs colonoscopy, which will be at GIs discretion whether they do it at the same time with a EGD or at a different time.      Patient will then be set up for a robot assisted hiatal hernia repair with full or partial fundoplication, possible repair of Bochdalek hernia.  Still having symptoms after repair, patient will then undergo robotic cholecystectomy at a later date; we will not do all procedure of the same time due to increased risk of infection, especially with meshes.      Patient verbalized understanding and wishes to proceed with the stated plan.

## 2024-02-26 ENCOUNTER — TELEPHONE (OUTPATIENT)
Dept: SURGERY | Facility: CLINIC | Age: 64
End: 2024-02-26

## 2024-02-26 NOTE — TELEPHONE ENCOUNTER
Pt called wanting to know when surgery date was. Informed pt that surgery will be set up once we have cardiopulmonary clearance from Dr. Sullivan. Pt has appointment to see Dr. Sullivan on 3/25/24 at 1300. Pt voiced understanding.

## 2024-02-26 NOTE — TELEPHONE ENCOUNTER
----- Message from Melvina Barry sent at 2/26/2024  9:54 AM CST -----  Patient need for you to call her back she want to know have you set her up for surgery, call back number [phone]

## 2024-03-21 ENCOUNTER — OFFICE VISIT (OUTPATIENT)
Dept: GASTROENTEROLOGY | Facility: CLINIC | Age: 64
End: 2024-03-21
Payer: MEDICARE

## 2024-03-21 VITALS
HEIGHT: 66 IN | HEART RATE: 67 BPM | DIASTOLIC BLOOD PRESSURE: 41 MMHG | BODY MASS INDEX: 20.89 KG/M2 | SYSTOLIC BLOOD PRESSURE: 119 MMHG | WEIGHT: 130 LBS

## 2024-03-21 DIAGNOSIS — R10.13 EPIGASTRIC PAIN: ICD-10-CM

## 2024-03-21 DIAGNOSIS — K21.9 GASTROESOPHAGEAL REFLUX DISEASE, UNSPECIFIED WHETHER ESOPHAGITIS PRESENT: ICD-10-CM

## 2024-03-21 DIAGNOSIS — Z12.11 SCREENING FOR COLON CANCER: ICD-10-CM

## 2024-03-21 DIAGNOSIS — K44.9 HIATAL HERNIA: Primary | ICD-10-CM

## 2024-03-21 PROCEDURE — 1159F MED LIST DOCD IN RCRD: CPT | Mod: CPTII,,,

## 2024-03-21 PROCEDURE — 3008F BODY MASS INDEX DOCD: CPT | Mod: CPTII,,,

## 2024-03-21 PROCEDURE — 99214 OFFICE O/P EST MOD 30 MIN: CPT | Mod: S$PBB,,,

## 2024-03-21 PROCEDURE — 99215 OFFICE O/P EST HI 40 MIN: CPT | Mod: PBBFAC

## 2024-03-21 PROCEDURE — 3078F DIAST BP <80 MM HG: CPT | Mod: CPTII,,,

## 2024-03-21 PROCEDURE — 1160F RVW MEDS BY RX/DR IN RCRD: CPT | Mod: CPTII,,,

## 2024-03-21 PROCEDURE — 3074F SYST BP LT 130 MM HG: CPT | Mod: CPTII,,,

## 2024-03-21 RX ORDER — POLYETHYLENE GLYCOL 3350, SODIUM SULFATE ANHYDROUS, SODIUM BICARBONATE, SODIUM CHLORIDE, POTASSIUM CHLORIDE 236; 22.74; 6.74; 5.86; 2.97 G/4L; G/4L; G/4L; G/4L; G/4L
4 POWDER, FOR SOLUTION ORAL ONCE
Qty: 4000 ML | Refills: 0 | Status: SHIPPED | OUTPATIENT
Start: 2024-03-21 | End: 2024-03-21 | Stop reason: CLARIF

## 2024-03-21 RX ORDER — OMEPRAZOLE 40 MG/1
40 CAPSULE, DELAYED RELEASE ORAL DAILY
Qty: 90 CAPSULE | Refills: 3 | Status: SHIPPED | OUTPATIENT
Start: 2024-03-21 | End: 2025-03-21

## 2024-03-21 NOTE — PROGRESS NOTES
Gastroenterology Clinic Note    Patient ID: [id number]   Referring MD: Valentin Lopez DO   Chief Complaint:   Chief Complaint   Patient presents with    Establish Care     Recent scans and MRI show gallstones and hernia        History of Present Illness   Tammy Behles is an 64 y.o. WF who is referred for hiatal hernia. She complains of upper abdominal pain and fullness. She experiences bloating with meals and increased symptoms of acid reflux. Postprandial pain with meals. Taking Pepcid AC daily without relief. She has been evaluated by Dr. Lopez for cholelithiasis and hiatal hernia with plans for a robot assisted hiatal hernia repair with full or partial fundoplication, possible repair of Bochdalek hernia . Patient does have a past medical history of diverticulitis requiring sigmoid resection in , along with a diverting ileostomy and eventual reversal; last colonoscopy was 10 years prior to that in ; she has not had a colonoscopy since then or around the time of her reversal. She has irregular bowel movements. Denies any hematochezia or melena. Negative Cologuard one year ago.    Previous workup:CT Abdomen w/ Triphasic; US Abdomen limited     Last colonoscopy was     Review of Systems   Constitutional:  Negative for weight loss.   Gastrointestinal:  Positive for abdominal pain, constipation, diarrhea, heartburn and nausea. Negative for blood in stool, melena and vomiting.       Past Medical History      Past Medical History:   Diagnosis Date    Depression     Neuropathy     Osteoporosis        Past Surgical History     Past Surgical History:   Procedure Laterality Date     SECTION      x2    ILEOSTOMY      LIVER SURGERY         Allergies     Review of patient's allergies indicates:   Allergen Reactions    Keflex [cephalexin] Itching and Rash       Immunization History     Immunization History   Administered Date(s) Administered    Influenza - Quadrivalent - PF *Preferred* (6 months and older)  09/20/2022    Pneumococcal Conjugate - 13 Valent 09/20/2022    Pneumococcal Conjugate - 20 Valent 02/22/2023       Past Family History      Family History   Problem Relation Age of Onset    Cancer Mother     Diverticulitis Mother     Hypertension Father     Cancer Father     Cancer Sister     Ulcerative colitis Brother        Past Social History      Social History     Socioeconomic History    Marital status: Single   Tobacco Use    Smoking status: Every Day     Current packs/day: 0.50     Average packs/day: 0.5 packs/day for 40.0 years (20.0 ttl pk-yrs)     Types: Cigarettes    Smokeless tobacco: Never    Tobacco comments:     Quit during years of children. Off & on since early 20s   Substance and Sexual Activity    Alcohol use: Not Currently     Comment: Quit 1 year ago    Drug use: Yes     Types: Marijuana    Sexual activity: Yes     Partners: Male     Social Determinants of Health     Financial Resource Strain: Medium Risk (10/31/2023)    Overall Financial Resource Strain (CARDIA)     Difficulty of Paying Living Expenses: Somewhat hard   Food Insecurity: Food Insecurity Present (10/31/2023)    Hunger Vital Sign     Worried About Running Out of Food in the Last Year: Sometimes true     Ran Out of Food in the Last Year: Never true   Transportation Needs: No Transportation Needs (10/31/2023)    PRAPARE - Transportation     Lack of Transportation (Medical): No     Lack of Transportation (Non-Medical): No   Physical Activity: Insufficiently Active (10/31/2023)    Exercise Vital Sign     Days of Exercise per Week: 2 days     Minutes of Exercise per Session: 60 min   Stress: Stress Concern Present (10/31/2023)    [employer] [place] [employer] - Occupational Stress Questionnaire     Feeling of Stress : Very much   Social Connections: Moderately Isolated (10/31/2023)    Social Connection and Isolation Panel [NHANES]     Frequency of Communication with Friends and Family: More than three times a week      Frequency of Social Gatherings with Friends and Family: Never     Attends Anabaptist Services: Never     Active Member of Clubs or Organizations: No     Attends Club or Organization Meetings: Never     Marital Status: Living with partner   Housing Stability: Low Risk  (10/31/2023)    Housing Stability Vital Sign     Unable to Pay for Housing in the Last Year: No     Number of Places Lived in the Last Year: 1     Unstable Housing in the Last Year: No       Current Medications     Outpatient Medications Marked as Taking for the 3/21/24 encounter (Office Visit) with Mar Londono FNP   Medication Sig Dispense Refill    albuterol (PROVENTIL) 2.5 mg /3 mL (0.083 %) nebulizer solution TAKE 3 MLS (2.5 MG TOTAL) BY NEBULIZATION EVERY 6 (SIX) HOURS AS NEEDED FOR WHEEZING OR SHORTNESS OF BREATH. RESCUE 90 mL 0    albuterol (PROVENTIL) 2.5 mg /3 mL (0.083 %) nebulizer solution Take 3 mLs (2.5 mg total) by nebulization every 6 (six) hours as needed for Wheezing or Shortness of Breath. Rescue 75 mL 0    alendronate (FOSAMAX) 70 MG tablet Take 1 tablet (70 mg total) by mouth every 7 days. 12 tablet 3    diclofenac (VOLTAREN) 50 MG EC tablet Take 1 tablet (50 mg total) by mouth 2 (two) times daily. 60 tablet 1    ergocalciferol (ERGOCALCIFEROL) 50,000 unit Cap Take 1 capsule (50,000 Units total) by mouth every 7 days. 15 capsule 1    EScitalopram oxalate (LEXAPRO) 20 MG tablet Take 1 tablet (20 mg total) by mouth once daily. 90 tablet 0    gabapentin (NEURONTIN) 100 MG capsule Take 2 capsules (200 mg total) by mouth 3 (three) times daily. 180 capsule 2    losartan (COZAAR) 25 MG tablet Take 1 tablet (25 mg total) by mouth once daily. 90 tablet 3    meclizine (ANTIVERT) 25 mg tablet Take 1 tablet (25 mg total) by mouth 3 (three) times daily as needed for Dizziness. 270 tablet 3    methylPREDNISolone (MEDROL DOSEPACK) 4 mg tablet use as directed 21 tablet 0    methylPREDNISolone (MEDROL DOSEPACK) 4 mg tablet use as directed 21  "tablet 0    pregabalin (LYRICA) 75 MG capsule Take 1 capsule (75 mg total) by mouth 2 (two) times daily. 180 capsule 3        I have reviewed the current medications, allergies, vital signs, past medical and surgical history, family medical history, and social history for this encounter and agree with all findings.    OBJECTIVE    Physical Exam    BP (!) 119/41   Pulse 67   Ht 5' 6" (1.676 m)   Wt 59 kg (130 lb)   BMI 20.98 kg/m²   GEN: Well appearing, cooperative, NAD  NECK: Supple, no LAD  CV: Normal rate  RESP: Unlabored  ABD: ND, NT, soft, no guarding  EXT: No clubbing, cyanosis, or edema  SKIN: Warm and dry  NEURO: AAO x4.     LABS    CBC (with or without Differential):   Lab Results   Component Value Date    WBC 9.21 03/21/2024    HGB 14.0 03/21/2024    HCT 41.3 03/21/2024    MCV 88.6 03/21/2024    MCH 30.0 03/21/2024    MCHC 33.9 03/21/2024    RDW 12.6 03/21/2024     03/21/2024    MPV 11.1 03/21/2024    NEUTOPHILPCT 59.4 03/21/2024    DIFFTYPE Auto 03/21/2024     BMP/CMP:   Lab Results   Component Value Date     (L) 03/21/2024    K 3.9 03/21/2024    CL 98 03/21/2024    CO2 30 03/21/2024    BUN 4 (L) 03/21/2024    CREATININE 0.54 (L) 03/21/2024    GLU 83 03/21/2024    CALCIUM 8.7 03/21/2024    ALBUMIN 3.5 03/21/2024    AST 12 (L) 03/21/2024    ALT 12 (L) 03/21/2024    ALKPHOS 75 03/21/2024        IMAGING  CT Abdomen w/o Contrast Plus Triphasic w/Contrast  Order: [id number]  Status: Final result       Visible to patient: Yes (not seen)       Next appt: 03/25/2024 at 01:00 PM in Internal Medicine (Ernesto Sullivan DO)       Dx: Liver mass; Abdominal pain, unspecifi...    1 Result Note       1 Follow-up Encounter  Details    Reading Physician Reading Date Result Priority   Thaggard, Jonathan S, MD  [phone] 12/19/2023 Routine     Narrative & Impression  EXAMINATION:  CT ABDOMEN AND PELVIS with and without contrast     CLINICAL HISTORY:  Hepatomegaly.  Abdominal pain, acute nonlocalized.  " Liver mass     TECHNIQUE:  Axial CT images were obtained through the abdomen and pelvis before and after the IV administration of 100 mL of Isovue 370 contrast. Oral contrast was not given.  Coronal and sagittal reconstructions submitted and interpreted.  Total DLP 1281 mGycm.  Automated exposure control utilized.     COMPARISON:  November 21, 2023 CT of the chest     FINDINGS:  CT abdomen:     Partially visualized lung bases are clear.  There is no gross pleural or pericardial effusion.     There is a fat containing Bochdalek's hernia on the left.     There are scattered benign-appearing water density hepatic cysts throughout the liver, measuring 14 mm and smaller.  No enhancing solid suspicious mass is identified.  Liver, bile ducts, spleen, pancreas, and kidneys are unremarkable.  There is diffuse plumpness of the adrenal glands as on the CT chest from November 21, 2023.     There is layering cholelithiasis.     There is no aneurysm of the moderately calcified abdominal aorta.     There is a small hiatal hernia.  Stomach is not well evaluated because of lack of distention but is grossly unremarkable.  There is no shawn bowel obstruction.  Surgical staples are noted in the distal colon.     CT pelvis:     Urinary bladder is mildly distended.  There is no soft tissue mass in the pelvis.     Mild-to-moderate compression fractures at T12 through L2 are suspected to be more chronic in nature.  These are unchanged from the CT chest November 21, 2023.     Impression:     No acute process     Benign appearing hepatic cysts        Electronically signed by: Jonathan Altamirano  Date:                                            12/19/2023  Time:                                           11:56       ASSESSMENT  Tammy Behles is a 64 y.o. WF with history of HTN, osteoporosis and GERD who is referred for hiatal hernia.    1. Hiatal hernia    2. Epigastric pain    3. Screening for colon cancer    4. Gastroesophageal reflux disease,  unspecified whether esophagitis present           PLAN    - Schedule EGD for GERD, epigastric pain, hiatal hernia   - Prilosec 40 mg daily for GERD  - Patient declined colonoscopy today   Patient Instructions   - I recommend starting a daily fiber supplement with Citrucel or Fibercon (can purchase at your local pharmacy)    - Over the counter Pepcid 20 mg up to 3x daily as needed for breakthrough symptoms of acid reflux.        Orders Placed This Encounter   Procedures    CBC Auto Differential     Standing Status:   Future     Number of Occurrences:   1     Standing Expiration Date:   5/20/2025    Comprehensive Metabolic Panel     Standing Status:   Future     Number of Occurrences:   1     Standing Expiration Date:   5/20/2025    Ferritin     Standing Status:   Future     Number of Occurrences:   1     Standing Expiration Date:   5/21/2025    Iron and TIBC     Standing Status:   Future     Number of Occurrences:   1     Standing Expiration Date:   5/21/2025         The risks and benefits of my recommendations, as well as other treatment options were discussed with the patient today. All questions were answered.    45 minutes of total time spent on the encounter, which includes face to face time and non-face to face time preparing to see the patient (eg, review of tests), obtaining and/or reviewing separately obtained history, documenting clinical information in the electronic or other health record, Independently interpreting results (not separately reported) and communicating results to the patient/family/caregiver, or care coordination (not separately reported).        KAPIL Lutz/ACNP [facility]

## 2024-03-21 NOTE — PATIENT INSTRUCTIONS
- I recommend starting a daily fiber supplement with Citrucel or Fibercon (can purchase at your local pharmacy)    - Over the counter Pepcid 20 mg up to 3x daily as needed for breakthrough symptoms of acid reflux.

## 2024-03-28 ENCOUNTER — ANESTHESIA EVENT (OUTPATIENT)
Dept: GASTROENTEROLOGY | Facility: HOSPITAL | Age: 64
End: 2024-03-28
Payer: MEDICARE

## 2024-03-28 ENCOUNTER — ANESTHESIA (OUTPATIENT)
Dept: GASTROENTEROLOGY | Facility: HOSPITAL | Age: 64
End: 2024-03-28
Payer: MEDICARE

## 2024-03-28 ENCOUNTER — HOSPITAL ENCOUNTER (OUTPATIENT)
Dept: GASTROENTEROLOGY | Facility: HOSPITAL | Age: 64
Discharge: HOME | End: 2024-03-28
Admitting: INTERNAL MEDICINE
Payer: MEDICARE

## 2024-03-28 VITALS
RESPIRATION RATE: 19 BRPM | HEART RATE: 76 BPM | TEMPERATURE: 97 F | SYSTOLIC BLOOD PRESSURE: 116 MMHG | DIASTOLIC BLOOD PRESSURE: 68 MMHG | OXYGEN SATURATION: 93 %

## 2024-03-28 DIAGNOSIS — K44.9 HIATAL HERNIA: ICD-10-CM

## 2024-03-28 DIAGNOSIS — R10.13 EPIGASTRIC PAIN: ICD-10-CM

## 2024-03-28 PROCEDURE — 43239 EGD BIOPSY SINGLE/MULTIPLE: CPT | Mod: ,,, | Performed by: INTERNAL MEDICINE

## 2024-03-28 PROCEDURE — 88342 IMHCHEM/IMCYTCHM 1ST ANTB: CPT | Mod: 26,,, | Performed by: PATHOLOGY

## 2024-03-28 PROCEDURE — 43239 EGD BIOPSY SINGLE/MULTIPLE: CPT | Performed by: INTERNAL MEDICINE

## 2024-03-28 PROCEDURE — D9220A PRA ANESTHESIA: Mod: ,,, | Performed by: NURSE ANESTHETIST, CERTIFIED REGISTERED

## 2024-03-28 PROCEDURE — 27201423 OPTIME MED/SURG SUP & DEVICES STERILE SUPPLY

## 2024-03-28 PROCEDURE — 88305 TISSUE EXAM BY PATHOLOGIST: CPT | Mod: TC,SUR | Performed by: INTERNAL MEDICINE

## 2024-03-28 PROCEDURE — 37000009 HC ANESTHESIA EA ADD 15 MINS

## 2024-03-28 PROCEDURE — 37000008 HC ANESTHESIA 1ST 15 MINUTES

## 2024-03-28 PROCEDURE — 88305 TISSUE EXAM BY PATHOLOGIST: CPT | Mod: 26,,, | Performed by: PATHOLOGY

## 2024-03-28 PROCEDURE — 25000003 PHARM REV CODE 250: Performed by: NURSE ANESTHETIST, CERTIFIED REGISTERED

## 2024-03-28 RX ORDER — SODIUM CHLORIDE 0.9 % (FLUSH) 0.9 %
10 SYRINGE (ML) INJECTION
Status: DISCONTINUED | OUTPATIENT
Start: 2024-03-28 | End: 2024-03-29 | Stop reason: HOSPADM

## 2024-03-28 RX ORDER — LIDOCAINE HYDROCHLORIDE 20 MG/ML
INJECTION, SOLUTION EPIDURAL; INFILTRATION; INTRACAUDAL; PERINEURAL
Status: DISCONTINUED | OUTPATIENT
Start: 2024-03-28 | End: 2024-03-28

## 2024-03-28 RX ORDER — PROPOFOL 10 MG/ML
VIAL (ML) INTRAVENOUS
Status: DISCONTINUED | OUTPATIENT
Start: 2024-03-28 | End: 2024-03-28

## 2024-03-28 RX ADMIN — Medication 50 MG: at 02:03

## 2024-03-28 RX ADMIN — LIDOCAINE HYDROCHLORIDE 80 MG: 20 INJECTION, SOLUTION INTRAVENOUS at 02:03

## 2024-03-28 RX ADMIN — SODIUM CHLORIDE: 9 INJECTION, SOLUTION INTRAVENOUS at 02:03

## 2024-03-28 RX ADMIN — Medication 100 MG: at 02:03

## 2024-03-28 NOTE — TRANSFER OF CARE
Anesthesia Transfer of Care Note    Patient: Tammy Behles    Procedure(s) Performed: * No procedures listed *    Patient location: GI    Anesthesia Type: general    Transport from OR: Transported from OR on room air with adequate spontaneous ventilation    Post pain: adequate analgesia    Post assessment: no apparent anesthetic complications    Post vital signs: stable    Level of consciousness: sedated and responds to stimulation    Nausea/Vomiting: no nausea/vomiting    Complications: none    Transfer of care protocol was followedComments: Good SV continue, NAD noted, VSS, RTRN      Last vitals: Visit Vitals  /82 (BP Location: Right arm, Patient Position: Lying)   Pulse 105   Temp 36.1 °C (97 °F) (Oral)   Resp (!) 30   SpO2 98%   Breastfeeding No

## 2024-03-28 NOTE — DISCHARGE INSTRUCTIONS
Procedure Date  3/28/24     Impression  Overall Impression:   Grade A esophagitis in the lower third of the esophagus and GE junction; performed cold forceps biopsy  Mild abnormal mucosa, consistent with gastritis in the antrum; performed cold forceps biopsies  The upper third of the esophagus, middle third of the esophagus, lower third of the esophagus, cardia, fundus of the stomach, body of the stomach, incisura, duodenal bulb, 1st part of the duodenum and 2nd part of the duodenum appeared normal. Performed random biopsy to rule out celiac disease.        Recommendation    Await pathology results     Continue current medications  Follow up in GI clinic PRN      Outcome of procedure: successful EGD  Disposition: patient to recovery following procedure; discharge to home when appropriate parameters met  Provisions for follow up: please call my office for any unexpected symptoms like chest or abdominal pain or bleeding following your procedure.  Final Diagnosis: gastritis         Indication  63 yo WF with epigastric pain and intermittent diarrhea here for EGD. Denies dysphagia today.

## 2024-03-28 NOTE — ANESTHESIA PREPROCEDURE EVALUATION
03/28/2024  Tammy Behles is a 64 y.o., female.      Pre-op Assessment    I have reviewed the Patient Summary Reports.     I have reviewed the Nursing Notes. I have reviewed the NPO Status.   I have reviewed the Medications.     Review of Systems  Anesthesia Hx:  No problems with previous Anesthesia   History of prior surgery of interest to airway management or planning:          Denies Family Hx of Anesthesia complications.    Denies Personal Hx of Anesthesia complications.                    Cardiovascular:     Hypertension                                  Hypertension         Pulmonary:   COPD         Chronic Obstructive Pulmonary Disease (COPD):                      Neurological:      Headaches      Dx of Headaches                           Psych:  Psychiatric History                Active Ambulatory Problems     Diagnosis Date Noted    Other long term (current) drug therapy 09/21/2022    Skin lesion 09/21/2022    Elevated blood pressure reading 09/21/2022    Screening for viral disease 09/21/2022    Hyperglycemia 09/21/2022    Osteoporosis     Depression     Abscess of sigmoid colon     S/P closure of ileostomy     Infection following a procedure, superficial incisional surgical site, initial encounter 09/29/2022    Encounter for removal of sutures 09/29/2022    Mass of right side of neck 10/13/2022    Screening for malignant neoplasm of cervix 10/13/2022    BMI 22.0-22.9, adult 10/19/2022    History of falling 10/19/2022    Other reduced mobility 10/19/2022    Hypertension 10/19/2022    Acute bilateral low back pain without sciatica 11/30/2022    Fall 11/30/2022    Right hip pain 11/30/2022    Frequent falls 05/02/2023    Intractable headache 05/02/2023    Nicotine dependence, cigarettes, uncomplicated 05/02/2023    Dizzy spells 05/02/2023    Anesthesia of skin 05/02/2023    Other amnesia 05/02/2023     Tobacco use 05/02/2023    Aortic atherosclerosis 07/17/2023    Other emphysema 07/17/2023    Viral gastroenteritis 07/17/2023    Diarrhea 07/17/2023    Abdominal pain 07/17/2023    Tendonitis of ankle or foot 09/12/2023    Foot drop, bilateral 09/12/2023    Bronchitis 10/18/2023    Hypoxia 10/18/2023    Wheezing 10/18/2023    Sore throat 10/18/2023    Cough 10/18/2023    Abnormality of gait and mobility 11/01/2023     Resolved Ambulatory Problems     Diagnosis Date Noted    Encounter for subsequent annual wellness visit (AWV) in Medicare patient 09/21/2022     Past Medical History:   Diagnosis Date    Neuropathy       Review of patient's allergies indicates:   Allergen Reactions    Keflex [cephalexin] Itching and Rash      Current Outpatient Medications on File Prior to Encounter   Medication Sig Dispense Refill    albuterol (PROVENTIL) 2.5 mg /3 mL (0.083 %) nebulizer solution TAKE 3 MLS (2.5 MG TOTAL) BY NEBULIZATION EVERY 6 (SIX) HOURS AS NEEDED FOR WHEEZING OR SHORTNESS OF BREATH. RESCUE 90 mL 0    albuterol (PROVENTIL) 2.5 mg /3 mL (0.083 %) nebulizer solution Take 3 mLs (2.5 mg total) by nebulization every 6 (six) hours as needed for Wheezing or Shortness of Breath. Rescue 75 mL 0    alendronate (FOSAMAX) 70 MG tablet Take 1 tablet (70 mg total) by mouth every 7 days. 12 tablet 3    azithromycin (Z-LORI) 250 MG tablet Take 2 tablets by mouth on day 1; Take 1 tablet by mouth on days 2-5 (Patient not taking: Reported on 3/21/2024) 6 tablet 0    buPROPion (WELLBUTRIN XL) 150 MG TB24 tablet Take 1 tablet (150 mg total) by mouth once daily. 90 tablet 3    diclofenac (VOLTAREN) 50 MG EC tablet Take 1 tablet (50 mg total) by mouth 2 (two) times daily. 60 tablet 1    ergocalciferol (ERGOCALCIFEROL) 50,000 unit Cap Take 1 capsule (50,000 Units total) by mouth every 7 days. 15 capsule 1    EScitalopram oxalate (LEXAPRO) 20 MG tablet Take 1 tablet (20 mg total) by mouth once daily. 90 tablet 0    gabapentin (NEURONTIN)  100 MG capsule Take 2 capsules (200 mg total) by mouth 3 (three) times daily. 180 capsule 2    losartan (COZAAR) 25 MG tablet Take 1 tablet (25 mg total) by mouth once daily. 90 tablet 3    meclizine (ANTIVERT) 25 mg tablet Take 1 tablet (25 mg total) by mouth 3 (three) times daily as needed for Dizziness. 270 tablet 3    methylPREDNISolone (MEDROL DOSEPACK) 4 mg tablet use as directed 21 tablet 0    methylPREDNISolone (MEDROL DOSEPACK) 4 mg tablet use as directed 21 tablet 0    omeprazole (PRILOSEC) 40 MG capsule Take 1 capsule (40 mg total) by mouth once daily. 90 capsule 3    pregabalin (LYRICA) 75 MG capsule Take 1 capsule (75 mg total) by mouth 2 (two) times daily. 180 capsule 3     No current facility-administered medications on file prior to encounter.        Physical Exam  General: Well nourished    Airway:  Mallampati: II   Mouth Opening: Normal  TM Distance: Normal, at least 6 cm  Tongue: Normal  Neck ROM: Normal ROM        Anesthesia Plan  Type of Anesthesia, risks & benefits discussed:    Anesthesia Type: Gen Natural Airway  Intra-op Monitoring Plan: Standard ASA Monitors  Post Op Pain Control Plan: multimodal analgesia  Induction:  IV  Informed Consent: Informed consent signed with the Patient and all parties understand the risks and agree with anesthesia plan.  All questions answered. Patient consented to blood products? No  ASA Score: 3  Day of Surgery Review of History & Physical: H&P Update referred to the surgeon/provider.I have interviewed and examined the patient. I have reviewed the patient's H&P dated: There are no significant changes.     Ready For Surgery From Anesthesia Perspective.     .

## 2024-03-28 NOTE — H&P
Gastroenterology Pre-procedure H&P    History of Present Illness    Tammy Behles is a 64 y.o. female that  has a past medical history of Depression, Neuropathy, and Osteoporosis.     Patient with GERD, bloating, and epigastric pain here for EGD. Denies dysphagia.       Past Medical History:   Diagnosis Date    Depression     Neuropathy     Osteoporosis        Past Surgical History:   Procedure Laterality Date     SECTION      x2    ILEOSTOMY      LIVER SURGERY         Family History   Problem Relation Age of Onset    Cancer Mother     Diverticulitis Mother     Hypertension Father     Cancer Father     Cancer Sister     Ulcerative colitis Brother        Social History     Socioeconomic History    Marital status: Single   Tobacco Use    Smoking status: Every Day     Current packs/day: 0.50     Average packs/day: 0.5 packs/day for 40.0 years (20.0 ttl pk-yrs)     Types: Cigarettes    Smokeless tobacco: Never    Tobacco comments:     Quit during years of children. Off & on since early 20s   Substance and Sexual Activity    Alcohol use: Not Currently     Comment: Quit 1 year ago    Drug use: Yes     Types: Marijuana    Sexual activity: Yes     Partners: Male     Social Determinants of Health     Financial Resource Strain: Medium Risk (10/31/2023)    Overall Financial Resource Strain (CARDIA)     Difficulty of Paying Living Expenses: Somewhat hard   Food Insecurity: Food Insecurity Present (10/31/2023)    Hunger Vital Sign     Worried About Running Out of Food in the Last Year: Sometimes true     Ran Out of Food in the Last Year: Never true   Transportation Needs: No Transportation Needs (10/31/2023)    PRAPARE - Transportation     Lack of Transportation (Medical): No     Lack of Transportation (Non-Medical): No   Physical Activity: Insufficiently Active (10/31/2023)    Exercise Vital Sign     Days of Exercise per Week: 2 days     Minutes of Exercise per Session: 60 min   Stress: Stress Concern Present  (10/31/2023)    [place] [employer] - Occupational Stress Questionnaire     Feeling of Stress : Very much   Social Connections: Moderately Isolated (10/31/2023)    Social Connection and Isolation Panel [NHANES]     Frequency of Communication with Friends and Family: More than three times a week     Frequency of Social Gatherings with Friends and Family: Never     Attends [employer] Services: Never     Active Member of Clubs or Organizations: No     Attends Club or Organization Meetings: Never     Marital Status: Living with partner   Housing Stability: Low Risk  (10/31/2023)    Housing Stability Vital Sign     Unable to Pay for Housing in the Last Year: No     Number of Places Lived in the Last Year: 1     Unstable Housing in the Last Year: No       Current Outpatient Medications   Medication Sig Dispense Refill    albuterol (PROVENTIL) 2.5 mg /3 mL (0.083 %) nebulizer solution TAKE 3 MLS (2.5 MG TOTAL) BY NEBULIZATION EVERY 6 (SIX) HOURS AS NEEDED FOR WHEEZING OR SHORTNESS OF BREATH. RESCUE 90 mL 0    albuterol (PROVENTIL) 2.5 mg /3 mL (0.083 %) nebulizer solution Take 3 mLs (2.5 mg total) by nebulization every 6 (six) hours as needed for Wheezing or Shortness of Breath. Rescue 75 mL 0    alendronate (FOSAMAX) 70 MG tablet Take 1 tablet (70 mg total) by mouth every 7 days. 12 tablet 3    azithromycin (Z-LORI) 250 MG tablet Take 2 tablets by mouth on day 1; Take 1 tablet by mouth on days 2-5 (Patient not taking: Reported on 3/21/2024) 6 tablet 0    buPROPion (WELLBUTRIN XL) 150 MG TB24 tablet Take 1 tablet (150 mg total) by mouth once daily. 90 tablet 3    diclofenac (VOLTAREN) 50 MG EC tablet Take 1 tablet (50 mg total) by mouth 2 (two) times daily. 60 tablet 1    ergocalciferol (ERGOCALCIFEROL) 50,000 unit Cap Take 1 capsule (50,000 Units total) by mouth every 7 days. 15 capsule 1    EScitalopram oxalate (LEXAPRO) 20 MG tablet Take 1 tablet (20 mg total) by mouth once daily. 90 tablet 0     gabapentin (NEURONTIN) 100 MG capsule Take 2 capsules (200 mg total) by mouth 3 (three) times daily. 180 capsule 2    losartan (COZAAR) 25 MG tablet Take 1 tablet (25 mg total) by mouth once daily. 90 tablet 3    meclizine (ANTIVERT) 25 mg tablet Take 1 tablet (25 mg total) by mouth 3 (three) times daily as needed for Dizziness. 270 tablet 3    methylPREDNISolone (MEDROL DOSEPACK) 4 mg tablet use as directed 21 tablet 0    methylPREDNISolone (MEDROL DOSEPACK) 4 mg tablet use as directed 21 tablet 0    omeprazole (PRILOSEC) 40 MG capsule Take 1 capsule (40 mg total) by mouth once daily. 90 capsule 3    pregabalin (LYRICA) 75 MG capsule Take 1 capsule (75 mg total) by mouth 2 (two) times daily. 180 capsule 3     No current facility-administered medications for this encounter.       Review of patient's allergies indicates:   Allergen Reactions    Keflex [cephalexin] Itching and Rash       Objective:  Vitals:    03/28/24 1210   BP: 114/64   Pulse: 79   Resp: (!) 9   SpO2: (!) 94%        GEN: normal appearing, NAD, AAO x3  HENT: NCAT, anicteric, OP benign  CV: normal rate, regular rhythm  RESP: CTA, symmetric rise, unlabored  ABD: soft, ND, no guarding or TTP  SKIN: warm and dry  NEURO: grossly afocal    Assessment and Plan:    Proceed with:    EGD for epigastric pain       Ronal Landin MD  Gastroenterology

## 2024-03-28 NOTE — ANESTHESIA POSTPROCEDURE EVALUATION
Anesthesia Post Evaluation    Patient: Tammy Behles    Procedure(s) Performed: * No procedures listed *    Final Anesthesia Type: general      Patient location during evaluation: GI PACU ([facility])  Patient participation: Yes- Able to Participate  Level of consciousness: awake and alert  Post-procedure vital signs: reviewed and stable  Pain management: adequate  Airway patency: patent    PONV status at discharge: No PONV  Anesthetic complications: no      Cardiovascular status: blood pressure returned to baseline, hemodynamically stable and stable  Respiratory status: unassisted  Hydration status: euvolemic  Follow-up not needed.  Comments: Refer to nursing note for pain/antonia score upon discharge from recovery.               Vitals Value Taken Time   /72 03/28/24 1508   Temp 36.1 °C (97 °F) 03/28/24 1430   Pulse 63 03/28/24 1510   Resp 29 03/28/24 1510   SpO2 94 % 03/28/24 1509   Vitals shown include unvalidated device data.      Event Time   Out of Recovery 15:17:26         Pain/Antonia Score: Antonia Score: 10 (3/28/2024  2:49 PM)

## 2024-04-01 LAB
DHEA SERPL-MCNC: NORMAL
ESTROGEN SERPL-MCNC: NORMAL PG/ML
INSULIN SERPL-ACNC: NORMAL U[IU]/ML
LAB AP GROSS DESCRIPTION: NORMAL
LAB AP LABORATORY NOTES: NORMAL
T3RU NFR SERPL: NORMAL %

## 2024-04-01 RX ORDER — GABAPENTIN 100 MG/1
200 CAPSULE ORAL 3 TIMES DAILY
Qty: 180 CAPSULE | Refills: 2 | Status: SHIPPED | OUTPATIENT
Start: 2024-04-01 | End: 2025-04-01

## 2024-04-08 ENCOUNTER — TELEPHONE (OUTPATIENT)
Dept: GASTROENTEROLOGY | Facility: CLINIC | Age: 64
End: 2024-04-08
Payer: MEDICARE

## 2024-04-22 ENCOUNTER — OFFICE VISIT (OUTPATIENT)
Dept: FAMILY MEDICINE | Facility: CLINIC | Age: 64
End: 2024-04-22
Payer: MEDICARE

## 2024-04-22 VITALS
WEIGHT: 131.38 LBS | HEART RATE: 90 BPM | HEIGHT: 66 IN | DIASTOLIC BLOOD PRESSURE: 70 MMHG | TEMPERATURE: 98 F | SYSTOLIC BLOOD PRESSURE: 126 MMHG | RESPIRATION RATE: 18 BRPM | BODY MASS INDEX: 21.11 KG/M2 | OXYGEN SATURATION: 95 %

## 2024-04-22 DIAGNOSIS — Z13.6 ENCOUNTER FOR SCREENING FOR CARDIOVASCULAR DISORDERS: ICD-10-CM

## 2024-04-22 DIAGNOSIS — Z12.31 SCREENING MAMMOGRAM FOR BREAST CANCER: ICD-10-CM

## 2024-04-22 DIAGNOSIS — I10 HYPERTENSION, UNSPECIFIED TYPE: ICD-10-CM

## 2024-04-22 DIAGNOSIS — K80.10 CALCULUS OF GALLBLADDER WITH CHRONIC CHOLECYSTITIS WITHOUT OBSTRUCTION: ICD-10-CM

## 2024-04-22 DIAGNOSIS — M81.0 OSTEOPOROSIS, UNSPECIFIED OSTEOPOROSIS TYPE, UNSPECIFIED PATHOLOGICAL FRACTURE PRESENCE: ICD-10-CM

## 2024-04-22 DIAGNOSIS — I70.0 AORTIC ATHEROSCLEROSIS: ICD-10-CM

## 2024-04-22 DIAGNOSIS — K44.9 HIATAL HERNIA: ICD-10-CM

## 2024-04-22 DIAGNOSIS — Z01.812 PRE-PROCEDURE LAB EXAM: ICD-10-CM

## 2024-04-22 DIAGNOSIS — F17.210 NICOTINE DEPENDENCE, CIGARETTES, UNCOMPLICATED: ICD-10-CM

## 2024-04-22 DIAGNOSIS — R73.9 HYPERGLYCEMIA: ICD-10-CM

## 2024-04-22 DIAGNOSIS — Z79.899 OTHER LONG TERM (CURRENT) DRUG THERAPY: ICD-10-CM

## 2024-04-22 DIAGNOSIS — R10.9 ABDOMINAL PAIN, UNSPECIFIED ABDOMINAL LOCATION: ICD-10-CM

## 2024-04-22 DIAGNOSIS — R16.0 HEPATOMEGALY, NOT ELSEWHERE CLASSIFIED: ICD-10-CM

## 2024-04-22 DIAGNOSIS — K80.20 GALL STONES: Primary | ICD-10-CM

## 2024-04-22 LAB
25(OH)D3 SERPL-MCNC: 19.6 NG/ML
ALBUMIN SERPL BCP-MCNC: 3.9 G/DL (ref 3.5–5)
ALBUMIN/GLOB SERPL: 1.3 {RATIO}
ALP SERPL-CCNC: 84 U/L (ref 50–130)
ALT SERPL W P-5'-P-CCNC: 17 U/L (ref 13–56)
AMYLASE SERPL-CCNC: 40 U/L (ref 25–115)
ANION GAP SERPL CALCULATED.3IONS-SCNC: 9 MMOL/L (ref 7–16)
AST SERPL W P-5'-P-CCNC: 12 U/L (ref 15–37)
BASOPHILS # BLD AUTO: 0.07 K/UL (ref 0–0.2)
BASOPHILS NFR BLD AUTO: 0.8 % (ref 0–1)
BILIRUB SERPL-MCNC: 0.5 MG/DL (ref ?–1.2)
BUN SERPL-MCNC: 4 MG/DL (ref 7–18)
BUN/CREAT SERPL: 8 (ref 6–20)
CALCIUM SERPL-MCNC: 9.4 MG/DL (ref 8.5–10.1)
CHLORIDE SERPL-SCNC: 101 MMOL/L (ref 98–107)
CHOLEST SERPL-MCNC: 164 MG/DL (ref 0–200)
CHOLEST/HDLC SERPL: 3.8 {RATIO}
CO2 SERPL-SCNC: 29 MMOL/L (ref 21–32)
CREAT SERPL-MCNC: 0.5 MG/DL (ref 0.55–1.02)
DIFFERENTIAL METHOD BLD: ABNORMAL
EGFR (NO RACE VARIABLE) (RUSH/TITUS): 105 ML/MIN/1.73M2
EOSINOPHIL # BLD AUTO: 0.13 K/UL (ref 0–0.5)
EOSINOPHIL NFR BLD AUTO: 1.6 % (ref 1–4)
ERYTHROCYTE [DISTWIDTH] IN BLOOD BY AUTOMATED COUNT: 13.2 % (ref 11.5–14.5)
FOLATE SERPL-MCNC: 3.4 NG/ML (ref 3.1–17.5)
GLOBULIN SER-MCNC: 3.1 G/DL (ref 2–4)
GLUCOSE SERPL-MCNC: 74 MG/DL (ref 74–106)
HCT VFR BLD AUTO: 45.4 % (ref 38–47)
HDLC SERPL-MCNC: 43 MG/DL (ref 40–60)
HGB BLD-MCNC: 14.9 G/DL (ref 12–16)
IMM GRANULOCYTES # BLD AUTO: 0.02 K/UL (ref 0–0.04)
IMM GRANULOCYTES NFR BLD: 0.2 % (ref 0–0.4)
LDLC SERPL CALC-MCNC: 104 MG/DL
LDLC/HDLC SERPL: 2.4 {RATIO}
LIPASE SERPL-CCNC: 15 U/L (ref 16–77)
LYMPHOCYTES # BLD AUTO: 2.43 K/UL (ref 1–4.8)
LYMPHOCYTES NFR BLD AUTO: 29.2 % (ref 27–41)
MCH RBC QN AUTO: 30.7 PG (ref 27–31)
MCHC RBC AUTO-ENTMCNC: 32.8 G/DL (ref 32–36)
MCV RBC AUTO: 93.6 FL (ref 80–96)
MONOCYTES # BLD AUTO: 0.55 K/UL (ref 0–0.8)
MONOCYTES NFR BLD AUTO: 6.6 % (ref 2–6)
MPC BLD CALC-MCNC: 11.4 FL (ref 9.4–12.4)
NEUTROPHILS # BLD AUTO: 5.12 K/UL (ref 1.8–7.7)
NEUTROPHILS NFR BLD AUTO: 61.6 % (ref 53–65)
NONHDLC SERPL-MCNC: 121 MG/DL
NRBC # BLD AUTO: 0 X10E3/UL
NRBC, AUTO (.00): 0 %
PLATELET # BLD AUTO: 251 K/UL (ref 150–400)
POTASSIUM SERPL-SCNC: 4.1 MMOL/L (ref 3.5–5.1)
PROT SERPL-MCNC: 7 G/DL (ref 6.4–8.2)
RBC # BLD AUTO: 4.85 M/UL (ref 4.2–5.4)
SODIUM SERPL-SCNC: 135 MMOL/L (ref 136–145)
T4 FREE SERPL-MCNC: 0.89 NG/DL (ref 0.76–1.46)
TRIGL SERPL-MCNC: 84 MG/DL (ref 35–150)
TSH SERPL DL<=0.005 MIU/L-ACNC: 0.05 UIU/ML (ref 0.36–3.74)
VIT B12 SERPL-MCNC: 433 PG/ML (ref 193–986)
VLDLC SERPL-MCNC: 17 MG/DL
WBC # BLD AUTO: 8.32 K/UL (ref 4.5–11)

## 2024-04-22 PROCEDURE — 3008F BODY MASS INDEX DOCD: CPT | Mod: CPTII,,, | Performed by: NURSE PRACTITIONER

## 2024-04-22 PROCEDURE — 1159F MED LIST DOCD IN RCRD: CPT | Mod: CPTII,,, | Performed by: NURSE PRACTITIONER

## 2024-04-22 PROCEDURE — 3078F DIAST BP <80 MM HG: CPT | Mod: CPTII,,, | Performed by: NURSE PRACTITIONER

## 2024-04-22 PROCEDURE — 82746 ASSAY OF FOLIC ACID SERUM: CPT | Mod: ,,, | Performed by: CLINICAL MEDICAL LABORATORY

## 2024-04-22 PROCEDURE — 82306 VITAMIN D 25 HYDROXY: CPT | Mod: ,,, | Performed by: CLINICAL MEDICAL LABORATORY

## 2024-04-22 PROCEDURE — 3074F SYST BP LT 130 MM HG: CPT | Mod: CPTII,,, | Performed by: NURSE PRACTITIONER

## 2024-04-22 PROCEDURE — 83036 HEMOGLOBIN GLYCOSYLATED A1C: CPT | Mod: ,,, | Performed by: CLINICAL MEDICAL LABORATORY

## 2024-04-22 PROCEDURE — 84439 ASSAY OF FREE THYROXINE: CPT | Mod: ,,, | Performed by: CLINICAL MEDICAL LABORATORY

## 2024-04-22 PROCEDURE — 80061 LIPID PANEL: CPT | Mod: ,,, | Performed by: CLINICAL MEDICAL LABORATORY

## 2024-04-22 PROCEDURE — 82607 VITAMIN B-12: CPT | Mod: ,,, | Performed by: CLINICAL MEDICAL LABORATORY

## 2024-04-22 PROCEDURE — 82150 ASSAY OF AMYLASE: CPT | Mod: ,,, | Performed by: CLINICAL MEDICAL LABORATORY

## 2024-04-22 PROCEDURE — 99214 OFFICE O/P EST MOD 30 MIN: CPT | Mod: 25,,, | Performed by: NURSE PRACTITIONER

## 2024-04-22 PROCEDURE — 4010F ACE/ARB THERAPY RXD/TAKEN: CPT | Mod: CPTII,,, | Performed by: NURSE PRACTITIONER

## 2024-04-22 PROCEDURE — 96372 THER/PROPH/DIAG INJ SC/IM: CPT | Mod: ,,, | Performed by: NURSE PRACTITIONER

## 2024-04-22 PROCEDURE — 80050 GENERAL HEALTH PANEL: CPT | Mod: ,,, | Performed by: CLINICAL MEDICAL LABORATORY

## 2024-04-22 PROCEDURE — 83690 ASSAY OF LIPASE: CPT | Mod: ,,, | Performed by: CLINICAL MEDICAL LABORATORY

## 2024-04-22 RX ORDER — CYANOCOBALAMIN 1000 UG/ML
1000 INJECTION, SOLUTION INTRAMUSCULAR; SUBCUTANEOUS
Status: COMPLETED | OUTPATIENT
Start: 2024-04-22 | End: 2024-04-22

## 2024-04-22 RX ORDER — LOSARTAN POTASSIUM 25 MG/1
25 TABLET ORAL DAILY
Qty: 90 TABLET | Refills: 3 | Status: SHIPPED | OUTPATIENT
Start: 2024-04-22 | End: 2025-04-22

## 2024-04-22 RX ORDER — ATORVASTATIN CALCIUM 40 MG/1
40 TABLET, FILM COATED ORAL DAILY
Qty: 90 TABLET | Refills: 3 | Status: SHIPPED | OUTPATIENT
Start: 2024-04-22 | End: 2025-04-22

## 2024-04-22 RX ORDER — BUSPIRONE HYDROCHLORIDE 5 MG/1
5 TABLET ORAL 2 TIMES DAILY
Qty: 180 TABLET | Refills: 1 | Status: SHIPPED | OUTPATIENT
Start: 2024-04-22 | End: 2025-04-22

## 2024-04-22 RX ORDER — ESCITALOPRAM OXALATE 20 MG/1
20 TABLET ORAL DAILY
Qty: 90 TABLET | Refills: 3 | Status: SHIPPED | OUTPATIENT
Start: 2024-04-22

## 2024-04-22 RX ORDER — ERGOCALCIFEROL 1.25 MG/1
50000 CAPSULE ORAL
Qty: 15 CAPSULE | Refills: 1 | Status: SHIPPED | OUTPATIENT
Start: 2024-04-22

## 2024-04-22 RX ADMIN — CYANOCOBALAMIN 1000 MCG: 1000 INJECTION, SOLUTION INTRAMUSCULAR; SUBCUTANEOUS at 12:04

## 2024-04-22 NOTE — PROGRESS NOTES
Vanessa Rasmussen DNP   [address] [place] [address]     PATIENT NAME: Tammy Behles  : 1960  DATE: 24  MRN: [id number]      Billing Provider: Vanessa Rasmussen DNP  Level of Service:   Patient PCP Information       Provider PCP Type    Vanessa Rasmussen DNP General            Reason for Visit / Chief Complaint: Follow-up       Update PCP  Update Chief Complaint         History of Present Illness / Problem Focused Workflow     Tammy Behles presents to the clinic with Follow-up     Follow-up    Review of Systems     Review of Systems     Medical / Social / Family History     Past Medical History:   Diagnosis Date    Depression     Neuropathy     Osteoporosis        Past Surgical History:   Procedure Laterality Date     SECTION      x2    ILEOSTOMY      LIVER SURGERY         Social History  Ms.  reports that she has been smoking cigarettes. She has a 20 pack-year smoking history. She has been exposed to tobacco smoke. She has never used smokeless tobacco. She reports that she does not currently use alcohol. She reports current drug use. Drug: Marijuana.    Family History  Ms.'s family history includes Cancer in her father, mother, and sister; Diverticulitis in her mother; Hypertension in her father; Ulcerative colitis in her brother.    Medications and Allergies     Medications  Current Outpatient Medications   Medication Sig Dispense Refill    albuterol (PROVENTIL) 2.5 mg /3 mL (0.083 %) nebulizer solution TAKE 3 MLS (2.5 MG TOTAL) BY NEBULIZATION EVERY 6 (SIX) HOURS AS NEEDED FOR WHEEZING OR SHORTNESS OF BREATH. RESCUE 90 mL 0    gabapentin (NEURONTIN) 100 MG capsule Take 2 capsules (200 mg total) by mouth 3 (three) times daily. 180 capsule 2    omeprazole (PRILOSEC) 40 MG capsule Take 1 capsule (40 mg total) by mouth once daily. 90 capsule 3    atorvastatin (LIPITOR) 40 MG tablet Take 1 tablet (40 mg total) by mouth once daily. 90 tablet 3    busPIRone (BUSPAR) 5  "MG Tab Take 1 tablet (5 mg total) by mouth 2 (two) times daily. 180 tablet 1    denosumab (PROLIA) 60 mg/mL Syrg Inject 1 mL (60 mg total) into the skin every 6 (six) months. 2 mL 1    ergocalciferol (ERGOCALCIFEROL) 50,000 unit Cap Take 1 capsule (50,000 Units total) by mouth every 7 days. 15 capsule 1    EScitalopram oxalate (LEXAPRO) 20 MG tablet Take 1 tablet (20 mg total) by mouth once daily. 90 tablet 3    losartan (COZAAR) 25 MG tablet Take 1 tablet (25 mg total) by mouth once daily. 90 tablet 3     No current facility-administered medications for this visit.       Allergies  Review of patient's allergies indicates:   Allergen Reactions    Keflex [cephalexin] Itching and Rash       Physical Examination   /70 (BP Location: Right arm, Patient Position: Sitting, BP Method: Large (Automatic))   Pulse 90   Temp 98 °F (36.7 °C) (Oral)   Resp 18   Ht 5' 6" (1.676 m)   Wt 59.6 kg (131 lb 6.4 oz)   SpO2 95%   BMI 21.21 kg/m²    Physical Exam  Vitals and nursing note reviewed.   Constitutional:       Appearance: She is ill-appearing.   HENT:      Head: Normocephalic.      Nose: Nose normal.      Mouth/Throat:      Mouth: Mucous membranes are moist.   Eyes:      Extraocular Movements: Extraocular movements intact.      Conjunctiva/sclera: Conjunctivae normal.      Pupils: Pupils are equal, round, and reactive to light.   Cardiovascular:      Rate and Rhythm: Normal rate and regular rhythm.      Pulses: Normal pulses.      Heart sounds: Normal heart sounds.   Pulmonary:      Effort: Pulmonary effort is normal.      Breath sounds: Normal breath sounds.   Abdominal:      General: Bowel sounds are normal. There is no distension.      Palpations: Abdomen is soft. There is no mass.      Tenderness: There is abdominal tenderness.      Hernia: No hernia is present.   Musculoskeletal:      Cervical back: Normal range of motion.      Comments: Limps, slow to walk. Uses cane and assistance from partner   Skin:    "  General: Skin is warm and dry.      Capillary Refill: Capillary refill takes less than 2 seconds.   Neurological:      General: No focal deficit present.      Mental Status: She is alert and oriented to person, place, and time. Mental status is at baseline.   Psychiatric:         Mood and Affect: Mood normal.         Behavior: Behavior normal.         Thought Content: Thought content normal.         Judgment: Judgment normal.        Assessment and Plan (including Health Maintenance)      Problem List  Smart Sets  Document Outside HM   :    Plan:     Labs today  Pcv and flu vacc utd        Mmg - 10/2022 due now* scheduled with HIDA scan     Cologuard - 5/2023  Had a colon abscess went to [facility] and was transferred to [facility] and had emergency surgery with ileostomy and then had reversal done nearly a year later.. 2020? Get records     Sent in tdap and [employer] wait 4 weeks to get.   Pap - 10/2022  Smokes 1 ppd x15 years.      Unable to walk without cane and assistance  Pcv20 2/2023  Looks better - no alcohol since October 2022   Lost 10 lbs since October 2023    Seeing GI - hiatal hernia  Surge - refer for gallbladder removal based on previous scans    Needs tdap at pharmacy. Pt aware  Refill meds  Labs today      Health Maintenance Due   Topic Date Due    TETANUS VACCINE  Never done    Shingles Vaccine (1 of 2) Never done    RSV Vaccine (Age 60+ and Pregnant patients) (1 - 1-dose 60+ series) Never done    Mammogram  10/13/2023       Problem List Items Addressed This Visit          Cardiac/Vascular    Hypertension    Relevant Orders    CBC Auto Differential    Comprehensive Metabolic Panel    Aortic atherosclerosis    Relevant Orders    Lipid Panel       Renal/    Screening mammogram for breast cancer    Relevant Orders    Mammo Digital Screening Bilat w/ [facility]       Endocrine    Hyperglycemia    Relevant Orders    Hemoglobin A1C    Osteoporosis    Relevant Medications    denosumab (PROLIA) 60 mg/mL Syrg        GI    Abdominal pain    Relevant Orders    Ambulatory referral/consult to General Surgery    Amylase    Lipase    Hepatomegaly, not elsewhere classified    Gall stones - Primary    Relevant Orders    CBC Auto Differential    Comprehensive Metabolic Panel    Ambulatory referral/consult to General Surgery    Amylase    Lipase    Hiatal hernia    Calculus of gallbladder with chronic cholecystitis without obstruction    Relevant Orders    Ambulatory referral/consult to General Surgery    NM Hepatobiliary Scan (HIDA)       Palliative Care    Other long term (current) drug therapy    Relevant Orders    Vitamin D    Vitamin B12 & Folate    Thyroid Panel       Other    Nicotine dependence, cigarettes, uncomplicated       Health Maintenance Topics with due status: Not Due       Topic Last Completion Date    Cervical Cancer Screening 10/13/2022    Lipid Panel 02/22/2023    Colorectal Cancer Screening 05/07/2023    LDCT Lung Screen 11/21/2023    High Dose Statin 04/22/2024       Future Appointments   Date Time Provider Department Center   4/30/2024 11:00 AM Valentin Lopez DO [facility]   5/3/2024  8:30 AM [facility] NM1 [facility]   8/6/2024 10:00 AM [facility] MAMMO1 [facility]   9/23/2024  2:20 PM Mar Londono, KAPIL [facility]   11/5/2024 11:00 AM [facility] NURSE, [facility] [place]            Signature:  Vanessa Rasmussen, YEYO      [address] [place] [address]    Date of encounter: 4/22/24

## 2024-04-23 LAB
EST. AVERAGE GLUCOSE BLD GHB EST-MCNC: 94 MG/DL
HBA1C MFR BLD HPLC: 4.9 % (ref 4.5–6.6)

## 2024-04-25 RX ORDER — FOLIC ACID 1 MG/1
1 TABLET ORAL DAILY
Qty: 90 TABLET | Refills: 1 | Status: SHIPPED | OUTPATIENT
Start: 2024-04-25 | End: 2025-04-25

## 2024-04-25 NOTE — PROGRESS NOTES
Hydrate well, also vit b12 and d - more concerned that folic acid is low again. Would have her start back those and maybe 2 WOMENS MVI daily - I know she was going to  calcium and d but just wanted to verify. Thyroid a little off compared to previous levels. Repeat 1 month full thyroid panel.

## 2024-04-25 NOTE — TELEPHONE ENCOUNTER
----- Message from Vanessa Rasmussen, DNP sent at 4/25/2024  8:15 AM CDT -----  Hydrate well, also vit b12 and d - more concerned that folic acid is low again. Would have her start back those and maybe 2 WOMENS MVI daily - I know she was going to  calcium and d but just wanted to verify. Thyroid a little off compared to p  revious levels. Repeat 1 month full thyroid panel.

## 2024-04-30 ENCOUNTER — OFFICE VISIT (OUTPATIENT)
Dept: SURGERY | Facility: CLINIC | Age: 64
End: 2024-04-30
Payer: MEDICARE

## 2024-04-30 DIAGNOSIS — K80.10 CALCULUS OF GALLBLADDER WITH CHRONIC CHOLECYSTITIS WITHOUT OBSTRUCTION: ICD-10-CM

## 2024-04-30 DIAGNOSIS — R10.9 ABDOMINAL PAIN, UNSPECIFIED ABDOMINAL LOCATION: ICD-10-CM

## 2024-04-30 DIAGNOSIS — K80.20 GALL STONES: ICD-10-CM

## 2024-04-30 PROCEDURE — 99214 OFFICE O/P EST MOD 30 MIN: CPT | Mod: S$PBB,,, | Performed by: STUDENT IN AN ORGANIZED HEALTH CARE EDUCATION/TRAINING PROGRAM

## 2024-04-30 PROCEDURE — 1159F MED LIST DOCD IN RCRD: CPT | Mod: CPTII,,, | Performed by: STUDENT IN AN ORGANIZED HEALTH CARE EDUCATION/TRAINING PROGRAM

## 2024-04-30 PROCEDURE — 3044F HG A1C LEVEL LT 7.0%: CPT | Mod: CPTII,,, | Performed by: STUDENT IN AN ORGANIZED HEALTH CARE EDUCATION/TRAINING PROGRAM

## 2024-04-30 PROCEDURE — 4010F ACE/ARB THERAPY RXD/TAKEN: CPT | Mod: CPTII,,, | Performed by: STUDENT IN AN ORGANIZED HEALTH CARE EDUCATION/TRAINING PROGRAM

## 2024-04-30 PROCEDURE — 99213 OFFICE O/P EST LOW 20 MIN: CPT | Mod: PBBFAC | Performed by: STUDENT IN AN ORGANIZED HEALTH CARE EDUCATION/TRAINING PROGRAM

## 2024-05-02 ENCOUNTER — TELEPHONE (OUTPATIENT)
Dept: FAMILY MEDICINE | Facility: CLINIC | Age: 64
End: 2024-05-02
Payer: MEDICARE

## 2024-05-02 NOTE — TELEPHONE ENCOUNTER
Called and notified pt Prolia was ordered for bones    Also to contact spine and neurology who mention NC test and see they want to order that testing on her

## 2024-05-02 NOTE — TELEPHONE ENCOUNTER
----- Message from Carlyn Dowd sent at 5/2/2024 10:54 AM CDT -----  Pt  said she got 7 new med want to know which one is for osteoporosis, also have a question about nerve compression test. please call her @ [phone]

## 2024-06-05 ENCOUNTER — TELEPHONE (OUTPATIENT)
Dept: FAMILY MEDICINE | Facility: CLINIC | Age: 64
End: 2024-06-05
Payer: MEDICARE

## 2024-06-05 NOTE — TELEPHONE ENCOUNTER
----- Message from Apple Stevens sent at 6/5/2024  9:50 AM CDT -----  Who Called: Tammy Behles    Caller is requesting assistance/information from provider's office.    Preferred Method of Contact: Phone Call  Patient's Preferred Phone Number on File: [phone]   Best Call Back Number, if different:  Additional Information:  pt f/u to see if insurance approved her shot for osteoporosis

## 2024-06-17 ENCOUNTER — TELEPHONE (OUTPATIENT)
Dept: FAMILY MEDICINE | Facility: CLINIC | Age: 64
End: 2024-06-17
Payer: MEDICARE

## 2024-06-17 NOTE — TELEPHONE ENCOUNTER
----- Message from Saundra Aguilar sent at 6/14/2024 10:37 AM CDT -----  Regarding: RETURN CALL  Who Called: Tammy Behles    Patient is returning phone call    Who Left Message for Patient:  Does the patient know what this is regarding?:NO      Preferred Method of Contact: Phone Call  Patient's Preferred Phone Number on File: [phone]   Best Call Back Number, if different:  Additional Information:

## 2024-06-25 ENCOUNTER — TELEPHONE (OUTPATIENT)
Dept: FAMILY MEDICINE | Facility: CLINIC | Age: 64
End: 2024-06-25
Payer: MEDICARE

## 2024-06-25 DIAGNOSIS — M81.0 OSTEOPOROSIS, UNSPECIFIED OSTEOPOROSIS TYPE, UNSPECIFIED PATHOLOGICAL FRACTURE PRESENCE: Primary | ICD-10-CM

## 2024-06-25 NOTE — TELEPHONE ENCOUNTER
----- Message from Nessa Pineda sent at 6/25/2024  8:42 AM CDT -----  Regarding: PROLIA  Patient stated that she would have to pay around $100 for her Prolia injection from [facility] and around $25 if she qualifies for the financial assistance. She wants Mrs. Rasmussen to put in an order for her to get her Prolia at the hospital to see how much it would be there and compare the two. Patient can be reached at [phone].

## 2024-06-25 NOTE — TELEPHONE ENCOUNTER
Andrew Szymanski pt wants to see if Prolia will be cheaper for her to get at the hospital than here at the clinic   Can you order for infusion center

## 2024-07-08 ENCOUNTER — TELEPHONE (OUTPATIENT)
Dept: FAMILY MEDICINE | Facility: CLINIC | Age: 64
End: 2024-07-08
Payer: MEDICARE

## 2024-07-22 ENCOUNTER — TELEPHONE (OUTPATIENT)
Dept: FAMILY MEDICINE | Facility: CLINIC | Age: 64
End: 2024-07-22
Payer: MEDICARE

## 2024-07-22 DIAGNOSIS — M81.0 OSTEOPOROSIS, UNSPECIFIED OSTEOPOROSIS TYPE, UNSPECIFIED PATHOLOGICAL FRACTURE PRESENCE: Primary | ICD-10-CM

## 2024-07-22 DIAGNOSIS — Z79.899 OTHER LONG TERM (CURRENT) DRUG THERAPY: ICD-10-CM

## 2024-07-22 NOTE — TELEPHONE ENCOUNTER
Attempted to call back   We do have her prolia in the clinic    She will need to come in for her Calcium level before we can do her prolia   Last Ca done was 9.4 on 04/22/2024    Next f/u due in 10/22/2024

## 2024-07-22 NOTE — TELEPHONE ENCOUNTER
----- Message from Saundra Aguilar sent at 7/22/2024 12:50 PM CDT -----  Regarding: PROLIA INJECTION  Who Called: Tammy Behles        Who Left Message for Patient:  Does the patient know what this is regarding?:YES      Preferred Method of Contact: Phone Call  Patient's Preferred Phone Number on File: [phone]   Best Call Back Number, if different:  Additional Information: WOULD LIKE TO SPEAK WITH THE NURSE IN REGARDS TO THE PROLIA INJECTION

## 2024-08-14 ENCOUNTER — OFFICE VISIT (OUTPATIENT)
Dept: FAMILY MEDICINE | Facility: CLINIC | Age: 64
End: 2024-08-14
Payer: MEDICARE

## 2024-08-14 VITALS
BODY MASS INDEX: 21.89 KG/M2 | HEIGHT: 66 IN | HEART RATE: 94 BPM | WEIGHT: 136.19 LBS | RESPIRATION RATE: 18 BRPM | OXYGEN SATURATION: 99 % | SYSTOLIC BLOOD PRESSURE: 118 MMHG | TEMPERATURE: 99 F | DIASTOLIC BLOOD PRESSURE: 70 MMHG

## 2024-08-14 DIAGNOSIS — R05.1 ACUTE COUGH: ICD-10-CM

## 2024-08-14 DIAGNOSIS — J01.00 ACUTE NON-RECURRENT MAXILLARY SINUSITIS: Primary | ICD-10-CM

## 2024-08-14 DIAGNOSIS — Z79.899 OTHER LONG TERM (CURRENT) DRUG THERAPY: ICD-10-CM

## 2024-08-14 DIAGNOSIS — M81.0 OSTEOPOROSIS, UNSPECIFIED OSTEOPOROSIS TYPE, UNSPECIFIED PATHOLOGICAL FRACTURE PRESENCE: ICD-10-CM

## 2024-08-14 LAB
ALBUMIN SERPL BCP-MCNC: 3.9 G/DL (ref 3.5–5)
ALBUMIN/GLOB SERPL: 1.4 {RATIO}
ALP SERPL-CCNC: 70 U/L (ref 50–130)
ALT SERPL W P-5'-P-CCNC: 24 U/L (ref 13–56)
ANION GAP SERPL CALCULATED.3IONS-SCNC: 9 MMOL/L (ref 7–16)
AST SERPL W P-5'-P-CCNC: 13 U/L (ref 15–37)
BILIRUB SERPL-MCNC: 0.5 MG/DL (ref ?–1.2)
BUN SERPL-MCNC: 5 MG/DL (ref 7–18)
BUN/CREAT SERPL: 9 (ref 6–20)
CALCIUM SERPL-MCNC: 9.5 MG/DL (ref 8.5–10.1)
CHLORIDE SERPL-SCNC: 103 MMOL/L (ref 98–107)
CO2 SERPL-SCNC: 30 MMOL/L (ref 21–32)
CREAT SERPL-MCNC: 0.54 MG/DL (ref 0.55–1.02)
EGFR (NO RACE VARIABLE) (RUSH/TITUS): 103 ML/MIN/1.73M2
GLOBULIN SER-MCNC: 2.8 G/DL (ref 2–4)
GLUCOSE SERPL-MCNC: 93 MG/DL (ref 74–106)
POTASSIUM SERPL-SCNC: 4 MMOL/L (ref 3.5–5.1)
PROT SERPL-MCNC: 6.7 G/DL (ref 6.4–8.2)
SODIUM SERPL-SCNC: 138 MMOL/L (ref 136–145)

## 2024-08-14 PROCEDURE — 80053 COMPREHEN METABOLIC PANEL: CPT | Mod: ,,, | Performed by: CLINICAL MEDICAL LABORATORY

## 2024-08-14 RX ORDER — METHYLPREDNISOLONE 4 MG/1
TABLET ORAL
Qty: 21 TABLET | Refills: 0 | Status: SHIPPED | OUTPATIENT
Start: 2024-08-14

## 2024-08-14 RX ORDER — FOLIC ACID 1 MG/1
1 TABLET ORAL DAILY
Qty: 90 TABLET | Refills: 1 | Status: SHIPPED | OUTPATIENT
Start: 2024-08-14 | End: 2025-08-14

## 2024-08-14 RX ORDER — BETAMETHASONE SODIUM PHOSPHATE AND BETAMETHASONE ACETATE 3; 3 MG/ML; MG/ML
6 INJECTION, SUSPENSION INTRA-ARTICULAR; INTRALESIONAL; INTRAMUSCULAR; SOFT TISSUE
Status: COMPLETED | OUTPATIENT
Start: 2024-08-14 | End: 2024-08-14

## 2024-08-14 RX ORDER — ERGOCALCIFEROL 1.25 MG/1
50000 CAPSULE ORAL
Qty: 15 CAPSULE | Refills: 1 | Status: SHIPPED | OUTPATIENT
Start: 2024-08-14

## 2024-08-14 RX ORDER — BUSPIRONE HYDROCHLORIDE 5 MG/1
5 TABLET ORAL 2 TIMES DAILY
Qty: 180 TABLET | Refills: 1 | Status: SHIPPED | OUTPATIENT
Start: 2024-08-14 | End: 2025-08-14

## 2024-08-14 RX ORDER — TIZANIDINE 4 MG/1
4 TABLET ORAL EVERY 8 HOURS
Qty: 20 TABLET | Refills: 0 | Status: SHIPPED | OUTPATIENT
Start: 2024-08-14

## 2024-08-14 RX ADMIN — BETAMETHASONE SODIUM PHOSPHATE AND BETAMETHASONE ACETATE 6 MG: 3; 3 INJECTION, SUSPENSION INTRA-ARTICULAR; INTRALESIONAL; INTRAMUSCULAR; SOFT TISSUE at 03:08

## 2024-08-14 NOTE — PROGRESS NOTES
Vanessa Rasmussen DNP   [address] [place] [address]     PATIENT NAME: Tammy Behles  : 1960  DATE: 24  MRN: [id number]      Billing Provider: Vanessa Rasmussen DNP  Level of Service:   Patient PCP Information       Provider PCP Type    Vanessa Rasmussen DNP General            Reason for Visit / Chief Complaint: Sinus Problem (X3 weeks )       Update PCP  Update Chief Complaint         History of Present Illness / Problem Focused Workflow     Tammy Behles presents to the clinic with Sinus Problem (X3 weeks )     Sinus Problem    Review of Systems     Review of Systems     Medical / Social / Family History     Past Medical History:   Diagnosis Date    Depression     Neuropathy     Osteoporosis        Past Surgical History:   Procedure Laterality Date     SECTION      x2    ILEOSTOMY      LIVER SURGERY         Social History  Ms.  reports that she has been smoking cigarettes. She has a 20 pack-year smoking history. She has been exposed to tobacco smoke. She has never used smokeless tobacco. She reports that she does not currently use alcohol. She reports current drug use. Drug: Marijuana.    Family History  Ms.'s family history includes Cancer in her father, mother, and sister; Diverticulitis in her mother; Hypertension in her father; No Known Problems in her sister; Ulcerative colitis in her brother.    Medications and Allergies     Medications  Outpatient Medications Marked as Taking for the 24 encounter (Office Visit) with Vanessa Rasmussen DNP   Medication Sig Dispense Refill    atorvastatin (LIPITOR) 40 MG tablet Take 1 tablet (40 mg total) by mouth once daily. 90 tablet 3    EScitalopram oxalate (LEXAPRO) 20 MG tablet Take 1 tablet (20 mg total) by mouth once daily. 90 tablet 3    gabapentin (NEURONTIN) 100 MG capsule Take 2 capsules (200 mg total) by mouth 3 (three) times daily. 180 capsule 2    losartan (COZAAR) 25 MG tablet Take 1 tablet (25 mg total)  "by mouth once daily. 90 tablet 3    omeprazole (PRILOSEC) 40 MG capsule Take 1 capsule (40 mg total) by mouth once daily. 90 capsule 3    [DISCONTINUED] busPIRone (BUSPAR) 5 MG Tab Take 1 tablet (5 mg total) by mouth 2 (two) times daily. 180 tablet 1    [DISCONTINUED] ergocalciferol (ERGOCALCIFEROL) 50,000 unit Cap Take 1 capsule (50,000 Units total) by mouth every 7 days. 15 capsule 1    [DISCONTINUED] folic acid (FOLVITE) 1 MG tablet Take 1 tablet (1 mg total) by mouth once daily. 90 tablet 1     Current Facility-Administered Medications for the 8/14/24 encounter (Office Visit) with Vanessa Rasmussen DNP   Medication Dose Route Frequency Provider Last Rate Last Admin    [COMPLETED] betamethasone acetate-betamethasone sodium phosphate injection 6 mg  6 mg Intramuscular 1 time in Clinic/HOD Vanessa Rasmussen DNP   6 mg at 08/14/24 1500       Allergies  Review of patient's allergies indicates:   Allergen Reactions    Keflex [cephalexin] Itching and Rash       Physical Examination   /70 (BP Location: Left arm, Patient Position: Sitting, BP Method: Large (Automatic))   Pulse 94   Temp 98.7 °F (37.1 °C) (Oral)   Resp 18   Ht 5' 6" (1.676 m)   Wt 61.8 kg (136 lb 3.2 oz)   SpO2 99%   BMI 21.98 kg/m²    Physical Exam  Vitals and nursing note reviewed.   Constitutional:       Appearance: Normal appearance. She is normal weight. She is ill-appearing.   HENT:      Head: Normocephalic.      Ears:      Comments: Dull tm w fluid level noted.     Nose: Congestion and rhinorrhea present.      Mouth/Throat:      Mouth: Mucous membranes are moist.   Eyes:      Pupils: Pupils are equal, round, and reactive to light.   Cardiovascular:      Rate and Rhythm: Normal rate and regular rhythm.      Pulses: Normal pulses.      Heart sounds: Normal heart sounds.   Pulmonary:      Effort: Pulmonary effort is normal.      Breath sounds: Normal breath sounds.   Chest:      Chest wall: Tenderness present.   Abdominal:      " General: Abdomen is flat. Bowel sounds are normal.      Palpations: Abdomen is soft.   Musculoskeletal:      Cervical back: Normal range of motion and neck supple.      Comments: On cane with family assistance.   Lymphadenopathy:      Cervical: Cervical adenopathy present.   Skin:     General: Skin is warm and dry.      Capillary Refill: Capillary refill takes less than 2 seconds.   Neurological:      General: No focal deficit present.      Mental Status: She is alert and oriented to person, place, and time. Mental status is at baseline.   Psychiatric:         Mood and Affect: Mood normal.         Behavior: Behavior normal.         Thought Content: Thought content normal.         Judgment: Judgment normal.        Assessment and Plan (including Health Maintenance)      Problem List  Smart Sets  Document Outside HM   :    Plan:         Health Maintenance Due   Topic Date Due    TETANUS VACCINE  Never done    Shingles Vaccine (1 of 2) Never done    RSV Vaccine (Age 60+ and Pregnant patients) (1 - 1-dose 60+ series) Never done    Mammogram  10/13/2023       Problem List Items Addressed This Visit          ENT    Acute non-recurrent maxillary sinusitis - Primary       Pulmonary    Cough       Endocrine    Osteoporosis    Relevant Orders    Calcium       Palliative Care    Other long term (current) drug therapy       Health Maintenance Topics with due status: Not Due       Topic Last Completion Date    Cervical Cancer Screening 10/13/2022    Colorectal Cancer Screening 05/07/2023    LDCT Lung Screen 11/21/2023    Influenza Vaccine 04/22/2024    Lipid Panel 04/22/2024    High Dose Statin 08/14/2024       Future Appointments   Date Time Provider Department Center   9/23/2024  2:20 PM Mar Londono FNP [facility]   11/5/2024 11:00 AM AWSHRUTI NURSELAURIE [facility] [place]            Signature:  Vanessa Rasmussen, YEYO      [address] [place] [address]    Date of  encounter: 8/14/24

## 2024-08-21 ENCOUNTER — TELEPHONE (OUTPATIENT)
Dept: FAMILY MEDICINE | Facility: CLINIC | Age: 64
End: 2024-08-21
Payer: MEDICARE

## 2024-08-21 NOTE — TELEPHONE ENCOUNTER
Called and notified pt ok for prolia she is going to try to come Friday morning     Informed to call us on her way so we can set out her medicine for administration

## 2024-08-21 NOTE — TELEPHONE ENCOUNTER
----- Message from Carlyn Dowd sent at 8/21/2024  9:09 AM CDT -----  Regarding: results  Who Called: Tammy Behles    Caller is requesting information on test results.    Name of Test (Lab/Mammo/Etc): lab  Date of Test: 8/14  Where the test was performed: [facility]  Ordering Provider: Grady    Preferred Method of Contact: Phone Call  Patient's Preferred Phone Number on File: [phone]   Best Call Back Number, if different:  Additional Information: Patient also calling to see if her medicine prolia is there too

## 2024-08-30 NOTE — PATIENT INSTRUCTIONS
Consider MRI with contrast, and refer to neuro    HLD-I  suggest continuation of statin during the entire perioperative period.

## 2024-09-18 ENCOUNTER — OFFICE VISIT (OUTPATIENT)
Dept: FAMILY MEDICINE | Facility: CLINIC | Age: 64
End: 2024-09-18
Payer: MEDICARE

## 2024-09-18 VITALS
OXYGEN SATURATION: 94 % | HEIGHT: 66 IN | BODY MASS INDEX: 21.21 KG/M2 | HEART RATE: 129 BPM | WEIGHT: 132 LBS | RESPIRATION RATE: 18 BRPM | DIASTOLIC BLOOD PRESSURE: 67 MMHG | TEMPERATURE: 100 F | SYSTOLIC BLOOD PRESSURE: 118 MMHG

## 2024-09-18 DIAGNOSIS — Z74.09 OTHER REDUCED MOBILITY: ICD-10-CM

## 2024-09-18 DIAGNOSIS — R29.6 FREQUENT FALLS: ICD-10-CM

## 2024-09-18 DIAGNOSIS — R90.82 WHITE MATTER ABNORMALITY ON MRI OF BRAIN: ICD-10-CM

## 2024-09-18 DIAGNOSIS — M81.0 OSTEOPOROSIS, UNSPECIFIED OSTEOPOROSIS TYPE, UNSPECIFIED PATHOLOGICAL FRACTURE PRESENCE: Primary | ICD-10-CM

## 2024-09-18 DIAGNOSIS — R26.9 ABNORMALITY OF GAIT AND MOBILITY: ICD-10-CM

## 2024-09-18 PROCEDURE — 4010F ACE/ARB THERAPY RXD/TAKEN: CPT | Mod: CPTII,,, | Performed by: NURSE PRACTITIONER

## 2024-09-18 PROCEDURE — 99213 OFFICE O/P EST LOW 20 MIN: CPT | Mod: 25,,, | Performed by: NURSE PRACTITIONER

## 2024-09-18 PROCEDURE — 3078F DIAST BP <80 MM HG: CPT | Mod: CPTII,,, | Performed by: NURSE PRACTITIONER

## 2024-09-18 PROCEDURE — 96372 THER/PROPH/DIAG INJ SC/IM: CPT | Mod: ,,, | Performed by: NURSE PRACTITIONER

## 2024-09-18 PROCEDURE — 3044F HG A1C LEVEL LT 7.0%: CPT | Mod: CPTII,,, | Performed by: NURSE PRACTITIONER

## 2024-09-18 PROCEDURE — 3074F SYST BP LT 130 MM HG: CPT | Mod: CPTII,,, | Performed by: NURSE PRACTITIONER

## 2024-09-18 PROCEDURE — 3008F BODY MASS INDEX DOCD: CPT | Mod: CPTII,,, | Performed by: NURSE PRACTITIONER

## 2024-09-18 PROCEDURE — 1159F MED LIST DOCD IN RCRD: CPT | Mod: CPTII,,, | Performed by: NURSE PRACTITIONER

## 2024-09-18 RX ORDER — DOXYCYCLINE 100 MG/1
100 CAPSULE ORAL 2 TIMES DAILY
Qty: 14 CAPSULE | Refills: 0 | Status: SHIPPED | OUTPATIENT
Start: 2024-09-18 | End: 2024-09-25

## 2024-09-18 NOTE — PROGRESS NOTES
Vanessa Rasmussen DNP   [address] [place] [address]     PATIENT NAME: Tammy Behles  : 1960  DATE: 24  MRN: [id number]      Billing Provider: Vanessa Rasmussen DNP  Level of Service:   Patient PCP Information       Provider PCP Type    Vanessa Rasmussen DNP General            Reason for Visit / Chief Complaint: Sinus Problem       Update PCP  Update Chief Complaint         History of Present Illness / Problem Focused Workflow     Tammy Behles presents to the clinic with Sinus Problem     Sinus Problem        Review of Systems     Review of Systems     Medical / Social / Family History     Past Medical History:   Diagnosis Date    Depression     Neuropathy     Osteoporosis        Past Surgical History:   Procedure Laterality Date     SECTION      x2    ILEOSTOMY      LIVER SURGERY         Social History  Ms.  reports that she has been smoking cigarettes. She has a 20 pack-year smoking history. She has been exposed to tobacco smoke. She has never used smokeless tobacco. She reports that she does not currently use alcohol. She reports current drug use. Drug: Marijuana.    Family History  Ms.'s family history includes Cancer in her father, mother, and sister; Diverticulitis in her mother; Hypertension in her father; No Known Problems in her sister; Ulcerative colitis in her brother.    Medications and Allergies     Medications  Outpatient Medications Marked as Taking for the 24 encounter (Office Visit) with Vanessa Rasmussen DNP   Medication Sig Dispense Refill    atorvastatin (LIPITOR) 40 MG tablet Take 1 tablet (40 mg total) by mouth once daily. 90 tablet 3    busPIRone (BUSPAR) 5 MG Tab Take 1 tablet (5 mg total) by mouth 2 (two) times daily. 180 tablet 1    denosumab (PROLIA) 60 mg/mL Syrg Inject 1 mL (60 mg total) into the skin every 6 (six) months. 2 mL 1    ergocalciferol (ERGOCALCIFEROL) 50,000 unit Cap Take 1 capsule (50,000 Units total) by mouth every 7 days. 15  "capsule 1    EScitalopram oxalate (LEXAPRO) 20 MG tablet Take 1 tablet (20 mg total) by mouth once daily. 90 tablet 3    folic acid (FOLVITE) 1 MG tablet Take 1 tablet (1 mg total) by mouth once daily. 90 tablet 1    gabapentin (NEURONTIN) 100 MG capsule Take 2 capsules (200 mg total) by mouth 3 (three) times daily. 180 capsule 2    losartan (COZAAR) 25 MG tablet Take 1 tablet (25 mg total) by mouth once daily. 90 tablet 3    omeprazole (PRILOSEC) 40 MG capsule Take 1 capsule (40 mg total) by mouth once daily. 90 capsule 3    tiZANidine (ZANAFLEX) 4 MG tablet Take 1 tablet (4 mg total) by mouth every 8 (eight) hours. 20 tablet 0     Current Facility-Administered Medications for the 9/18/24 encounter (Office Visit) with Vanessa Rasmussen DNP   Medication Dose Route Frequency Provider Last Rate Last Admin    [COMPLETED] denosumab (PROLIA) injection 60 mg  60 mg Subcutaneous 1 time in Clinic/HOD Vanessa Rasmussen DNP   60 mg at 09/18/24 1040       Allergies  Review of patient's allergies indicates:   Allergen Reactions    Keflex [cephalexin] Itching and Rash       Physical Examination   /67 (BP Location: Right arm, Patient Position: Sitting, BP Method: Medium (Automatic))   Pulse (!) 129   Temp 99.9 °F (37.7 °C) (Oral)   Resp 18   Ht 5' 6" (1.676 m)   Wt 59.9 kg (132 lb)   SpO2 (!) 94%   BMI 21.31 kg/m²    Physical Exam  Vitals and nursing note reviewed.   Constitutional:       Appearance: Normal appearance. She is normal weight. She is ill-appearing.   HENT:      Head: Normocephalic.      Ears:      Comments: Dull tm w fluid level noted.     Nose: Congestion and rhinorrhea present.      Mouth/Throat:      Mouth: Mucous membranes are moist.   Eyes:      Pupils: Pupils are equal, round, and reactive to light.   Cardiovascular:      Rate and Rhythm: Normal rate and regular rhythm.      Pulses: Normal pulses.      Heart sounds: Normal heart sounds.   Pulmonary:      Effort: Pulmonary effort is normal.      " Breath sounds: Normal breath sounds.   Chest:      Chest wall: Tenderness present.   Abdominal:      General: Abdomen is flat. Bowel sounds are normal.      Palpations: Abdomen is soft.   Musculoskeletal:      Cervical back: Normal range of motion and neck supple.      Comments: On cane with family assistance.   Lymphadenopathy:      Cervical: Cervical adenopathy present.   Skin:     General: Skin is warm and dry.      Capillary Refill: Capillary refill takes less than 2 seconds.   Neurological:      General: No focal deficit present.      Mental Status: She is alert and oriented to person, place, and time. Mental status is at baseline.   Psychiatric:         Mood and Affect: Mood normal.         Behavior: Behavior normal.         Thought Content: Thought content normal.         Judgment: Judgment normal.          Assessment and Plan (including Health Maintenance)      Problem List  Smart Sets  Document Outside HM   :    Plan:     Not getting better from previous sinusitis.   Change abx to doxycyline.   Change toothbrush,. Airfilters, linens    Needs mmg  Seeing dr annie tripp in [place]  Had abnormal MRI spine and brain.   Needing referral to spine ortho. Request to go to [place].     Health Maintenance Due   Topic Date Due    TETANUS VACCINE  Never done    Shingles Vaccine (1 of 2) Never done    RSV Vaccine (Age 60+ and Pregnant patients) (1 - 1-dose 60+ series) Never done    Mammogram  10/13/2023    Influenza Vaccine (1) 09/01/2024    COVID-19 Vaccine (1 - 2023-24 season) Never done       Problem List Items Addressed This Visit          Neuro    White matter abnormality on MRI of brain       Endocrine    BMI 22.0-22.9, adult    Osteoporosis - Primary    Relevant Medications    denosumab (PROLIA) injection 60 mg (Completed)       Other    Abnormality of gait and mobility    Frequent falls    Other reduced mobility       Health Maintenance Topics with due status: Not Due       Topic Last Completion Date     Cervical Cancer Screening 10/13/2022    Colorectal Cancer Screening 05/07/2023    LDCT Lung Screen 11/21/2023    Lipid Panel 04/22/2024    High Dose Statin 09/18/2024       Future Appointments   Date Time Provider Department Center   9/23/2024  2:20 PM Mar Londono FNP [facility]   10/23/2024 10:45 AM Vanessa Rasmussen DNP [facility] [place]   11/5/2024 11:00 AM AWSHRUTI NURSE [facility] [place]            Signature:  Vanessa Rasmussen DNP      [address] [place] [address]    Date of encounter: 9/18/24

## 2024-10-23 ENCOUNTER — OFFICE VISIT (OUTPATIENT)
Dept: FAMILY MEDICINE | Facility: CLINIC | Age: 64
End: 2024-10-23
Payer: MEDICARE

## 2024-10-23 VITALS
SYSTOLIC BLOOD PRESSURE: 132 MMHG | BODY MASS INDEX: 20.7 KG/M2 | HEIGHT: 66 IN | OXYGEN SATURATION: 93 % | WEIGHT: 128.81 LBS | HEART RATE: 126 BPM | DIASTOLIC BLOOD PRESSURE: 70 MMHG | TEMPERATURE: 99 F

## 2024-10-23 DIAGNOSIS — R26.9 ABNORMALITY OF GAIT AND MOBILITY: ICD-10-CM

## 2024-10-23 DIAGNOSIS — D50.8 IRON DEFICIENCY ANEMIA SECONDARY TO INADEQUATE DIETARY IRON INTAKE: ICD-10-CM

## 2024-10-23 DIAGNOSIS — R90.82 WHITE MATTER ABNORMALITY ON MRI OF BRAIN: ICD-10-CM

## 2024-10-23 DIAGNOSIS — E07.9 THYROID DISORDER: ICD-10-CM

## 2024-10-23 DIAGNOSIS — Z13.6 ENCOUNTER FOR SCREENING FOR CARDIOVASCULAR DISORDERS: ICD-10-CM

## 2024-10-23 DIAGNOSIS — I10 HYPERTENSION, UNSPECIFIED TYPE: ICD-10-CM

## 2024-10-23 DIAGNOSIS — Z79.899 OTHER LONG TERM (CURRENT) DRUG THERAPY: ICD-10-CM

## 2024-10-23 DIAGNOSIS — M81.0 OSTEOPOROSIS, UNSPECIFIED OSTEOPOROSIS TYPE, UNSPECIFIED PATHOLOGICAL FRACTURE PRESENCE: ICD-10-CM

## 2024-10-23 DIAGNOSIS — Z23 NEED FOR VACCINATION: ICD-10-CM

## 2024-10-23 DIAGNOSIS — F33.1 MODERATE EPISODE OF RECURRENT MAJOR DEPRESSIVE DISORDER: Primary | ICD-10-CM

## 2024-10-23 LAB
ALBUMIN SERPL BCP-MCNC: 3.8 G/DL (ref 3.5–5)
ALBUMIN/GLOB SERPL: 1.2 {RATIO}
ALP SERPL-CCNC: 66 U/L (ref 50–130)
ALT SERPL W P-5'-P-CCNC: 15 U/L (ref 13–56)
ANION GAP SERPL CALCULATED.3IONS-SCNC: 14 MMOL/L (ref 7–16)
AST SERPL W P-5'-P-CCNC: 13 U/L (ref 15–37)
BASOPHILS # BLD AUTO: 0.06 K/UL (ref 0–0.2)
BASOPHILS NFR BLD AUTO: 0.5 % (ref 0–1)
BILIRUB SERPL-MCNC: 0.5 MG/DL (ref ?–1.2)
BUN SERPL-MCNC: 6 MG/DL (ref 7–18)
BUN/CREAT SERPL: 11 (ref 6–20)
CALCIUM SERPL-MCNC: 8.8 MG/DL (ref 8.5–10.1)
CHLORIDE SERPL-SCNC: 94 MMOL/L (ref 98–107)
CHOLEST SERPL-MCNC: 103 MG/DL (ref 0–200)
CHOLEST/HDLC SERPL: 2.4 {RATIO}
CO2 SERPL-SCNC: 25 MMOL/L (ref 21–32)
CREAT SERPL-MCNC: 0.57 MG/DL (ref 0.55–1.02)
DIFFERENTIAL METHOD BLD: ABNORMAL
EGFR (NO RACE VARIABLE) (RUSH/TITUS): 102 ML/MIN/1.73M2
EOSINOPHIL # BLD AUTO: 0.06 K/UL (ref 0–0.5)
EOSINOPHIL NFR BLD AUTO: 0.5 % (ref 1–4)
ERYTHROCYTE [DISTWIDTH] IN BLOOD BY AUTOMATED COUNT: 12.3 % (ref 11.5–14.5)
GLOBULIN SER-MCNC: 3.2 G/DL (ref 2–4)
GLUCOSE SERPL-MCNC: 104 MG/DL (ref 74–106)
HCT VFR BLD AUTO: 43.2 % (ref 38–47)
HDLC SERPL-MCNC: 43 MG/DL (ref 40–60)
HGB BLD-MCNC: 14.6 G/DL (ref 12–16)
IMM GRANULOCYTES # BLD AUTO: 0.04 K/UL (ref 0–0.04)
IMM GRANULOCYTES NFR BLD: 0.4 % (ref 0–0.4)
IRON SATN MFR SERPL: 35 % (ref 14–50)
IRON SERPL-MCNC: 94 ΜG/DL (ref 50–170)
LDLC SERPL CALC-MCNC: 43 MG/DL
LDLC/HDLC SERPL: 1 {RATIO}
LYMPHOCYTES # BLD AUTO: 2.07 K/UL (ref 1–4.8)
LYMPHOCYTES NFR BLD AUTO: 18.4 % (ref 27–41)
MCH RBC QN AUTO: 31 PG (ref 27–31)
MCHC RBC AUTO-ENTMCNC: 33.8 G/DL (ref 32–36)
MCV RBC AUTO: 91.7 FL (ref 80–96)
MONOCYTES # BLD AUTO: 0.79 K/UL (ref 0–0.8)
MONOCYTES NFR BLD AUTO: 7 % (ref 2–6)
MPC BLD CALC-MCNC: 11.6 FL (ref 9.4–12.4)
NEUTROPHILS # BLD AUTO: 8.25 K/UL (ref 1.8–7.7)
NEUTROPHILS NFR BLD AUTO: 73.2 % (ref 53–65)
NONHDLC SERPL-MCNC: 60 MG/DL
NRBC # BLD AUTO: 0 X10E3/UL
NRBC, AUTO (.00): 0 %
PLATELET # BLD AUTO: 290 K/UL (ref 150–400)
POTASSIUM SERPL-SCNC: 3.8 MMOL/L (ref 3.5–5.1)
PROT SERPL-MCNC: 7 G/DL (ref 6.4–8.2)
RBC # BLD AUTO: 4.71 M/UL (ref 4.2–5.4)
SODIUM SERPL-SCNC: 129 MMOL/L (ref 136–145)
T4 FREE SERPL-MCNC: 0.94 NG/DL (ref 0.76–1.46)
TIBC SERPL-MCNC: 268 ΜG/DL (ref 250–450)
TRIGL SERPL-MCNC: 84 MG/DL (ref 35–150)
TSH SERPL DL<=0.005 MIU/L-ACNC: 0.27 UIU/ML (ref 0.36–3.74)
VLDLC SERPL-MCNC: 17 MG/DL
WBC # BLD AUTO: 11.27 K/UL (ref 4.5–11)

## 2024-10-23 PROCEDURE — 84439 ASSAY OF FREE THYROXINE: CPT | Mod: ,,, | Performed by: CLINICAL MEDICAL LABORATORY

## 2024-10-23 PROCEDURE — 3008F BODY MASS INDEX DOCD: CPT | Mod: CPTII,,, | Performed by: NURSE PRACTITIONER

## 2024-10-23 PROCEDURE — 85025 COMPLETE CBC W/AUTO DIFF WBC: CPT | Mod: ,,, | Performed by: CLINICAL MEDICAL LABORATORY

## 2024-10-23 PROCEDURE — 90656 IIV3 VACC NO PRSV 0.5 ML IM: CPT | Mod: ,,, | Performed by: NURSE PRACTITIONER

## 2024-10-23 PROCEDURE — 3075F SYST BP GE 130 - 139MM HG: CPT | Mod: CPTII,,, | Performed by: NURSE PRACTITIONER

## 2024-10-23 PROCEDURE — 83540 ASSAY OF IRON: CPT | Mod: ,,, | Performed by: CLINICAL MEDICAL LABORATORY

## 2024-10-23 PROCEDURE — 3078F DIAST BP <80 MM HG: CPT | Mod: CPTII,,, | Performed by: NURSE PRACTITIONER

## 2024-10-23 PROCEDURE — 80053 COMPREHEN METABOLIC PANEL: CPT | Mod: ,,, | Performed by: CLINICAL MEDICAL LABORATORY

## 2024-10-23 PROCEDURE — 3044F HG A1C LEVEL LT 7.0%: CPT | Mod: CPTII,,, | Performed by: NURSE PRACTITIONER

## 2024-10-23 PROCEDURE — 83550 IRON BINDING TEST: CPT | Mod: ,,, | Performed by: CLINICAL MEDICAL LABORATORY

## 2024-10-23 PROCEDURE — 1159F MED LIST DOCD IN RCRD: CPT | Mod: CPTII,,, | Performed by: NURSE PRACTITIONER

## 2024-10-23 PROCEDURE — 84443 ASSAY THYROID STIM HORMONE: CPT | Mod: ,,, | Performed by: CLINICAL MEDICAL LABORATORY

## 2024-10-23 PROCEDURE — 99214 OFFICE O/P EST MOD 30 MIN: CPT | Mod: 25,,, | Performed by: NURSE PRACTITIONER

## 2024-10-23 PROCEDURE — 4010F ACE/ARB THERAPY RXD/TAKEN: CPT | Mod: CPTII,,, | Performed by: NURSE PRACTITIONER

## 2024-10-23 PROCEDURE — G0008 ADMIN INFLUENZA VIRUS VAC: HCPCS | Mod: ,,, | Performed by: NURSE PRACTITIONER

## 2024-10-23 PROCEDURE — 80061 LIPID PANEL: CPT | Mod: ,,, | Performed by: CLINICAL MEDICAL LABORATORY

## 2024-10-23 RX ORDER — BUSPIRONE HYDROCHLORIDE 10 MG/1
10 TABLET ORAL 3 TIMES DAILY
Qty: 270 TABLET | Refills: 3 | Status: SHIPPED | OUTPATIENT
Start: 2024-10-23 | End: 2025-10-23

## 2024-10-23 NOTE — PROGRESS NOTES
Vanessa Rasmussen DNP   [address] [place] [address]     PATIENT NAME: Tammy Behles  : 1960  DATE: 10/23/24  MRN: [id number]      Billing Provider: Vanessa Rasmussen DNP  Level of Service:   Patient PCP Information       Provider PCP Type    Vanessa Rasmussen DNP General            Reason for Visit / Chief Complaint: Annual Exam       Update PCP  Update Chief Complaint         History of Present Illness / Problem Focused Workflow     Tammy Behles presents to the clinic with Annual Exam     HPI    Review of Systems     Review of Systems     Medical / Social / Family History     Past Medical History:   Diagnosis Date    Depression     Hyperlipidemia     Hypertension     Neuropathy     Osteoporosis        Past Surgical History:   Procedure Laterality Date     SECTION      x2    ILEOSTOMY      LIVER SURGERY         Social History  Ms.  reports that she has been smoking cigarettes. She started smoking about 39 years ago. She has a 19.9 pack-year smoking history. She has been exposed to tobacco smoke. She has never used smokeless tobacco. She reports that she does not currently use alcohol. She reports current drug use. Drug: Marijuana.    Family History  Ms.'s family history includes Cancer in her father, mother, and sister; Diverticulitis in her mother; Hypertension in her father; No Known Problems in her sister; Ulcerative colitis in her brother.    Medications and Allergies     Medications  Outpatient Medications Marked as Taking for the 10/23/24 encounter (Office Visit) with Vanessa Rasmussen DNP   Medication Sig Dispense Refill    atorvastatin (LIPITOR) 40 MG tablet Take 1 tablet (40 mg total) by mouth once daily. 90 tablet 3    denosumab (PROLIA) 60 mg/mL Syrg Inject 1 mL (60 mg total) into the skin every 6 (six) months. 2 mL 1    EScitalopram oxalate (LEXAPRO) 20 MG tablet Take 1 tablet (20 mg total) by mouth once daily. 90 tablet 3    folic acid (FOLVITE) 1 MG  "tablet Take 1 tablet (1 mg total) by mouth once daily. 90 tablet 1    gabapentin (NEURONTIN) 100 MG capsule Take 2 capsules (200 mg total) by mouth 3 (three) times daily. 180 capsule 2    losartan (COZAAR) 25 MG tablet Take 1 tablet (25 mg total) by mouth once daily. 90 tablet 3    omeprazole (PRILOSEC) 40 MG capsule Take 1 capsule (40 mg total) by mouth once daily. 90 capsule 3    tiZANidine (ZANAFLEX) 4 MG tablet Take 1 tablet (4 mg total) by mouth every 8 (eight) hours. 20 tablet 0    [DISCONTINUED] busPIRone (BUSPAR) 5 MG Tab Take 1 tablet (5 mg total) by mouth 2 (two) times daily. 180 tablet 1     Current Facility-Administered Medications for the 10/23/24 encounter (Office Visit) with Vanessa Rasmussen DNP   Medication Dose Route Frequency Provider Last Rate Last Admin    [COMPLETED] influenza (Flulaval, Fluzone, Fluarix) 45 mcg/0.5 mL IM vaccine (> or = 6 mo) 0.5 mL  0.5 mL Intramuscular 1 time in Clinic/HOD Vanessa Rasmussen DNP   0.5 mL at 10/23/24 1221       Allergies  Review of patient's allergies indicates:   Allergen Reactions    Keflex [cephalexin] Itching and Rash       Physical Examination   /70 (BP Location: Right arm, Patient Position: Sitting)   Pulse (!) 126   Temp 98.9 °F (37.2 °C) (Oral)   Ht 5' 6" (1.676 m)   Wt 58.4 kg (128 lb 12.8 oz)   SpO2 (!) 93%   BMI 20.79 kg/m²    Physical Exam  Vitals and nursing note reviewed.   Constitutional:       General: She is in acute distress.      Appearance: Normal appearance. She is normal weight. She is ill-appearing.   HENT:      Head: Normocephalic.      Nose: Nose normal.      Mouth/Throat:      Mouth: Mucous membranes are moist.   Eyes:      Pupils: Pupils are equal, round, and reactive to light.   Cardiovascular:      Rate and Rhythm: Normal rate and regular rhythm.      Pulses: Normal pulses.      Heart sounds: Normal heart sounds.   Pulmonary:      Effort: Pulmonary effort is normal.      Breath sounds: Normal breath sounds. "   Abdominal:      General: Abdomen is flat. Bowel sounds are normal.      Palpations: Abdomen is soft.      Comments: Scarring rt mid abd from ileostomy reversal.   Musculoskeletal:      Cervical back: Normal range of motion and neck supple.      Comments: Cane and partner to assist with walking and getting around.   Skin:     General: Skin is warm and dry.      Capillary Refill: Capillary refill takes less than 2 seconds.   Neurological:      General: No focal deficit present.      Mental Status: She is alert and oriented to person, place, and time. Mental status is at baseline.   Psychiatric:         Mood and Affect: Mood normal.         Behavior: Behavior normal.         Judgment: Judgment normal.        Assessment and Plan (including Health Maintenance)      Problem List  Smart Sets  Document Outside HM   :    Plan:   Pt reports here today because she feels extremely stressed and down. Reports dealing with neuro - dr valencia for abnormal brain MRI   States pending another test for genetic MS - LP was negative for MS   Pt tearful on exam - states her bipolar troubled son is back living with her and has CML   She states her house if filth covered with roaches and rats crawling everywhere.   Pt venting and upset today -  Discussed letting some situational stressors die down and focus on what's most important to keep her on track with her health.   Pt agrees to increase buspar dosage - she states when she takes the 5mg she does get some relief but feels it wears off quickly. Increase to 10mg tid     Labs today   Mmg - 2023 due now.      Cologuard - 5/2023  Had a colon abscess went to [place] and was transferred to [facility] and had emergency surgery with ileostomy and then had reversal done nearly a year later.. 2020?     Pap - 10/2022  Smokes 1 ppd x15 years. Currently quit      Unable to walk without cane and assistance  Pcv20 2/2023  Flu vax given today      Needs tdap at pharmacy. Pt aware  Refill meds  Labs today        Health Maintenance Due   Topic Date Due    TETANUS VACCINE  Never done    Shingles Vaccine (1 of 2) Never done    RSV Vaccine (Age 60+ and Pregnant patients) (1 - Risk 60-74 years 1-dose series) Never done    Mammogram  10/13/2023    COVID-19 Vaccine (1 - 2024-25 season) Never done       Problem List Items Addressed This Visit          Neuro    White matter abnormality on MRI of brain       Psychiatric    Depression - Primary       Cardiac/Vascular    Encounter for screening for cardiovascular disorders    Relevant Orders    Lipid Panel    Hypertension    Relevant Orders    Comprehensive Metabolic Panel    CBC Auto Differential       ID    Need for vaccination    Relevant Medications    influenza (Flulaval, Fluzone, Fluarix) 45 mcg/0.5 mL IM vaccine (> or = 6 mo) 0.5 mL (Completed)       Oncology    Iron deficiency anemia secondary to inadequate dietary iron intake    Relevant Orders    Iron and TIBC       Endocrine    BMI 22.0-22.9, adult    Osteoporosis    Thyroid disorder    Relevant Orders    Thyroid Panel       Palliative Care    Other long term (current) drug therapy       Other    Abnormality of gait and mobility       Health Maintenance Topics with due status: Not Due       Topic Last Completion Date    Cervical Cancer Screening 10/13/2022    Colorectal Cancer Screening 05/07/2023    Lipid Panel 04/22/2024    High Dose Statin 10/23/2024       Future Appointments   Date Time Provider Department Center   11/5/2024 11[facility]00 [facility] [place]   2/5/2025 10:00 AM Vanessa Rasmussen DNP [facility] [place]            Signature:  Vanessa Rasmussen DNP      [address] [place] [address]    Date of encounter: 10/23/24

## 2024-10-25 NOTE — PROGRESS NOTES
Sodium extremely low. She was upset at last visit and had a lot going on. See if she can make it to town to repeat because this low can cause seizures and other issues. Surely this is a lab error?

## 2024-10-28 ENCOUNTER — PATIENT MESSAGE (OUTPATIENT)
Dept: FAMILY MEDICINE | Facility: CLINIC | Age: 64
End: 2024-10-28
Payer: MEDICARE

## 2024-10-28 ENCOUNTER — TELEPHONE (OUTPATIENT)
Dept: FAMILY MEDICINE | Facility: CLINIC | Age: 64
End: 2024-10-28
Payer: MEDICARE

## 2024-10-28 DIAGNOSIS — E87.1 HYPONATREMIA: Primary | ICD-10-CM

## 2024-10-29 ENCOUNTER — TELEPHONE (OUTPATIENT)
Dept: FAMILY MEDICINE | Facility: CLINIC | Age: 64
End: 2024-10-29
Payer: MEDICARE

## 2024-10-29 ENCOUNTER — OFFICE VISIT (OUTPATIENT)
Dept: FAMILY MEDICINE | Facility: CLINIC | Age: 64
End: 2024-10-29
Payer: MEDICARE

## 2024-10-29 VITALS
SYSTOLIC BLOOD PRESSURE: 109 MMHG | WEIGHT: 128.63 LBS | DIASTOLIC BLOOD PRESSURE: 64 MMHG | HEIGHT: 66 IN | HEART RATE: 104 BPM | OXYGEN SATURATION: 97 % | BODY MASS INDEX: 20.67 KG/M2 | TEMPERATURE: 99 F

## 2024-10-29 DIAGNOSIS — E87.1 HYPONATREMIA: ICD-10-CM

## 2024-10-29 DIAGNOSIS — J01.00 ACUTE NON-RECURRENT MAXILLARY SINUSITIS: Primary | ICD-10-CM

## 2024-10-29 DIAGNOSIS — J02.9 SORE THROAT: ICD-10-CM

## 2024-10-29 DIAGNOSIS — R05.1 ACUTE COUGH: ICD-10-CM

## 2024-10-29 PROBLEM — R06.2 WHEEZING: Status: RESOLVED | Noted: 2023-10-18 | Resolved: 2024-10-29

## 2024-10-29 PROBLEM — A08.4 VIRAL GASTROENTERITIS: Status: RESOLVED | Noted: 2023-07-17 | Resolved: 2024-10-29

## 2024-10-29 PROBLEM — Z48.02 ENCOUNTER FOR REMOVAL OF SUTURES: Status: RESOLVED | Noted: 2022-09-29 | Resolved: 2024-10-29

## 2024-10-29 PROBLEM — R09.02 HYPOXIA: Status: RESOLVED | Noted: 2023-10-18 | Resolved: 2024-10-29

## 2024-10-29 PROBLEM — Z11.59 SCREENING FOR VIRAL DISEASE: Status: RESOLVED | Noted: 2022-09-21 | Resolved: 2024-10-29

## 2024-10-29 PROBLEM — R03.0 ELEVATED BLOOD PRESSURE READING: Status: RESOLVED | Noted: 2022-09-21 | Resolved: 2024-10-29

## 2024-10-29 PROCEDURE — 3008F BODY MASS INDEX DOCD: CPT | Mod: CPTII,,, | Performed by: NURSE PRACTITIONER

## 2024-10-29 PROCEDURE — 3074F SYST BP LT 130 MM HG: CPT | Mod: CPTII,,, | Performed by: NURSE PRACTITIONER

## 2024-10-29 PROCEDURE — 96372 THER/PROPH/DIAG INJ SC/IM: CPT | Mod: ,,, | Performed by: NURSE PRACTITIONER

## 2024-10-29 PROCEDURE — 1159F MED LIST DOCD IN RCRD: CPT | Mod: CPTII,,, | Performed by: NURSE PRACTITIONER

## 2024-10-29 PROCEDURE — 4010F ACE/ARB THERAPY RXD/TAKEN: CPT | Mod: CPTII,,, | Performed by: NURSE PRACTITIONER

## 2024-10-29 PROCEDURE — 3044F HG A1C LEVEL LT 7.0%: CPT | Mod: CPTII,,, | Performed by: NURSE PRACTITIONER

## 2024-10-29 PROCEDURE — 80048 BASIC METABOLIC PNL TOTAL CA: CPT | Mod: ,,, | Performed by: CLINICAL MEDICAL LABORATORY

## 2024-10-29 PROCEDURE — 99212 OFFICE O/P EST SF 10 MIN: CPT | Mod: 25,,, | Performed by: NURSE PRACTITIONER

## 2024-10-29 PROCEDURE — 3078F DIAST BP <80 MM HG: CPT | Mod: CPTII,,, | Performed by: NURSE PRACTITIONER

## 2024-10-29 RX ORDER — METHYLPREDNISOLONE ACETATE 40 MG/ML
40 INJECTION, SUSPENSION INTRA-ARTICULAR; INTRALESIONAL; INTRAMUSCULAR; SOFT TISSUE
Status: COMPLETED | OUTPATIENT
Start: 2024-10-29 | End: 2024-10-29

## 2024-10-29 RX ORDER — DEXAMETHASONE SODIUM PHOSPHATE 4 MG/ML
4 INJECTION, SOLUTION INTRA-ARTICULAR; INTRALESIONAL; INTRAMUSCULAR; INTRAVENOUS; SOFT TISSUE
Status: COMPLETED | OUTPATIENT
Start: 2024-10-29 | End: 2024-10-29

## 2024-10-29 RX ORDER — KETOROLAC TROMETHAMINE 30 MG/ML
60 INJECTION, SOLUTION INTRAMUSCULAR; INTRAVENOUS
Status: COMPLETED | OUTPATIENT
Start: 2024-10-29 | End: 2024-10-29

## 2024-10-29 RX ADMIN — DEXAMETHASONE SODIUM PHOSPHATE 4 MG: 4 INJECTION, SOLUTION INTRA-ARTICULAR; INTRALESIONAL; INTRAMUSCULAR; INTRAVENOUS; SOFT TISSUE at 04:10

## 2024-10-29 RX ADMIN — KETOROLAC TROMETHAMINE 60 MG: 30 INJECTION, SOLUTION INTRAMUSCULAR; INTRAVENOUS at 04:10

## 2024-10-29 RX ADMIN — METHYLPREDNISOLONE ACETATE 40 MG: 40 INJECTION, SUSPENSION INTRA-ARTICULAR; INTRALESIONAL; INTRAMUSCULAR; SOFT TISSUE at 04:10

## 2024-10-30 LAB
ANION GAP SERPL CALCULATED.3IONS-SCNC: 7 MMOL/L (ref 7–16)
BUN SERPL-MCNC: 4 MG/DL (ref 7–18)
BUN/CREAT SERPL: 6 (ref 6–20)
CALCIUM SERPL-MCNC: 8.6 MG/DL (ref 8.5–10.1)
CHLORIDE SERPL-SCNC: 95 MMOL/L (ref 98–107)
CO2 SERPL-SCNC: 31 MMOL/L (ref 21–32)
CREAT SERPL-MCNC: 0.69 MG/DL (ref 0.55–1.02)
EGFR (NO RACE VARIABLE) (RUSH/TITUS): 97 ML/MIN/1.73M2
GLUCOSE SERPL-MCNC: 106 MG/DL (ref 74–106)
POTASSIUM SERPL-SCNC: 3.8 MMOL/L (ref 3.5–5.1)
SODIUM SERPL-SCNC: 129 MMOL/L (ref 136–145)

## 2024-11-13 ENCOUNTER — TELEPHONE (OUTPATIENT)
Dept: FAMILY MEDICINE | Facility: CLINIC | Age: 64
End: 2024-11-13
Payer: MEDICARE

## 2024-11-13 NOTE — TELEPHONE ENCOUNTER
Patient stated Neuro told her she needed to see you that was not there speciality and she stated she feels pretty bad and thinks she might need to go to the ED for IV fluids     I informed her if she feels symptomatic she should be seen in ED

## 2024-11-13 NOTE — TELEPHONE ENCOUNTER
----- Message from Saundra sent at 11/13/2024  8:48 AM CST -----  Regarding: RESULTS  Who Called: Tammy Behles    Caller is requesting information on test results.    Name of Test (Lab/Mammo/Etc): LAB  Date of Test: 11-  Where the test was performed: AT HER NEUROLOGIST'S OFFICE  Ordering Provider: DR MARISSA ROQUE    Preferred Method of Contact: Phone Call  Patient's Preferred Phone Number on File: [phone]  Best Call Back Number, if different:  Additional Information: NEEDS TO SPEAK WITH THE NURSE ABOUT LAB WORK THAT WAS DONE AT HER NEUROLOGIST'S OFFICE ON MONDAY.SHE SAYS HER SODIUM IS .

## 2024-12-02 ENCOUNTER — TELEPHONE (OUTPATIENT)
Dept: FAMILY MEDICINE | Facility: CLINIC | Age: 64
End: 2024-12-02
Payer: MEDICARE

## 2024-12-02 DIAGNOSIS — E87.1 HYPONATREMIA: Primary | ICD-10-CM

## 2024-12-02 NOTE — TELEPHONE ENCOUNTER
----- Message from Feli sent at 12/2/2024  8:56 AM CST -----  Regarding: Referral request  Who Called: Tammy Behles    Caller is requesting assistance/information from provider's office.    Symptoms (please be specific): low sodium and other symptoms    Preferred Method of Contact: Phone Call  Patient's Preferred Phone Number on File: [phone]   Best Call Back Number, if different:  Additional Information: Pt. Requesting her PCP to give her a endocrinologist referral. Pt. Stated her neurologist could not help w/ her symptoms

## 2024-12-02 NOTE — TELEPHONE ENCOUNTER
Patient wanted to know if you can do a referral for the endocrinologist for her low sodium issues the neurologist isn't helping her with this

## 2024-12-03 ENCOUNTER — TELEPHONE (OUTPATIENT)
Dept: FAMILY MEDICINE | Facility: CLINIC | Age: 64
End: 2024-12-03
Payer: MEDICARE

## 2024-12-03 NOTE — TELEPHONE ENCOUNTER
----- Message from Jodi sent at 12/3/2024  9:12 AM CST -----  Who Called: Tammy Behles    Caller is requesting assistance/information from provider's office.    Pt is wanting to get more information about referral for endocrinology     Preferred Method of Contact: Phone Call  Patient's Preferred Phone Number on File: [phone]   Best Call Back Number, if different:  Additional Information:

## 2024-12-19 ENCOUNTER — TELEPHONE (OUTPATIENT)
Dept: FAMILY MEDICINE | Facility: CLINIC | Age: 64
End: 2024-12-19
Payer: MEDICARE

## 2024-12-19 DIAGNOSIS — E07.9 THYROID DISORDER: Primary | ICD-10-CM

## 2024-12-19 NOTE — TELEPHONE ENCOUNTER
----- Message from Amanda sent at 12/19/2024  4:11 PM CST -----    ----- Message -----  From: Kaci Garcia  Sent: 12/19/2024   4:09 PM CST  To: Grady Sanchez    Hello, Dr Pamela Douglass denied patient's referral for Endocrinology. I will be canceling that referrral, it's put in incorrectly and I need one for external please for endocrinology to send to [facility], which is where patient told me she wants to try next.    Thank you, Kaci [facility]

## 2024-12-25 ENCOUNTER — PATIENT MESSAGE (OUTPATIENT)
Dept: FAMILY MEDICINE | Facility: CLINIC | Age: 64
End: 2024-12-25
Payer: MEDICARE

## 2024-12-25 DIAGNOSIS — Z72.0 TOBACCO USE: ICD-10-CM

## 2024-12-25 DIAGNOSIS — Z12.2 ENCOUNTER FOR SCREENING FOR LUNG CANCER: ICD-10-CM

## 2024-12-25 DIAGNOSIS — R91.1 SOLITARY PULMONARY NODULE: Primary | ICD-10-CM

## 2024-12-25 DIAGNOSIS — F17.218 NICOTINE DEPENDENCE, CIGARETTES, WITH OTHER NICOTINE-INDUCED DISORDERS: ICD-10-CM

## 2025-01-06 RX ORDER — TIZANIDINE 4 MG/1
4 TABLET ORAL EVERY 8 HOURS
Qty: 20 TABLET | Refills: 0 | Status: SHIPPED | OUTPATIENT
Start: 2025-01-06

## 2025-02-05 ENCOUNTER — OFFICE VISIT (OUTPATIENT)
Dept: FAMILY MEDICINE | Facility: CLINIC | Age: 65
End: 2025-02-05
Payer: MEDICARE

## 2025-02-05 VITALS
TEMPERATURE: 98 F | OXYGEN SATURATION: 96 % | HEIGHT: 66 IN | BODY MASS INDEX: 20.63 KG/M2 | SYSTOLIC BLOOD PRESSURE: 102 MMHG | DIASTOLIC BLOOD PRESSURE: 63 MMHG | WEIGHT: 128.38 LBS | HEART RATE: 95 BPM

## 2025-02-05 DIAGNOSIS — Z13.6 ENCOUNTER FOR SCREENING FOR CARDIOVASCULAR DISORDERS: ICD-10-CM

## 2025-02-05 DIAGNOSIS — R26.9 ABNORMALITY OF GAIT AND MOBILITY: ICD-10-CM

## 2025-02-05 DIAGNOSIS — J43.8 OTHER EMPHYSEMA: ICD-10-CM

## 2025-02-05 DIAGNOSIS — R91.1 SOLITARY PULMONARY NODULE: ICD-10-CM

## 2025-02-05 DIAGNOSIS — J01.00 ACUTE NON-RECURRENT MAXILLARY SINUSITIS: ICD-10-CM

## 2025-02-05 DIAGNOSIS — R90.82 WHITE MATTER ABNORMALITY ON MRI OF BRAIN: Primary | ICD-10-CM

## 2025-02-05 DIAGNOSIS — E87.1 HYPONATREMIA: ICD-10-CM

## 2025-02-05 DIAGNOSIS — I10 HYPERTENSION, UNSPECIFIED TYPE: ICD-10-CM

## 2025-02-05 DIAGNOSIS — Z74.09 OTHER REDUCED MOBILITY: ICD-10-CM

## 2025-02-05 DIAGNOSIS — M81.0 OSTEOPOROSIS, UNSPECIFIED OSTEOPOROSIS TYPE, UNSPECIFIED PATHOLOGICAL FRACTURE PRESENCE: ICD-10-CM

## 2025-02-05 DIAGNOSIS — R29.6 FREQUENT FALLS: ICD-10-CM

## 2025-02-05 DIAGNOSIS — K21.9 GASTROESOPHAGEAL REFLUX DISEASE, UNSPECIFIED WHETHER ESOPHAGITIS PRESENT: ICD-10-CM

## 2025-02-05 DIAGNOSIS — F33.1 MODERATE EPISODE OF RECURRENT MAJOR DEPRESSIVE DISORDER: ICD-10-CM

## 2025-02-05 LAB
ALBUMIN SERPL BCP-MCNC: 4.1 G/DL (ref 3.4–4.8)
ALBUMIN/GLOB SERPL: 1.6 {RATIO}
ALP SERPL-CCNC: 51 U/L (ref 40–150)
ALT SERPL W P-5'-P-CCNC: 9 U/L
ANION GAP SERPL CALCULATED.3IONS-SCNC: 12 MMOL/L (ref 7–16)
AST SERPL W P-5'-P-CCNC: 13 U/L (ref 5–34)
BASOPHILS # BLD AUTO: 0.07 K/UL (ref 0–0.2)
BASOPHILS NFR BLD AUTO: 0.7 % (ref 0–1)
BILIRUB SERPL-MCNC: 0.3 MG/DL
BUN SERPL-MCNC: 5 MG/DL (ref 10–20)
BUN/CREAT SERPL: 8 (ref 6–20)
CALCIUM SERPL-MCNC: 8.8 MG/DL (ref 8.4–10.2)
CHLORIDE SERPL-SCNC: 100 MMOL/L (ref 98–107)
CHOLEST SERPL-MCNC: 116 MG/DL
CHOLEST/HDLC SERPL: 2.6 {RATIO}
CO2 SERPL-SCNC: 28 MMOL/L (ref 23–31)
CREAT SERPL-MCNC: 0.61 MG/DL (ref 0.55–1.02)
DIFFERENTIAL METHOD BLD: ABNORMAL
EGFR (NO RACE VARIABLE) (RUSH/TITUS): 100 ML/MIN/1.73M2
EOSINOPHIL # BLD AUTO: 0.11 K/UL (ref 0–0.5)
EOSINOPHIL NFR BLD AUTO: 1.1 % (ref 1–4)
ERYTHROCYTE [DISTWIDTH] IN BLOOD BY AUTOMATED COUNT: 12.6 % (ref 11.5–14.5)
GLOBULIN SER-MCNC: 2.5 G/DL (ref 2–4)
GLUCOSE SERPL-MCNC: 75 MG/DL (ref 82–115)
HCT VFR BLD AUTO: 45.5 % (ref 38–47)
HDLC SERPL-MCNC: 44 MG/DL (ref 35–60)
HGB BLD-MCNC: 14.7 G/DL (ref 12–16)
IMM GRANULOCYTES # BLD AUTO: 0.04 K/UL (ref 0–0.04)
IMM GRANULOCYTES NFR BLD: 0.4 % (ref 0–0.4)
LDLC SERPL CALC-MCNC: 59 MG/DL
LDLC/HDLC SERPL: 1.3 {RATIO}
LYMPHOCYTES # BLD AUTO: 2.73 K/UL (ref 1–4.8)
LYMPHOCYTES NFR BLD AUTO: 28.1 % (ref 27–41)
MCH RBC QN AUTO: 30.6 PG (ref 27–31)
MCHC RBC AUTO-ENTMCNC: 32.3 G/DL (ref 32–36)
MCV RBC AUTO: 94.8 FL (ref 80–96)
MONOCYTES # BLD AUTO: 1 K/UL (ref 0–0.8)
MONOCYTES NFR BLD AUTO: 10.3 % (ref 2–6)
MPC BLD CALC-MCNC: 11 FL (ref 9.4–12.4)
NEUTROPHILS # BLD AUTO: 5.76 K/UL (ref 1.8–7.7)
NEUTROPHILS NFR BLD AUTO: 59.4 % (ref 53–65)
NONHDLC SERPL-MCNC: 72 MG/DL
NRBC # BLD AUTO: 0 X10E3/UL
NRBC, AUTO (.00): 0 %
PLATELET # BLD AUTO: 289 K/UL (ref 150–400)
POTASSIUM SERPL-SCNC: 3.6 MMOL/L (ref 3.5–5.1)
PROT SERPL-MCNC: 6.6 G/DL (ref 5.8–7.6)
RBC # BLD AUTO: 4.8 M/UL (ref 4.2–5.4)
SODIUM SERPL-SCNC: 136 MMOL/L (ref 136–145)
TRIGL SERPL-MCNC: 63 MG/DL (ref 37–140)
VLDLC SERPL-MCNC: 13 MG/DL
WBC # BLD AUTO: 9.71 K/UL (ref 4.5–11)

## 2025-02-05 PROCEDURE — 4010F ACE/ARB THERAPY RXD/TAKEN: CPT | Mod: ,,, | Performed by: NURSE PRACTITIONER

## 2025-02-05 PROCEDURE — 85025 COMPLETE CBC W/AUTO DIFF WBC: CPT | Mod: ,,, | Performed by: CLINICAL MEDICAL LABORATORY

## 2025-02-05 PROCEDURE — 3074F SYST BP LT 130 MM HG: CPT | Mod: ,,, | Performed by: NURSE PRACTITIONER

## 2025-02-05 PROCEDURE — 80061 LIPID PANEL: CPT | Mod: ,,, | Performed by: CLINICAL MEDICAL LABORATORY

## 2025-02-05 PROCEDURE — 3078F DIAST BP <80 MM HG: CPT | Mod: ,,, | Performed by: NURSE PRACTITIONER

## 2025-02-05 PROCEDURE — 96372 THER/PROPH/DIAG INJ SC/IM: CPT | Mod: ,,, | Performed by: NURSE PRACTITIONER

## 2025-02-05 PROCEDURE — 99214 OFFICE O/P EST MOD 30 MIN: CPT | Mod: 25,,, | Performed by: NURSE PRACTITIONER

## 2025-02-05 PROCEDURE — 80053 COMPREHEN METABOLIC PANEL: CPT | Mod: ,,, | Performed by: CLINICAL MEDICAL LABORATORY

## 2025-02-05 PROCEDURE — 3008F BODY MASS INDEX DOCD: CPT | Mod: ,,, | Performed by: NURSE PRACTITIONER

## 2025-02-05 RX ORDER — ATORVASTATIN CALCIUM 40 MG/1
40 TABLET, FILM COATED ORAL DAILY
Qty: 90 TABLET | Refills: 3 | Status: SHIPPED | OUTPATIENT
Start: 2025-02-05 | End: 2026-02-05

## 2025-02-05 RX ORDER — OMEPRAZOLE 40 MG/1
40 CAPSULE, DELAYED RELEASE ORAL DAILY
Qty: 90 CAPSULE | Refills: 3 | Status: SHIPPED | OUTPATIENT
Start: 2025-02-05 | End: 2026-02-05

## 2025-02-05 RX ORDER — BETAMETHASONE SODIUM PHOSPHATE AND BETAMETHASONE ACETATE 3; 3 MG/ML; MG/ML
6 INJECTION, SUSPENSION INTRA-ARTICULAR; INTRALESIONAL; INTRAMUSCULAR; SOFT TISSUE
Status: COMPLETED | OUTPATIENT
Start: 2025-02-05 | End: 2025-02-05

## 2025-02-05 RX ORDER — FOLIC ACID 1 MG/1
1 TABLET ORAL DAILY
Qty: 90 TABLET | Refills: 1 | Status: SHIPPED | OUTPATIENT
Start: 2025-02-05 | End: 2026-02-05

## 2025-02-05 RX ORDER — LOSARTAN POTASSIUM 25 MG/1
25 TABLET ORAL DAILY
Qty: 90 TABLET | Refills: 3 | Status: SHIPPED | OUTPATIENT
Start: 2025-02-05 | End: 2026-02-05

## 2025-02-05 RX ORDER — ESCITALOPRAM OXALATE 20 MG/1
20 TABLET ORAL DAILY
Qty: 90 TABLET | Refills: 3 | Status: SHIPPED | OUTPATIENT
Start: 2025-02-05

## 2025-02-05 RX ORDER — ERGOCALCIFEROL 1.25 MG/1
50000 CAPSULE ORAL
Qty: 15 CAPSULE | Refills: 1 | Status: SHIPPED | OUTPATIENT
Start: 2025-02-05

## 2025-02-05 RX ORDER — SULFAMETHOXAZOLE AND TRIMETHOPRIM 800; 160 MG/1; MG/1
1 TABLET ORAL 2 TIMES DAILY
Qty: 14 TABLET | Refills: 0 | Status: SHIPPED | OUTPATIENT
Start: 2025-02-05 | End: 2025-02-12

## 2025-02-05 RX ADMIN — BETAMETHASONE SODIUM PHOSPHATE AND BETAMETHASONE ACETATE 6 MG: 3; 3 INJECTION, SUSPENSION INTRA-ARTICULAR; INTRALESIONAL; INTRAMUSCULAR; SOFT TISSUE at 12:02

## 2025-02-05 NOTE — PROGRESS NOTES
Vanessa Rasmussen DNP   [address] [place] [address]     PATIENT NAME: Tammy Behles  : 1960  DATE: 25  MRN: [id number]      Billing Provider: Vanessa Rasmussen DNP  Level of Service:   Patient PCP Information       Provider PCP Type    Vanessa Rasmussen DNP General            Reason for Visit / Chief Complaint: Follow-up and Hypertension       Update PCP  Update Chief Complaint         History of Present Illness / Problem Focused Workflow     Tammy Behles presents to the clinic with Follow-up and Hypertension     HPI    Review of Systems     Review of Systems     Medical / Social / Family History     Past Medical History:   Diagnosis Date    Depression     Hyperlipidemia     Hypertension     Neuropathy     Osteoporosis        Past Surgical History:   Procedure Laterality Date     SECTION      x2    ILEOSTOMY      LIVER SURGERY         Social History  Ms.  reports that she has been smoking cigarettes. She started smoking about 40 years ago. She has a 20.1 pack-year smoking history. She has been exposed to tobacco smoke. She has never used smokeless tobacco. She reports that she does not currently use alcohol. She reports current drug use. Drug: Marijuana.    Family History  Ms.'s family history includes Cancer in her father, mother, and sister; Diverticulitis in her mother; Hypertension in her father; No Known Problems in her sister; Ulcerative colitis in her brother.    Medications and Allergies     Medications  Outpatient Medications Marked as Taking for the 25 encounter (Office Visit) with Vanessa Rasmussen DNP   Medication Sig Dispense Refill    busPIRone (BUSPAR) 10 MG tablet Take 1 tablet (10 mg total) by mouth 3 (three) times daily. 270 tablet 3    gabapentin (NEURONTIN) 100 MG capsule Take 2 capsules (200 mg total) by mouth 3 (three) times daily. 180 capsule 2    [DISCONTINUED] atorvastatin (LIPITOR) 40 MG tablet Take 1 tablet (40 mg total) by mouth  "once daily. 90 tablet 3    [DISCONTINUED] denosumab (PROLIA) 60 mg/mL Syrg Inject 1 mL (60 mg total) into the skin every 6 (six) months. 2 mL 1    [DISCONTINUED] EScitalopram oxalate (LEXAPRO) 20 MG tablet Take 1 tablet (20 mg total) by mouth once daily. 90 tablet 3    [DISCONTINUED] folic acid (FOLVITE) 1 MG tablet Take 1 tablet (1 mg total) by mouth once daily. 90 tablet 1    [DISCONTINUED] losartan (COZAAR) 25 MG tablet Take 1 tablet (25 mg total) by mouth once daily. 90 tablet 3    [DISCONTINUED] omeprazole (PRILOSEC) 40 MG capsule Take 1 capsule (40 mg total) by mouth once daily. 90 capsule 3    [DISCONTINUED] tiZANidine (ZANAFLEX) 4 MG tablet TAKE 1 TABLET (4 MG TOTAL) BY MOUTH EVERY 8 (EIGHT) HOURS. 20 tablet 0       Allergies  Review of patient's allergies indicates:   Allergen Reactions    Keflex [cephalexin] Itching and Rash       Physical Examination   /63 (BP Location: Right arm, Patient Position: Sitting)   Pulse 95   Temp 98.4 °F (36.9 °C) (Oral)   Ht 5' 6" (1.676 m)   Wt 58.2 kg (128 lb 6.4 oz)   SpO2 96%   BMI 20.72 kg/m²    Physical Exam  Vitals and nursing note reviewed.   Constitutional:       Appearance: Normal appearance. She is normal weight.   HENT:      Head: Normocephalic.      Nose: Congestion and rhinorrhea present.      Mouth/Throat:      Mouth: Mucous membranes are moist.   Eyes:      Pupils: Pupils are equal, round, and reactive to light.   Cardiovascular:      Rate and Rhythm: Normal rate and regular rhythm.      Pulses: Normal pulses.      Heart sounds: Normal heart sounds.   Pulmonary:      Effort: Pulmonary effort is normal.      Breath sounds: Normal breath sounds.   Abdominal:      General: Abdomen is flat. Bowel sounds are normal.      Palpations: Abdomen is soft.      Comments: Scarring rt mid abd from ileostomy reversal.   Musculoskeletal:      Cervical back: Normal range of motion and neck supple.      Comments: Cane and partner to assist with walking and " "getting around.   Skin:     General: Skin is warm and dry.      Capillary Refill: Capillary refill takes less than 2 seconds.   Neurological:      General: No focal deficit present.      Mental Status: She is alert and oriented to person, place, and time. Mental status is at baseline.      Motor: Weakness present.      Coordination: Coordination abnormal.      Gait: Gait abnormal.   Psychiatric:         Mood and Affect: Mood normal.         Behavior: Behavior normal.         Thought Content: Thought content normal.         Judgment: Judgment normal.        Assessment and Plan (including Health Maintenance)      Problem List  Smart Sets  Document Outside HM   :    Plan:     Last office visit - 10/2024   increase buspar dosage - she states when she takes the 5mg she does get some relief but feels it wears off quickly. Increased to 10mg tid tolerating well  Labs today   Mmg - 2022 due now. Pt aware and has missed several appts.   Cologuard - 5/2023  Had a colon abscess went to [place] and was transferred to [facility] and had emergency surgery with ileostomy and then had reversal done nearly a year later.. 2020?      Pap - 10/2022  Smokes 1 ppd x15 years. Currently quit      Unable to walk without cane and assistance  Pcv20 2/2023  Flu vax 2024     Needs tdap at pharmacy. Pt aware  Refill meds  Labs today  Will repeat sodium levels - hyponatremia for some time. Saw neuro dr valencia in [facility] and felt more endo related. Endo referral placed for [facility] but no appt has been made yet  Questionable white matter on brain MRI and did genetic test which she states was inconclusive. Severe weakness has progressed despite therapy, calcium/vit d supplements, prolia injections, no smoking and avoiding alcohol. States weakness is worse in which she feels she could "crumble"     LCDT due now - but missed appt states will get rescheduled. They are down to 1 vehicle and her son is working now having to use it   Vs stable.  [facility] of questionable MS and treatment        Health Maintenance Due   Topic Date Due    Shingles Vaccine (1 of 2) Never done    RSV Vaccine (Age 60+ and Pregnant patients) (1 - Risk 60-74 years 1-dose series) Never done    Mammogram  10/13/2023    COVID-19 Vaccine (1 - 2024-25 season) Never done    LDCT Lung Screen  11/21/2024       Problem List Items Addressed This Visit          Neuro    White matter abnormality on MRI of brain - Primary    Relevant Orders    Ambulatory referral/consult to Neurology       Psychiatric    Moderate episode of recurrent major depressive disorder       Pulmonary    Other emphysema    Solitary pulmonary nodule       Cardiac/Vascular    Encounter for screening for cardiovascular disorders    Relevant Orders    Lipid Panel (Completed)    Hypertension    Relevant Orders    Comprehensive Metabolic Panel (Completed)    CBC Auto Differential (Completed)       Endocrine    BMI 22.0-22.9, adult    Hyponatremia    Relevant Orders    Aldosterone    Cortisol, AM    Osteoporosis    Relevant Medications    denosumab (PROLIA) 60 mg/mL Syrg    ergocalciferol (ERGOCALCIFEROL) 50,000 unit Cap       GI    Gastroesophageal reflux disease    Relevant Medications    omeprazole (PRILOSEC) 40 MG capsule       Other    Abnormality of gait and mobility    Frequent falls    Relevant Orders    Ambulatory referral/consult to Neurology    Other reduced mobility    Relevant Orders    Ambulatory referral/consult to Neurology       Health Maintenance Topics with due status: Not Due       Topic Last Completion Date    Cervical Cancer Screening 10/13/2022    Colorectal Cancer Screening 05/07/2023    TETANUS VACCINE 09/18/2024    High Dose Statin 02/05/2025    Lipid Panel 02/05/2025       Future Appointments   Date Time Provider Department Center   8/26/2025  3:00 PM AWSHRUTI NURSE, [facility] [place]            Signature:  Vanessa Rasmussen, YEYO      [address] [place] [address]    Date of encounter: 2/5/25

## 2025-02-06 RX ORDER — TIZANIDINE 4 MG/1
4 TABLET ORAL EVERY 8 HOURS
Qty: 20 TABLET | Refills: 0 | Status: SHIPPED | OUTPATIENT
Start: 2025-02-06

## 2025-02-07 PROBLEM — R91.1 SOLITARY PULMONARY NODULE: Status: ACTIVE | Noted: 2025-02-07

## 2025-02-07 PROBLEM — K21.9 GASTROESOPHAGEAL REFLUX DISEASE: Status: ACTIVE | Noted: 2025-02-07

## 2025-02-07 PROBLEM — F33.1 MODERATE EPISODE OF RECURRENT MAJOR DEPRESSIVE DISORDER: Status: ACTIVE | Noted: 2025-02-07

## 2025-02-11 ENCOUNTER — TELEPHONE (OUTPATIENT)
Dept: FAMILY MEDICINE | Facility: CLINIC | Age: 65
End: 2025-02-11
Payer: MEDICARE

## 2025-02-11 NOTE — TELEPHONE ENCOUNTER
----- Message from Eufemia sent at 2/11/2025  8:58 AM CST -----  Regarding: Results  Who Called: Tamela Behdevonte    Caller is requesting information on test results.    Name of Test (Lab/Mammo/Etc): Labwork  Date of Test: Last Week  Where the test was performed:   Ordering Provider: Vanessa Rasmussen    Preferred Method of Contact: Phone Call    Best Call Back Number, if different: [phone]  Additional Information:

## 2025-02-11 NOTE — TELEPHONE ENCOUNTER
The patient will have to come back in to get more bloodwork. I spoke with ChuBrunilda on the phone from outreach

## 2025-02-12 NOTE — TELEPHONE ENCOUNTER
María Elena spoke to [facility] lab and pt will have to have these collected they are unable to run them

## 2025-02-13 ENCOUNTER — TELEPHONE (OUTPATIENT)
Dept: FAMILY MEDICINE | Facility: CLINIC | Age: 65
End: 2025-02-13
Payer: MEDICARE

## 2025-02-13 NOTE — TELEPHONE ENCOUNTER
----- Message from Rossy sent at 2/13/2025  9:51 AM CST -----  Regarding: lab results  Please call [phone] with results

## 2025-02-13 NOTE — TELEPHONE ENCOUNTER
Attempted to call back no answer not able to leave message vm not setup     Some of the labs were not collected pt needs to come to clinic to have collected   Today no labs available after 12 and can come tomorrow before 12

## 2025-04-28 ENCOUNTER — OFFICE VISIT (OUTPATIENT)
Dept: FAMILY MEDICINE | Facility: CLINIC | Age: 65
End: 2025-04-28
Payer: MEDICARE

## 2025-04-28 VITALS
HEIGHT: 66 IN | BODY MASS INDEX: 21.24 KG/M2 | SYSTOLIC BLOOD PRESSURE: 136 MMHG | TEMPERATURE: 98 F | OXYGEN SATURATION: 96 % | HEART RATE: 79 BPM | DIASTOLIC BLOOD PRESSURE: 86 MMHG | WEIGHT: 132.19 LBS

## 2025-04-28 DIAGNOSIS — E07.9 THYROID DISORDER: ICD-10-CM

## 2025-04-28 DIAGNOSIS — R26.9 ABNORMALITY OF GAIT AND MOBILITY: ICD-10-CM

## 2025-04-28 DIAGNOSIS — D50.8 IRON DEFICIENCY ANEMIA SECONDARY TO INADEQUATE DIETARY IRON INTAKE: ICD-10-CM

## 2025-04-28 DIAGNOSIS — Z12.2 ENCOUNTER FOR SCREENING FOR LUNG CANCER: ICD-10-CM

## 2025-04-28 DIAGNOSIS — G62.9 NEUROPATHY: ICD-10-CM

## 2025-04-28 DIAGNOSIS — Z79.899 OTHER LONG TERM (CURRENT) DRUG THERAPY: ICD-10-CM

## 2025-04-28 DIAGNOSIS — M21.371 FOOT DROP, BILATERAL: ICD-10-CM

## 2025-04-28 DIAGNOSIS — M54.9 BACK PAIN, UNSPECIFIED BACK LOCATION, UNSPECIFIED BACK PAIN LATERALITY, UNSPECIFIED CHRONICITY: ICD-10-CM

## 2025-04-28 DIAGNOSIS — R90.82 WHITE MATTER ABNORMALITY ON MRI OF BRAIN: ICD-10-CM

## 2025-04-28 DIAGNOSIS — M81.0 OSTEOPOROSIS, UNSPECIFIED OSTEOPOROSIS TYPE, UNSPECIFIED PATHOLOGICAL FRACTURE PRESENCE: ICD-10-CM

## 2025-04-28 DIAGNOSIS — Z72.0 TOBACCO USE: ICD-10-CM

## 2025-04-28 DIAGNOSIS — F33.1 MODERATE EPISODE OF RECURRENT MAJOR DEPRESSIVE DISORDER: ICD-10-CM

## 2025-04-28 DIAGNOSIS — E55.9 VITAMIN D DEFICIENCY, UNSPECIFIED: ICD-10-CM

## 2025-04-28 DIAGNOSIS — E87.1 HYPONATREMIA: ICD-10-CM

## 2025-04-28 DIAGNOSIS — Z12.31 SCREENING MAMMOGRAM FOR BREAST CANCER: ICD-10-CM

## 2025-04-28 DIAGNOSIS — R73.9 HYPERGLYCEMIA: ICD-10-CM

## 2025-04-28 DIAGNOSIS — I10 PRIMARY HYPERTENSION: Primary | ICD-10-CM

## 2025-04-28 DIAGNOSIS — Z74.09 OTHER REDUCED MOBILITY: ICD-10-CM

## 2025-04-28 DIAGNOSIS — Z13.6 ENCOUNTER FOR SCREENING FOR CARDIOVASCULAR DISORDERS: ICD-10-CM

## 2025-04-28 DIAGNOSIS — M25.551 RIGHT HIP PAIN: ICD-10-CM

## 2025-04-28 DIAGNOSIS — F17.213 NICOTINE DEPENDENCE, CIGARETTES, WITH WITHDRAWAL: ICD-10-CM

## 2025-04-28 DIAGNOSIS — M21.372 FOOT DROP, BILATERAL: ICD-10-CM

## 2025-04-28 PROBLEM — J43.8 OTHER EMPHYSEMA: Status: RESOLVED | Noted: 2023-07-17 | Resolved: 2025-04-28

## 2025-04-28 PROBLEM — T81.41XA INFECTION FOLLOWING A PROCEDURE, SUPERFICIAL INCISIONAL SURGICAL SITE, INITIAL ENCOUNTER: Status: RESOLVED | Noted: 2022-09-29 | Resolved: 2025-04-28

## 2025-04-28 PROBLEM — R19.7 DIARRHEA: Status: RESOLVED | Noted: 2023-07-17 | Resolved: 2025-04-28

## 2025-04-28 PROBLEM — J40 BRONCHITIS: Status: RESOLVED | Noted: 2023-10-18 | Resolved: 2025-04-28

## 2025-04-28 PROBLEM — M54.50 ACUTE BILATERAL LOW BACK PAIN WITHOUT SCIATICA: Status: RESOLVED | Noted: 2022-11-30 | Resolved: 2025-04-28

## 2025-04-28 PROBLEM — F17.210 NICOTINE DEPENDENCE, CIGARETTES, UNCOMPLICATED: Status: RESOLVED | Noted: 2023-05-02 | Resolved: 2025-04-28

## 2025-04-28 PROBLEM — Z12.4 SCREENING FOR MALIGNANT NEOPLASM OF CERVIX: Status: RESOLVED | Noted: 2022-10-13 | Resolved: 2025-04-28

## 2025-04-28 PROBLEM — J02.9 SORE THROAT: Status: RESOLVED | Noted: 2023-10-18 | Resolved: 2025-04-28

## 2025-04-28 PROBLEM — R41.3 OTHER AMNESIA: Status: RESOLVED | Noted: 2023-05-02 | Resolved: 2025-04-28

## 2025-04-28 PROBLEM — R05.9 COUGH: Status: RESOLVED | Noted: 2023-10-18 | Resolved: 2025-04-28

## 2025-04-28 PROBLEM — R20.0 ANESTHESIA OF SKIN: Status: RESOLVED | Noted: 2023-05-02 | Resolved: 2025-04-28

## 2025-04-28 PROBLEM — R22.1 MASS OF RIGHT SIDE OF NECK: Status: RESOLVED | Noted: 2022-10-13 | Resolved: 2025-04-28

## 2025-04-28 PROBLEM — R10.9 ABDOMINAL PAIN: Status: RESOLVED | Noted: 2023-07-17 | Resolved: 2025-04-28

## 2025-04-28 PROBLEM — R51.9 INTRACTABLE HEADACHE: Status: RESOLVED | Noted: 2023-05-02 | Resolved: 2025-04-28

## 2025-04-28 PROBLEM — K80.20 GALL STONES: Status: RESOLVED | Noted: 2024-04-22 | Resolved: 2025-04-28

## 2025-04-28 PROBLEM — R42 DIZZY SPELLS: Status: RESOLVED | Noted: 2023-05-02 | Resolved: 2025-04-28
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
## 2025-04-28 PROBLEM — R29.6 FREQUENT FALLS: Status: RESOLVED | Noted: 2023-05-02 | Resolved: 2025-04-28

## 2025-04-28 PROBLEM — K21.9 GASTROESOPHAGEAL REFLUX DISEASE: Status: RESOLVED | Noted: 2025-02-07 | Resolved: 2025-04-28

## 2025-04-28 PROBLEM — K80.10 CALCULUS OF GALLBLADDER WITH CHRONIC CHOLECYSTITIS WITHOUT OBSTRUCTION: Status: RESOLVED | Noted: 2024-04-22 | Resolved: 2025-04-28

## 2025-04-28 PROBLEM — L98.9 SKIN LESION: Status: RESOLVED | Noted: 2022-09-21 | Resolved: 2025-04-28

## 2025-04-28 PROBLEM — M77.50 TENDONITIS OF ANKLE OR FOOT: Status: RESOLVED | Noted: 2023-09-12 | Resolved: 2025-04-28

## 2025-04-28 PROBLEM — J01.00 ACUTE NON-RECURRENT MAXILLARY SINUSITIS: Status: RESOLVED | Noted: 2024-08-14 | Resolved: 2025-04-28

## 2025-04-28 PROBLEM — R16.0 HEPATOMEGALY, NOT ELSEWHERE CLASSIFIED: Status: RESOLVED | Noted: 2024-04-22 | Resolved: 2025-04-28

## 2025-04-28 LAB
25(OH)D3 SERPL-MCNC: 13.9 NG/ML (ref 30–80)
ALBUMIN SERPL BCP-MCNC: 4.3 G/DL (ref 3.4–4.8)
ALBUMIN/GLOB SERPL: 1.4 {RATIO}
ALP SERPL-CCNC: 58 U/L (ref 40–150)
ALT SERPL W P-5'-P-CCNC: 7 U/L
ANION GAP SERPL CALCULATED.3IONS-SCNC: 16 MMOL/L (ref 7–16)
AST SERPL W P-5'-P-CCNC: 16 U/L (ref 11–45)
BASOPHILS # BLD AUTO: 0.04 K/UL (ref 0–0.2)
BASOPHILS NFR BLD AUTO: 0.5 % (ref 0–1)
BILIRUB SERPL-MCNC: 0.5 MG/DL
BUN SERPL-MCNC: 5 MG/DL (ref 10–20)
BUN/CREAT SERPL: 8 (ref 6–20)
CALCIUM SERPL-MCNC: 8.8 MG/DL (ref 8.4–10.2)
CHLORIDE SERPL-SCNC: 98 MMOL/L (ref 98–107)
CHOLEST SERPL-MCNC: 163 MG/DL
CHOLEST/HDLC SERPL: 4.1 {RATIO}
CO2 SERPL-SCNC: 25 MMOL/L (ref 23–31)
CORTIS AM PEAK SERPL-MCNC: 4.3 ΜG/DL (ref 4.5–22.7)
CREAT SERPL-MCNC: 0.63 MG/DL (ref 0.55–1.02)
DIFFERENTIAL METHOD BLD: ABNORMAL
EGFR (NO RACE VARIABLE) (RUSH/TITUS): 99 ML/MIN/1.73M2
EOSINOPHIL # BLD AUTO: 0.09 K/UL (ref 0–0.5)
EOSINOPHIL NFR BLD AUTO: 1.2 % (ref 1–4)
ERYTHROCYTE [DISTWIDTH] IN BLOOD BY AUTOMATED COUNT: 12.8 % (ref 11.5–14.5)
EST. AVERAGE GLUCOSE BLD GHB EST-MCNC: 91 MG/DL
FOLATE SERPL-MCNC: 6.4 NG/ML (ref 7–31.4)
GLOBULIN SER-MCNC: 3 G/DL (ref 2–4)
GLUCOSE SERPL-MCNC: 80 MG/DL (ref 82–115)
HBA1C MFR BLD HPLC: 4.8 %
HCT VFR BLD AUTO: 42 % (ref 38–47)
HDLC SERPL-MCNC: 40 MG/DL (ref 35–60)
HGB BLD-MCNC: 13.9 G/DL (ref 12–16)
IMM GRANULOCYTES # BLD AUTO: 0.01 K/UL (ref 0–0.04)
IMM GRANULOCYTES NFR BLD: 0.1 % (ref 0–0.4)
LDLC SERPL CALC-MCNC: 102 MG/DL
LDLC/HDLC SERPL: 2.6 {RATIO}
LYMPHOCYTES # BLD AUTO: 2.47 K/UL (ref 1–4.8)
LYMPHOCYTES NFR BLD AUTO: 32.7 % (ref 27–41)
MCH RBC QN AUTO: 31 PG (ref 27–31)
MCHC RBC AUTO-ENTMCNC: 33.1 G/DL (ref 32–36)
MCV RBC AUTO: 93.5 FL (ref 80–96)
MONOCYTES # BLD AUTO: 0.63 K/UL (ref 0–0.8)
MONOCYTES NFR BLD AUTO: 8.3 % (ref 2–6)
MPC BLD CALC-MCNC: 11.3 FL (ref 9.4–12.4)
NEUTROPHILS # BLD AUTO: 4.32 K/UL (ref 1.8–7.7)
NEUTROPHILS NFR BLD AUTO: 57.2 % (ref 53–65)
NONHDLC SERPL-MCNC: 123 MG/DL
NRBC # BLD AUTO: 0 X10E3/UL
NRBC, AUTO (.00): 0 %
PLATELET # BLD AUTO: 223 K/UL (ref 150–400)
POTASSIUM SERPL-SCNC: 4.5 MMOL/L (ref 3.5–5.1)
PROT SERPL-MCNC: 7.3 G/DL (ref 5.8–7.6)
RBC # BLD AUTO: 4.49 M/UL (ref 4.2–5.4)
SODIUM SERPL-SCNC: 134 MMOL/L (ref 136–145)
T4 FREE SERPL-MCNC: 0.83 NG/DL (ref 0.7–1.48)
TRIGL SERPL-MCNC: 105 MG/DL (ref 37–140)
TSH SERPL DL<=0.005 MIU/L-ACNC: 0.19 UIU/ML (ref 0.35–4.94)
VIT B12 SERPL-MCNC: 419 PG/ML (ref 213–816)
VLDLC SERPL-MCNC: 21 MG/DL
WBC # BLD AUTO: 7.56 K/UL (ref 4.5–11)

## 2025-04-28 PROCEDURE — G2211 COMPLEX E/M VISIT ADD ON: HCPCS | Mod: ,,, | Performed by: NURSE PRACTITIONER

## 2025-04-28 PROCEDURE — 82607 VITAMIN B-12: CPT | Mod: ,,, | Performed by: CLINICAL MEDICAL LABORATORY

## 2025-04-28 PROCEDURE — 4010F ACE/ARB THERAPY RXD/TAKEN: CPT | Mod: ,,, | Performed by: NURSE PRACTITIONER

## 2025-04-28 PROCEDURE — 3008F BODY MASS INDEX DOCD: CPT | Mod: ,,, | Performed by: NURSE PRACTITIONER

## 2025-04-28 PROCEDURE — 80050 GENERAL HEALTH PANEL: CPT | Mod: ,,, | Performed by: CLINICAL MEDICAL LABORATORY

## 2025-04-28 PROCEDURE — 82746 ASSAY OF FOLIC ACID SERUM: CPT | Mod: ,,, | Performed by: CLINICAL MEDICAL LABORATORY

## 2025-04-28 PROCEDURE — 3075F SYST BP GE 130 - 139MM HG: CPT | Mod: ,,, | Performed by: NURSE PRACTITIONER

## 2025-04-28 PROCEDURE — 3288F FALL RISK ASSESSMENT DOCD: CPT | Mod: ,,, | Performed by: NURSE PRACTITIONER

## 2025-04-28 PROCEDURE — 1101F PT FALLS ASSESS-DOCD LE1/YR: CPT | Mod: ,,, | Performed by: NURSE PRACTITIONER

## 2025-04-28 PROCEDURE — 83036 HEMOGLOBIN GLYCOSYLATED A1C: CPT | Mod: ,,, | Performed by: CLINICAL MEDICAL LABORATORY

## 2025-04-28 PROCEDURE — 1159F MED LIST DOCD IN RCRD: CPT | Mod: ,,, | Performed by: NURSE PRACTITIONER

## 2025-04-28 PROCEDURE — 3079F DIAST BP 80-89 MM HG: CPT | Mod: ,,, | Performed by: NURSE PRACTITIONER

## 2025-04-28 PROCEDURE — 84439 ASSAY OF FREE THYROXINE: CPT | Mod: ,,, | Performed by: CLINICAL MEDICAL LABORATORY

## 2025-04-28 PROCEDURE — 82306 VITAMIN D 25 HYDROXY: CPT | Mod: ,,, | Performed by: CLINICAL MEDICAL LABORATORY

## 2025-04-28 PROCEDURE — 80061 LIPID PANEL: CPT | Mod: ,,, | Performed by: CLINICAL MEDICAL LABORATORY

## 2025-04-28 PROCEDURE — 99215 OFFICE O/P EST HI 40 MIN: CPT | Mod: ,,, | Performed by: NURSE PRACTITIONER

## 2025-04-28 PROCEDURE — 82533 TOTAL CORTISOL: CPT | Mod: ,,, | Performed by: CLINICAL MEDICAL LABORATORY

## 2025-04-28 RX ORDER — METHOCARBAMOL 500 MG/1
500 TABLET, FILM COATED ORAL 2 TIMES DAILY PRN
Qty: 180 TABLET | Refills: 1 | Status: SHIPPED | OUTPATIENT
Start: 2025-04-28

## 2025-04-28 RX ORDER — PREGABALIN 75 MG/1
75 CAPSULE ORAL NIGHTLY
Qty: 30 CAPSULE | Refills: 2 | Status: SHIPPED | OUTPATIENT
Start: 2025-04-28 | End: 2025-10-27

## 2025-04-28 NOTE — PROGRESS NOTES
Vanessa Rasmussen DNP   [address] [place] [address]     PATIENT NAME: Tammy Behles  : 1960  DATE: 25  MRN: [id number]      Billing Provider: Vanessa Rasmussen DNP  Level of Service: LA OFFICE/OUTPT VISIT, JOSE M KUMAR, 40-54 MIN  Patient PCP Information       Provider PCP Type    Vanessa Rasmussen DNP General            Reason for Visit / Chief Complaint: Follow-up, Hypertension, and Hyperlipidemia       Update PCP  Update Chief Complaint         History of Present Illness / Problem Focused Workflow     Tammy Behles presents to the clinic with Follow-up, Hypertension, and Hyperlipidemia     Follow-up    Hypertension    Hyperlipidemia    Review of Systems     Review of Systems     Medical / Social / Family History     Past Medical History:   Diagnosis Date    Depression     Hyperlipidemia     Hypertension     Neuropathy     Osteoporosis        Past Surgical History:   Procedure Laterality Date     SECTION      x2    ILEOSTOMY      LIVER SURGERY         Social History  Ms.  reports that she has been smoking cigarettes. She started smoking about 40 years ago. She has a 20.2 pack-year smoking history. She has been exposed to tobacco smoke. She has never used smokeless tobacco. She reports that she does not currently use alcohol. She reports current drug use. Drug: Marijuana.    Family History  Ms.'s family history includes Cancer in her father, mother, and sister; Diverticulitis in her mother; Hypertension in her father; No Known Problems in her sister; Ulcerative colitis in her brother.    Medications and Allergies     Medications  No outpatient medications have been marked as taking for the 25 encounter (Office Visit) with Vanessa Rasmussen DNP.       Allergies  Review of patient's allergies indicates:   Allergen Reactions    Keflex [cephalexin] Itching and Rash       Physical Examination   /86 (BP Location: Left arm, Patient Position: Sitting)   Pulse 79  "  Temp 98 °F (36.7 °C)   Ht 5' 6" (1.676 m)   Wt 60 kg (132 lb 3.2 oz)   SpO2 96%   BMI 21.34 kg/m²    Physical Exam  Vitals and nursing note reviewed.   Constitutional:       Appearance: Normal appearance. She is normal weight.   HENT:      Head: Normocephalic.      Mouth/Throat:      Mouth: Mucous membranes are moist.   Eyes:      Pupils: Pupils are equal, round, and reactive to light.   Cardiovascular:      Rate and Rhythm: Normal rate and regular rhythm.      Pulses: Normal pulses.      Heart sounds: Normal heart sounds.   Pulmonary:      Effort: Pulmonary effort is normal.      Breath sounds: Normal breath sounds.   Abdominal:      General: Abdomen is flat. Bowel sounds are normal.      Palpations: Abdomen is soft.      Comments: Scarring rt mid abd from ileostomy reversal.   Musculoskeletal:         General: Tenderness present. No signs of injury.      Cervical back: Normal range of motion and neck supple.      Comments: Cane and partner to assist with walking and getting around.   Skin:     General: Skin is warm and dry.      Capillary Refill: Capillary refill takes less than 2 seconds.   Neurological:      General: No focal deficit present.      Mental Status: She is alert and oriented to person, place, and time. Mental status is at baseline.      Motor: Weakness present.      Coordination: Coordination abnormal.      Gait: Gait abnormal.   Psychiatric:         Mood and Affect: Mood normal.         Behavior: Behavior normal.         Thought Content: Thought content normal.         Judgment: Judgment normal.        Assessment and Plan (including Health Maintenance)      Problem List  Smart Sets  Document Outside HM   :    Plan:     Last office visit - 2/2025    Labs today   Mmg - 2022 due now. Pt aware and has missed several appts. reordered   Cologuard - 5/2023  Had a colon abscess went to [place] and was transferred to [facility] and had emergency surgery with ileostomy and then had reversal done nearly a " "year later.. 2020?      Pap - 10/2022  Smokes 1 ppd x15 years.     Unable to walk without cane and assistance  Pcv20 2/2023  Flu vax 2024     Refill meds  Labs today  Will repeat sodium levels - hyponatremia for some time. Saw neuro dr valencia in [facility] and felt more endo related. Endo referral placed for [place] but no appt has been made yet  Questionable white matter on brain MRI and did genetic test which she states was inconclusive. Severe weakness has progressed despite therapy, calcium/vit d supplements, prolia injections, no smoking and avoiding alcohol. States weakness is worse in which she feels she could "crumble"    LCDT due now - but missed appt states will get rescheduled. They are down to 1 vehicle and her son is working now having to use it   Vs stable.  Send to neuro at [facility] for further eval of questionable MS and treatment - hasn't been back in months due to family stressors and situations  States will try and get back to appt    Refill meds  Chronic back pain and pain to rt hip with numbness and tingling to rt leg. Requesting xrays -   Will do today  Bp okay.          Health Maintenance Due   Topic Date Due    Shingles Vaccine (1 of 2) Never done    RSV Vaccine (Age 60+ and Pregnant patients) (1 - Risk 60-74 years 1-dose series) Never done    Mammogram  10/13/2023    COVID-19 Vaccine (1 - 2024-25 season) Never done    LDCT Lung Screen  11/21/2024       Problem List Items Addressed This Visit          Neuro    Foot drop, bilateral    Neuropathy    Relevant Medications    pregabalin (LYRICA) 75 MG capsule    White matter abnormality on MRI of brain    Relevant Medications    pregabalin (LYRICA) 75 MG capsule       Psychiatric    Moderate episode of recurrent major depressive disorder       Cardiac/Vascular    Hypertension - Primary       Renal/    Screening mammogram for breast cancer    Relevant Orders    Mammo Digital Screening Bilat w/ Rowdy (XPD)       Oncology    Iron deficiency anemia " secondary to inadequate dietary iron intake    Relevant Orders    Vitamin D    Vitamin B12 & Folate    Thyroid Panel    CBC Auto Differential    Comprehensive Metabolic Panel       Endocrine    BMI 22.0-22.9, adult    Hyperglycemia    Relevant Orders    Hemoglobin A1C    Hyponatremia    Relevant Orders    CBC Auto Differential    Comprehensive Metabolic Panel    Osteoporosis    Thyroid disorder    Vitamin D deficiency, unspecified    Relevant Orders    Vitamin D    Vitamin B12 & Folate    Thyroid Panel       Orthopedic    Back pain    Relevant Orders    X-Ray Lumbar Spine AP And Lateral    X-Ray Thoracic Spine AP Lateral    Right hip pain    Relevant Orders    X-Ray Hip 2 or 3 views Right with Pelvis when performed       Palliative Care    Other long term (current) drug therapy    Relevant Orders    Vitamin D    Vitamin B12 & Folate    Thyroid Panel       Other    Abnormality of gait and mobility    Relevant Orders    X-Ray Lumbar Spine AP And Lateral    X-Ray Thoracic Spine AP Lateral    X-Ray Hip 2 or 3 views Right with Pelvis when performed    Nicotine dependence, cigarettes, with withdrawal    Relevant Orders    CT Chest Lung Screening Low Dose    Other reduced mobility    Tobacco use    Relevant Orders    CT Chest Lung Screening Low Dose       Health Maintenance Topics with due status: Not Due       Topic Last Completion Date    DEXA Scan 11/08/2022    Colorectal Cancer Screening 05/07/2023    TETANUS VACCINE 09/18/2024    High Dose Statin 02/05/2025    Lipid Panel 02/05/2025       Future Appointments   Date Time Provider Department Center   8/26/2025  3:00 PM AWV NURSE, [facility] [place] [place]            Signature:  Vanessa Rasmussen, YEYO      [address] [place] [address]    Date of encounter: 4/28/25

## 2025-04-30 ENCOUNTER — RESULTS FOLLOW-UP (OUTPATIENT)
Dept: FAMILY MEDICINE | Facility: CLINIC | Age: 65
End: 2025-04-30
Payer: MEDICARE

## 2025-04-30 NOTE — PROGRESS NOTES
Add ACTH level and refer to nephro. For adrenal insuff and hyponatremia. Lab should be able to add lab or call over there to add it. thanks

## 2025-05-01 LAB — ALDOST SERPL-MCNC: <4 NG/DL

## 2025-05-02 ENCOUNTER — TELEPHONE (OUTPATIENT)
Dept: FAMILY MEDICINE | Facility: CLINIC | Age: 65
End: 2025-05-02
Payer: MEDICARE

## 2025-05-02 NOTE — TELEPHONE ENCOUNTER
----- Message from PEARL Richardson sent at 5/2/2025  8:44 AM CDT -----    ----- Message -----  From: Kaci Garcia  Sent: 5/1/2025   1:58 PM CDT  To: [facility] Staff    Hello, Patient is calling and asking to be called with her results. Please give her a call at [phone].Thank you, Kaci [facility] also [facility] Ref[facility], [facility] can not see her until January 12, 2026 @ 1:00pm and she has put that on hold until she hears from you.

## 2025-05-02 NOTE — PROGRESS NOTES
Spoke with patient to let her know we needed another lab drawn, would get referrals to nephrology scheduled and check on neurology referral, and go over xrays and labs. Patient coming Monday as nurse visit.

## 2025-05-02 NOTE — TELEPHONE ENCOUNTER
Spoke with patient to let her know we needed another lab drawn, would schedule referral to nephrology and check about neuro referral, and go over xrays and labs. Patient coming Monday as a nurse visit.

## 2025-05-05 ENCOUNTER — CLINICAL SUPPORT (OUTPATIENT)
Dept: FAMILY MEDICINE | Facility: CLINIC | Age: 65
End: 2025-05-05
Payer: MEDICARE

## 2025-05-05 DIAGNOSIS — R53.83 FATIGUE, UNSPECIFIED TYPE: ICD-10-CM

## 2025-05-05 DIAGNOSIS — E07.9 THYROID DISORDER: Primary | ICD-10-CM

## 2025-05-05 DIAGNOSIS — E53.8 B12 DEFICIENCY: ICD-10-CM

## 2025-05-05 PROCEDURE — 96372 THER/PROPH/DIAG INJ SC/IM: CPT | Mod: ,,, | Performed by: NURSE PRACTITIONER

## 2025-05-05 RX ORDER — CYANOCOBALAMIN 1000 UG/ML
1000 INJECTION, SOLUTION INTRAMUSCULAR; SUBCUTANEOUS
Status: COMPLETED | OUTPATIENT
Start: 2025-05-05 | End: 2025-05-05

## 2025-05-05 RX ADMIN — CYANOCOBALAMIN 1000 MCG: 1000 INJECTION, SOLUTION INTRAMUSCULAR; SUBCUTANEOUS at 11:05

## 2025-05-07 LAB — ACTH PLAS-MCNC: 22 PG/ML

## 2025-05-08 ENCOUNTER — TELEPHONE (OUTPATIENT)
Dept: FAMILY MEDICINE | Facility: CLINIC | Age: 65
End: 2025-05-08
Payer: MEDICARE

## 2025-05-08 NOTE — TELEPHONE ENCOUNTER
Rec'd call from Geraldine Villalba sister of patient     She is listed as Emergency contact re: test results.  She stated she is coming up to help her sister     She did 3 way call with sister re: issues going on at this time  Mrs Villalba rec'd call at 4am and stated Tamela is in a bad shape and they are getting her some mental help today    They have arranged facility to take her to but she wanted to know if she had labs or health issues which could be causing the mental health issues     They are aware of the white matter dz on MRI because she took her to the doctor's appt.  She know Tamela is very stressed due to family dynamics with her son and his wife.  She also had loss of parents, sister, and now son has leukemia.  Mrs Villalba said people said she/he was lying about this but she did find out herself this was true and has been going to appts with him as well.      I let them know if we can be of any assistance to let us know and if records are needed at facility they can do a medical release form and we can get records

## 2025-05-14 RX ORDER — QUETIAPINE FUMARATE 25 MG/1
25 TABLET, FILM COATED ORAL NIGHTLY
Qty: 90 TABLET | Refills: 1 | Status: SHIPPED | OUTPATIENT
Start: 2025-05-14 | End: 2026-05-14

## 2025-05-14 NOTE — TELEPHONE ENCOUNTER
"Called     Patient stated she almost "killed herself" the past week.  Geraldine her sister has worked on getting a meeting between Geraldine, Richie, and Pastor Fisher to see if they can help resolve some family conflict.  This has given her some hope.  She said it was hard especially for mother's day and Aliya's graduation missing all these things.      She stated the son with Leukemia is gone back to [place] and he has been in MICU but is out.  She stated when he was here he left some of his [employer] med Seroquel.   Patient has taken some Seroquel from her other son supply it was only like 3 pills and it did help her rest.      She said she is unable to come out of the office but feels like she needs some meds          "

## 2025-05-14 NOTE — TELEPHONE ENCOUNTER
Copied from CRM #[id number]. Topic: General Inquiry - Patient Advice  >> May 14, 2025  8:41 AM Regi wrote:  Who Called: Tammy Behles    Patient wants to speak with a nurse concerning her struggling mentally and needing some help and maybe some medication can be offered. She really just wants to talk to a nurse    Preferred Method of Contact: Phone Call  Patient's Preferred Phone Number on File: [phone]   Best Call Back Number, if different:  Additional Information:

## 2025-05-19 NOTE — TELEPHONE ENCOUNTER
Copied from CRM #[id number]. Topic: General Inquiry - Test Results  >> May 19, 2025  8:38 AM Med Assistant Emily wrote:  Who Called: Tammy Behles    Caller is requesting information on test results.    Name of Test (Lab/Mammo/Etc): blood work      Preferred Method of Contact: Phone Call  Patient's Preferred Phone Number on File: [phone]   Best Call Back Number, if different:  Additional Information: test results

## 2025-05-20 NOTE — TELEPHONE ENCOUNTER
According to Claire her Neurology appt  is scheduled for 01/12/2026 @ 1:00 at [facility].     I called the office today and still no cancellations for earlier appt [phone]     They recommended you calling the line for providers only to call for earlier appts if you feel she needs sooner appt [phone]

## 2025-05-21 RX ORDER — QUETIAPINE FUMARATE 50 MG/1
TABLET, FILM COATED ORAL
Qty: 270 TABLET | Refills: 1 | Status: SHIPPED | OUTPATIENT
Start: 2025-05-21

## 2025-05-21 NOTE — TELEPHONE ENCOUNTER
Its because its external and we can't see them and they dont always respond sometimes its a week or more and we have to keep calling them. I'm fine with increasing seroquel  at night and maybe taking smaller dose in morning may help as well.

## 2025-05-21 NOTE — TELEPHONE ENCOUNTER
I can send over the new RX to the pharmacy   How do you want to dose the seroquel     Her mood did sound better over the phone also    I sent you a number yesterday for providers only to try to get her neuro appt moved up from Jan 2026- [phone]

## 2025-05-21 NOTE — TELEPHONE ENCOUNTER
Seroquel she is having to do 2-3 at night to help her sleep at night and wanted to know if she can help her sleep     She is feeling much better doing the higher dose of the Seroquel and wants to increase the dose if possible     Also she was trying to f/u on neurology appt and her endocrine appt     Informed her on neurology and vickie did not know anything on endocrine and will f/u on that today     Pt states she feels like she keeps falling through the cracks with her stuff

## 2025-05-22 ENCOUNTER — PATIENT MESSAGE (OUTPATIENT)
Dept: FAMILY MEDICINE | Facility: CLINIC | Age: 65
End: 2025-05-22
Payer: MEDICARE

## 2025-06-02 ENCOUNTER — TELEPHONE (OUTPATIENT)
Dept: FAMILY MEDICINE | Facility: CLINIC | Age: 65
End: 2025-06-02
Payer: MEDICARE

## 2025-07-14 DIAGNOSIS — R90.82 WHITE MATTER ABNORMALITY ON MRI OF BRAIN: ICD-10-CM

## 2025-07-14 DIAGNOSIS — G62.9 NEUROPATHY: ICD-10-CM

## 2025-07-14 NOTE — TELEPHONE ENCOUNTER
Copied from CRM #[id number]. Topic: Medications - Medication Refill  >> Jul 14, 2025  9:14 AM Leon Martino wrote:   Pt needs refills on pregabalin (LYRICA) 75 MG capsule,  QUEtiapine (SEROQUEL) 50 MG tablet    [facility] [address]  [address] [address]  Phone: [phone] Fax: [phone]    Vanessa Rasmussen, YEYO  PCP - General, Family      Call back number [phone]

## 2025-07-16 RX ORDER — PREGABALIN 75 MG/1
75 CAPSULE ORAL NIGHTLY
Qty: 30 CAPSULE | Refills: 2 | Status: SHIPPED | OUTPATIENT
Start: 2025-07-16 | End: 2026-01-14

## 2025-07-16 RX ORDER — QUETIAPINE FUMARATE 50 MG/1
TABLET, FILM COATED ORAL
Qty: 270 TABLET | Refills: 1 | Status: SHIPPED | OUTPATIENT
Start: 2025-07-16

## 2025-07-31 ENCOUNTER — PATIENT MESSAGE (OUTPATIENT)
Dept: FAMILY MEDICINE | Facility: CLINIC | Age: 65
End: 2025-07-31
Payer: MEDICARE

## 2025-08-25 ENCOUNTER — PATIENT MESSAGE (OUTPATIENT)
Facility: HOSPITAL | Age: 65
End: 2025-08-25
Payer: MEDICARE